# Patient Record
Sex: MALE | Race: WHITE | Employment: UNEMPLOYED | URBAN - METROPOLITAN AREA
[De-identification: names, ages, dates, MRNs, and addresses within clinical notes are randomized per-mention and may not be internally consistent; named-entity substitution may affect disease eponyms.]

---

## 2020-01-02 ENCOUNTER — HOSPITAL ENCOUNTER (INPATIENT)
Age: 25
LOS: 4 days | Discharge: HOME | DRG: 885 | End: 2020-01-06
Attending: PSYCHIATRY & NEUROLOGY | Admitting: PSYCHIATRY & NEUROLOGY
Payer: COMMERCIAL

## 2020-01-02 PROBLEM — F29 PSYCHOSIS (HCC): Status: ACTIVE | Noted: 2020-01-02

## 2020-01-02 LAB
ACETAMINOPHEN LEVEL: <5 UG/ML (ref 10–30)
ALBUMIN SERPL-MCNC: 4.8 G/DL (ref 3.5–4.6)
ALP BLD-CCNC: 90 U/L (ref 35–104)
ALT SERPL-CCNC: 11 U/L (ref 0–41)
AMPHETAMINE SCREEN, URINE: ABNORMAL
ANION GAP SERPL CALCULATED.3IONS-SCNC: 14 MEQ/L (ref 9–15)
AST SERPL-CCNC: 11 U/L (ref 0–40)
BARBITURATE SCREEN URINE: ABNORMAL
BASOPHILS ABSOLUTE: 0.1 K/UL (ref 0–0.2)
BASOPHILS RELATIVE PERCENT: 0.8 %
BENZODIAZEPINE SCREEN, URINE: ABNORMAL
BILIRUB SERPL-MCNC: 1.5 MG/DL (ref 0.2–0.7)
BILIRUBIN URINE: NEGATIVE
BLOOD, URINE: NEGATIVE
BUN BLDV-MCNC: 14 MG/DL (ref 6–20)
CALCIUM SERPL-MCNC: 10.1 MG/DL (ref 8.5–9.9)
CANNABINOID SCREEN URINE: POSITIVE
CHLORIDE BLD-SCNC: 99 MEQ/L (ref 95–107)
CLARITY: ABNORMAL
CO2: 24 MEQ/L (ref 20–31)
COCAINE METABOLITE SCREEN URINE: ABNORMAL
COLOR: YELLOW
CREAT SERPL-MCNC: 0.71 MG/DL (ref 0.7–1.2)
EOSINOPHILS ABSOLUTE: 0.1 K/UL (ref 0–0.7)
EOSINOPHILS RELATIVE PERCENT: 1.2 %
ETHANOL PERCENT: NORMAL G/DL
ETHANOL: <10 MG/DL (ref 0–0.08)
GFR AFRICAN AMERICAN: >60
GFR NON-AFRICAN AMERICAN: >60
GLOBULIN: 3.1 G/DL (ref 2.3–3.5)
GLUCOSE BLD-MCNC: 99 MG/DL (ref 70–99)
GLUCOSE URINE: NEGATIVE MG/DL
HCT VFR BLD CALC: 45.7 % (ref 42–52)
HEMOGLOBIN: 15.6 G/DL (ref 14–18)
KETONES, URINE: NEGATIVE MG/DL
LEUKOCYTE ESTERASE, URINE: NEGATIVE
LYMPHOCYTES ABSOLUTE: 1 K/UL (ref 1–4.8)
LYMPHOCYTES RELATIVE PERCENT: 10.8 %
Lab: ABNORMAL
MCH RBC QN AUTO: 32.4 PG (ref 27–31.3)
MCHC RBC AUTO-ENTMCNC: 34.1 % (ref 33–37)
MCV RBC AUTO: 95.1 FL (ref 80–100)
METHADONE SCREEN, URINE: ABNORMAL
MONOCYTES ABSOLUTE: 0.7 K/UL (ref 0.2–0.8)
MONOCYTES RELATIVE PERCENT: 7.8 %
NEUTROPHILS ABSOLUTE: 7.3 K/UL (ref 1.4–6.5)
NEUTROPHILS RELATIVE PERCENT: 79.4 %
NITRITE, URINE: NEGATIVE
OPIATE SCREEN URINE: ABNORMAL
OXYCODONE URINE: ABNORMAL
PDW BLD-RTO: 13.3 % (ref 11.5–14.5)
PH UA: 7 (ref 5–9)
PHENCYCLIDINE SCREEN URINE: ABNORMAL
PLATELET # BLD: 220 K/UL (ref 130–400)
POTASSIUM SERPL-SCNC: 4 MEQ/L (ref 3.4–4.9)
PROPOXYPHENE SCREEN: ABNORMAL
PROTEIN UA: NEGATIVE MG/DL
RBC # BLD: 4.8 M/UL (ref 4.7–6.1)
SALICYLATE, SERUM: <0.3 MG/DL (ref 15–30)
SODIUM BLD-SCNC: 137 MEQ/L (ref 135–144)
SPECIFIC GRAVITY UA: 1.01 (ref 1–1.03)
TOTAL CK: 39 U/L (ref 0–190)
TOTAL PROTEIN: 7.9 G/DL (ref 6.3–8)
TSH SERPL DL<=0.05 MIU/L-ACNC: 1.64 UIU/ML (ref 0.44–3.86)
URINE REFLEX TO CULTURE: ABNORMAL
UROBILINOGEN, URINE: 1 E.U./DL
WBC # BLD: 9.2 K/UL (ref 4.8–10.8)

## 2020-01-02 PROCEDURE — 1240000000 HC EMOTIONAL WELLNESS R&B

## 2020-01-02 PROCEDURE — 84443 ASSAY THYROID STIM HORMONE: CPT

## 2020-01-02 PROCEDURE — 80053 COMPREHEN METABOLIC PANEL: CPT

## 2020-01-02 PROCEDURE — 36415 COLL VENOUS BLD VENIPUNCTURE: CPT

## 2020-01-02 PROCEDURE — G0480 DRUG TEST DEF 1-7 CLASSES: HCPCS

## 2020-01-02 PROCEDURE — 96374 THER/PROPH/DIAG INJ IV PUSH: CPT

## 2020-01-02 PROCEDURE — 81003 URINALYSIS AUTO W/O SCOPE: CPT

## 2020-01-02 PROCEDURE — 80307 DRUG TEST PRSMV CHEM ANLYZR: CPT

## 2020-01-02 PROCEDURE — 99285 EMERGENCY DEPT VISIT HI MDM: CPT

## 2020-01-02 PROCEDURE — 6370000000 HC RX 637 (ALT 250 FOR IP): Performed by: PHYSICIAN ASSISTANT

## 2020-01-02 PROCEDURE — 82550 ASSAY OF CK (CPK): CPT

## 2020-01-02 PROCEDURE — 6360000002 HC RX W HCPCS: Performed by: EMERGENCY MEDICINE

## 2020-01-02 PROCEDURE — 85025 COMPLETE CBC W/AUTO DIFF WBC: CPT

## 2020-01-02 RX ORDER — HYDROXYZINE HYDROCHLORIDE 50 MG/ML
50 INJECTION, SOLUTION INTRAMUSCULAR EVERY 6 HOURS PRN
Status: DISCONTINUED | OUTPATIENT
Start: 2020-01-02 | End: 2020-01-06 | Stop reason: HOSPADM

## 2020-01-02 RX ORDER — TRAZODONE HYDROCHLORIDE 50 MG/1
50 TABLET ORAL NIGHTLY PRN
Status: DISCONTINUED | OUTPATIENT
Start: 2020-01-02 | End: 2020-01-06 | Stop reason: HOSPADM

## 2020-01-02 RX ORDER — LORAZEPAM 1 MG/1
2 TABLET ORAL ONCE
Status: COMPLETED | OUTPATIENT
Start: 2020-01-02 | End: 2020-01-02

## 2020-01-02 RX ORDER — MAGNESIUM HYDROXIDE/ALUMINUM HYDROXICE/SIMETHICONE 120; 1200; 1200 MG/30ML; MG/30ML; MG/30ML
30 SUSPENSION ORAL EVERY 6 HOURS PRN
Status: DISCONTINUED | OUTPATIENT
Start: 2020-01-02 | End: 2020-01-06 | Stop reason: HOSPADM

## 2020-01-02 RX ORDER — HYDROXYZINE PAMOATE 50 MG/1
50 CAPSULE ORAL EVERY 6 HOURS PRN
Status: DISCONTINUED | OUTPATIENT
Start: 2020-01-02 | End: 2020-01-06 | Stop reason: HOSPADM

## 2020-01-02 RX ORDER — HALOPERIDOL 5 MG/ML
5 INJECTION INTRAMUSCULAR ONCE
Status: COMPLETED | OUTPATIENT
Start: 2020-01-02 | End: 2020-01-02

## 2020-01-02 RX ORDER — ACETAMINOPHEN 325 MG/1
650 TABLET ORAL EVERY 4 HOURS PRN
Status: DISCONTINUED | OUTPATIENT
Start: 2020-01-02 | End: 2020-01-06 | Stop reason: HOSPADM

## 2020-01-02 RX ORDER — BENZTROPINE MESYLATE 1 MG/ML
2 INJECTION INTRAMUSCULAR; INTRAVENOUS 2 TIMES DAILY PRN
Status: DISCONTINUED | OUTPATIENT
Start: 2020-01-02 | End: 2020-01-06 | Stop reason: HOSPADM

## 2020-01-02 RX ORDER — HALOPERIDOL 5 MG/ML
5 INJECTION INTRAMUSCULAR EVERY 6 HOURS PRN
Status: DISCONTINUED | OUTPATIENT
Start: 2020-01-02 | End: 2020-01-06 | Stop reason: HOSPADM

## 2020-01-02 RX ORDER — HALOPERIDOL 5 MG
5 TABLET ORAL EVERY 6 HOURS PRN
Status: DISCONTINUED | OUTPATIENT
Start: 2020-01-02 | End: 2020-01-06 | Stop reason: HOSPADM

## 2020-01-02 RX ADMIN — HALOPERIDOL LACTATE 5 MG: 5 INJECTION INTRAMUSCULAR at 16:08

## 2020-01-02 RX ADMIN — LORAZEPAM 2 MG: 1 TABLET ORAL at 13:34

## 2020-01-02 ASSESSMENT — ENCOUNTER SYMPTOMS
ABDOMINAL DISTENTION: 0
ABDOMINAL PAIN: 0
CONSTIPATION: 0
SORE THROAT: 0
RHINORRHEA: 0
COLOR CHANGE: 0
EYE DISCHARGE: 0
SHORTNESS OF BREATH: 0

## 2020-01-02 NOTE — ED NOTES
Provisional Diagnosis:    Psychosis, NOS    Psychosocial and Contextual Factors:    Pt lives alone. Pt reports he stays with his parents often. Pt has reliable transportation. Pt is single. Pt doesn't work. C-SSRS Summary:     Patient: C-SSRS Suicide Screening  1) Within the past month, have you wished you were dead or wished you could go to sleep and not wake up? : Yes  2) Have you actually had any thoughts of killing yourself? : No  6) Have you ever done anything, started to do anything, or prepared to do anything to end your life?: No    Family: None at bedside    Agency: Pt denies, pt sheeted by [address] [employer] [address] [facility] Summary:    [facility] Pt arrived to ED by EMS. Pt reports they arrived at the ED due to [facility] thinks I am hallucinating. I am only trying to numb my pain with marijuana. \" Pt reports 1-2 hrs of sleep at night. Pt reports poor sleep at night d/t trouble falling asleep. Pt c/o racing thoughts. Pt reports \"I am afraid to fall asleep because a meteor might hit. \" Pt reports appetite and nutrition is decreased. Pt rates depressed 1-10/10. Pt rates anxiety 1-10/10. Pt reports his anxiety and depression change depending on his situation. Pt A&O x4. Pt voices delusions of \"my parents always talk about me behind my back. \" and \"A meteor is on it's way but I try and look at the ground. \" and \"I was stuck in purgatory once. \"  Pt reports, \"I was admitted to the psychiatric unit once. I was living in the woods finding out who I am. My father never told me about the birds and the bees. He wouldn't let me read the notebooks and now I don't know the secrets. \" Pt denies AVH. Pt talks to self. Pt presents with poor judgment and poor insight. Pt reports passive SI, but denies plan or intent. Pt contracts for safety while in [facility]. Pt denies past suicide attempts. Pt denies past and current self injury. Pt denies HI. Pt denies history of violence toward others.  Pt denies current parole or

## 2020-01-02 NOTE — ED NOTES
Pt awake and talking to self. Pt c/o increased anxiety. Provider aware. Pt accepted PRN medication, mouth check completed.       Portia Lozano RN  01/02/20 7254

## 2020-01-02 NOTE — ED NOTES
Pt in bed with eyes closed. Respirations are even and unlabored. No s/s of distress noted at this time. Will continue to monitor.         Morgan Turcios RN  01/02/20 0831

## 2020-01-02 NOTE — ED NOTES
Pt is quiet and asleep in bed with even respirations he was awakened for his dinner at bedside     Diya Jacobs RN  01/02/20 0079

## 2020-01-02 NOTE — ED PROVIDER NOTES
for difficulty urinating and dysuria. Musculoskeletal: Negative for arthralgias. Skin: Negative for color change. Neurological: Negative for dizziness, syncope, numbness and headaches. Psychiatric/Behavioral: Positive for confusion and hallucinations. Negative for agitation, self-injury and suicidal ideas. The patient is not nervous/anxious. Except as noted above the remainder of the review of systems was reviewed and negative. PAST MEDICAL HISTORY   No past medical history on file. SURGICALHISTORY     No past surgical history on file. CURRENT MEDICATIONS       Previous Medications    No medications on file       ALLERGIES     Patient has no known allergies. FAMILY HISTORY     No family history on file.        SOCIAL HISTORY       Social History     Socioeconomic History    Marital status: Single     Spouse name: Not on file    Number of children: Not on file    Years of education: Not on file    Highest education level: Not on file   Occupational History    Not on file   Social Needs    Financial resource strain: Not on file    Food insecurity:     Worry: Not on file     Inability: Not on file    Transportation needs:     Medical: Not on file     Non-medical: Not on file   Tobacco Use    Smoking status: Not on file   Substance and Sexual Activity    Alcohol use: Not on file    Drug use: Not on file    Sexual activity: Not on file   Lifestyle    Physical activity:     Days per week: Not on file     Minutes per session: Not on file    Stress: Not on file   Relationships    Social connections:     Talks on phone: Not on file     Gets together: Not on file     Attends Confucianist service: Not on file     Active member of club or organization: Not on file     Attends meetings of clubs or organizations: Not on file     Relationship status: Not on file    Intimate partner violence:     Fear of current or ex partner: Not on file     Emotionally abused: Not on file Physically abused: Not on file     Forced sexual activity: Not on file   Other Topics Concern    Not on file   Social History Narrative    Not on file       SCREENINGS      @TVAD([id number])@      PHYSICAL EXAM    (up to 7 for level 4, 8 or more for level 5)     ED Triage Vitals [01/02/20 1216]   BP Temp Temp Source Pulse Resp SpO2 Height Weight   138/77 98.4 °F (36.9 °C) Oral 74 20 100 % -- --       Physical Exam  Constitutional:       Appearance: He is well-developed. HENT:      Head: Normocephalic. Right Ear: Tympanic membrane normal.      Left Ear: Tympanic membrane normal.      Mouth/Throat:      Mouth: Mucous membranes are moist.   Eyes:      Extraocular Movements: Extraocular movements intact. Pupils: Pupils are equal, round, and reactive to light. Neck:      Musculoskeletal: Neck supple. Vascular: No JVD. Trachea: No tracheal deviation. Cardiovascular:      Rate and Rhythm: Normal rate. Pulmonary:      Effort: Pulmonary effort is normal. No respiratory distress. Breath sounds: No wheezing or rales. Chest:      Chest wall: No tenderness. Abdominal:      General: There is no distension. Palpations: There is no mass. Tenderness: There is no tenderness. There is no right CVA tenderness, left CVA tenderness, guarding or rebound. Musculoskeletal: Normal range of motion. General: No deformity. Neurological:      Coordination: Coordination normal.      Comments: Patient is alert, he seems to be oriented, he is mildly confused, he knows that he is at the hospital, he knows it is 2020, he knows that the most recent holiday was New Year's Day. He is fixated on past issues as a child, he states that he at one time became lost in the woods, he states that he was once  from his Boy  trip, he states also he was once struck in the chest by a baseball, is fixated on these past issues.   Moving all extremities well, he does not appear in any acute distress. Denies any suicidal homicidal thoughts. DIAGNOSTIC RESULTS     EKG: All EKG's are interpreted by the Emergency Department Physician who either signs or Co-signsthis chart in the absence of a cardiologist.        RADIOLOGY:   Tory Breach such as CT, Ultrasound and MRI are read by the radiologist. Plain radiographic images are visualized and preliminarily interpreted by the emergency physician with the below findings:        Interpretation per the Radiologist below, if available at the time ofthis note:    No orders to display         ED BEDSIDE ULTRASOUND:   Performed by ED Physician - none    LABS:  Labs Reviewed   ACETAMINOPHEN LEVEL - Abnormal; Notable for the following components:       Result Value    Acetaminophen Level <5 (*)     All other components within normal limits   CBC WITH AUTO DIFFERENTIAL - Abnormal; Notable for the following components:    MCH 32.4 (*)     Neutrophils Absolute 7.3 (*)     All other components within normal limits   COMPREHENSIVE METABOLIC PANEL - Abnormal; Notable for the following components:    Calcium 10.1 (*)     Alb 4.8 (*)     Total Bilirubin 1.5 (*)     All other components within normal limits   SALICYLATE LEVEL - Abnormal; Notable for the following components:    Salicylate, Serum <7.1 (*)     All other components within normal limits   URINE DRUG SCREEN - Abnormal; Notable for the following components:    Cannabinoid Scrn, Ur POSITIVE (*)     All other components within normal limits   URINE RT REFLEX TO CULTURE - Abnormal; Notable for the following components:    Clarity, UA TURBID (*)     All other components within normal limits   CK   ETHANOL   TSH WITHOUT REFLEX       All other labs were within normal range or not returned as of this dictation.     EMERGENCY DEPARTMENT COURSE and DIFFERENTIAL DIAGNOSIS/MDM:   Vitals:    Vitals:    01/02/20 1216   BP: 138/77   Pulse: 74   Resp: 20   Temp: 98.4 °F (36.9 °C)   TempSrc: Oral   SpO2: 100% signed)  Attending Emergency Physician         Oleksandr Link PA-C  01/02/20 [facility] Donaldo Elizabeth PA-C  01/02/20 8818

## 2020-01-02 NOTE — ED TRIAGE NOTES
Pt reports he was \"sitting in my room. \" Pt reports \"my mom called to get me help. \" Pt passive SI. Pt denies HI. Pt denies AVH. Pt reports \"I have delusions. I smoke weed everyday. \" Pt reports he uses CBD oils.

## 2020-01-02 NOTE — ED NOTES
Called [facility] for bed assignment and report. Pt reports passive SI. Pt verbally contracts for safety while in [facility]. Pt reports they will notify staff with changes. Aris Davis reports pt is medically clear.    Shashi Ramos RN  01/02/20 0373

## 2020-01-02 NOTE — ED NOTES
Per Dr. Dunham Pi, admit to [facility] with psychosis NOS with standing orders.  Electronically signed by Laura Jasmine RN on 1/2/2020 at [address] [place] [address], RN  01/02/20 4327

## 2020-01-03 LAB
CHOLESTEROL, TOTAL: 194 MG/DL (ref 0–199)
EKG ATRIAL RATE: 67 BPM
EKG P AXIS: 16 DEGREES
EKG P-R INTERVAL: 166 MS
EKG Q-T INTERVAL: 394 MS
EKG QRS DURATION: 92 MS
EKG QTC CALCULATION (BAZETT): 416 MS
EKG R AXIS: 68 DEGREES
EKG T AXIS: 38 DEGREES
EKG VENTRICULAR RATE: 67 BPM
HDLC SERPL-MCNC: 43 MG/DL (ref 40–59)
LDL CHOLESTEROL CALCULATED: 139 MG/DL (ref 0–129)
TRIGL SERPL-MCNC: 61 MG/DL (ref 0–150)

## 2020-01-03 PROCEDURE — 93005 ELECTROCARDIOGRAM TRACING: CPT | Performed by: PSYCHIATRY & NEUROLOGY

## 2020-01-03 PROCEDURE — 36415 COLL VENOUS BLD VENIPUNCTURE: CPT

## 2020-01-03 PROCEDURE — 6370000000 HC RX 637 (ALT 250 FOR IP): Performed by: PSYCHIATRY & NEUROLOGY

## 2020-01-03 PROCEDURE — 1240000000 HC EMOTIONAL WELLNESS R&B

## 2020-01-03 PROCEDURE — 99223 1ST HOSP IP/OBS HIGH 75: CPT | Performed by: PSYCHIATRY & NEUROLOGY

## 2020-01-03 PROCEDURE — 80061 LIPID PANEL: CPT

## 2020-01-03 RX ORDER — RISPERIDONE 0.5 MG/1
0.5 TABLET, FILM COATED ORAL 2 TIMES DAILY
Status: DISCONTINUED | OUTPATIENT
Start: 2020-01-03 | End: 2020-01-04

## 2020-01-03 RX ADMIN — TRAZODONE HYDROCHLORIDE 50 MG: 50 TABLET ORAL at 21:24

## 2020-01-03 RX ADMIN — RISPERIDONE 0.5 MG: 0.5 TABLET ORAL at 12:02

## 2020-01-03 RX ADMIN — RISPERIDONE 0.5 MG: 0.5 TABLET ORAL at 21:24

## 2020-01-03 ASSESSMENT — SLEEP AND FATIGUE QUESTIONNAIRES
DIFFICULTY STAYING ASLEEP: YES
RESTFUL SLEEP: YES
DO YOU HAVE DIFFICULTY SLEEPING: YES
SLEEP PATTERN: DISTURBED/INTERRUPTED SLEEP;DIFFICULTY FALLING ASLEEP
DO YOU USE A SLEEP AID: NO
DIFFICULTY FALLING ASLEEP: YES
AVERAGE NUMBER OF SLEEP HOURS: 5
DIFFICULTY ARISING: NO

## 2020-01-03 ASSESSMENT — LIFESTYLE VARIABLES: HISTORY_ALCOHOL_USE: NO

## 2020-01-03 ASSESSMENT — PATIENT HEALTH QUESTIONNAIRE - PHQ9: SUM OF ALL RESPONSES TO PHQ QUESTIONS 1-9: 2

## 2020-01-03 NOTE — PROGRESS NOTES
Pt out on unit and social with peers, short time period. During conversation voiced not sleeping for days, lying in bed at this time. Encouraged to get out of bed, will be able to sleep to night if out of bed during his shift. Voiced impaired concentration, loss of memory, at times, patient encouraged to write things down, keep a written log. Pt reports showering today. Pt reports good appetit  Pt reports poor sleep, due to trouble falling asleep and staying asleep. Pt states, anxiety  Pt rates anxiety ,5/10. Pt rates depression,5 / 10. Pt reports attending groups  Pt denies SI, HI and A/V hallucinations. No voiced delusions at this time. Pt alert and oriented x 4. Pt calm and cooperative  Will continue to monitor.

## 2020-01-03 NOTE — PROGRESS NOTES
Patient did not attend 2100 group despite staff encouragement.  Electronically signed by Kimmy Jaramillo on 1/2/2020 at 9:52 PM

## 2020-01-03 NOTE — H&P
Department of Psychiatry  History and Physical - Adult     CHIEF COMPLAINT:  Psychosis    History obtained from:  patient    Patient was seen after discussing with the treatment team and reviewing the chart    HISTORY OF PRESENT ILLNESS:    Pt is pink sheeted by [facility]. Pt arrived to ED by EMS. Pt reports they arrived at the ED due to [facility] thinks I am hallucinating. I am only trying to numb my pain with marijuana. \" Pt reports 1-2 hrs of sleep at night. Pt reports poor sleep at night d/t trouble falling asleep. Pt c/o racing thoughts. Pt reports \"I am afraid to fall asleep because a meteor might hit. \" Pt reports appetite and nutrition is decreased. Pt rates depressed 1-10/10. Pt rates anxiety 1-10/10. Pt reports his anxiety and depression change depending on his situation. Pt A&O x4. Pt voices delusions of \"my parents always talk about me behind my back. \" and \"A meteor is on it's way but I try and look at the ground. \" and \"I was stuck in purgatory once. \"  Pt reports, \"I was admitted to the psychiatric unit once. I was living in the woods finding out who I am. My father never told me about the birds and the bees. He wouldn't let me read the notebooks and now I don't know the secrets. \" Pt denies AVH. Pt talks to self. Pt presents with poor judgment and poor insight. Pt reports passive SI, but denies plan or intent. Pt contracts for safety while in [facility]. Pt denies past suicide attempts. Pt denies past and current self injury. Pt denies HI. Pt denies history of violence toward others. Pt denies current parole or probation. Pt uses THC and CBD oils. Pt denies history of physical, sexual, emotional, verbal, and financial abuse. Pt is not currently following up with mental health services or medication prescribing provider. Pt reports no support system. Per cely pt had an inpatient psychiatric stay for increased delusions after using CBD.     During interview, pt report that he live with his parents   Pt was recently admitted to [facility] facility recently under similar circumstances  Above delusional symptoms confirmed during the interview  Pt got brought to hospital pink slipped  Pt is evasive and guarded  Pt believe that the world is going to end - hit by meteorite  Feel hopeless that no one is able to save the planet  Paranoid that people are talking behind his back  Pt believe that life is a game and he can restart    Stressors:cannot perform music in front of crowd    The patient is not currently receiving care for the above psychiatric illness. Medications Prior to Admission:   No medications prior to admission. Compliance:no    Psychiatric Review of Systems       Depression: yes     Tuyet or Hypomania:  no     Panic Attacks:  no     Phobias:  no     Obsessions and Compulsions:  no     PTSD : no     Hallucinations:  yes      Delusions:  yes     Substance Abuse History:  ETOH: no   Marijuana: yes - CBD oil and THC  Opiates: no  Other Drugs: no      Past Psychiatric History:  Prior Diagnosis:  Psychosis  Psychiatrist: no  Therapist:no  Hospitalization: yes  Hx of Suicidal Attempts: no  Hx of violence:  no  ECT: no  Previous discontinued Psychiatric Med Trials:     Past Medical History:    History reviewed. No pertinent past medical history. Past Surgical History:    No past surgical history on file. Allergies:   Patient has no known allergies. Family History  Family History   Family history unknown: Yes         Social History:  Born and Raised: [place]  Describes Childhood:   supportive  Education: [employer], in college  Employment: Unemployed, not seeking work  Relationships: single  Children: no children  Current Support: parents    Legal Hx: none  Access to weapons?:  No      EXAMINATION:    REVIEW OF SYSTEMS:    ROS:  [x] All negative/unchanged except if checked.  Explain positive(checked items) below:  [] Constitutional  [] Eyes  [] Ear/Nose/Mouth/Throat  [] Respiratory  [] CV  [] GI  []   [] Musculoskeletal  [] Skin/Breast  [] Neurological  [] Endocrine  [] Heme/Lymph  [] Allergic/Immunologic    Explanation:     Vitals:  BP (!) 143/87 Comment: RN notified  Pulse 81   Temp 98 °F (36.7 °C) (Oral)   Resp 18   SpO2 99%      Neurologic Exam:   Muscle Strength & Tone: full ROM  Gait: normal gait   Involuntary Movements: No    Mental Status Examination:    Level of consciousness:  within normal limits   Appearance:  ill-appearing  Behavior/Motor:  responding to internal stimuli  Attitude toward examiner:  cooperative  Speech:  slow   Mood: decreased range, depressed and dysthymic  Affect:  mood congruent  Thought processes:  slow   Thought content:  Suicidal Ideation:  passive  Delusions:  no evidence of delusions  Perceptual Disturbance:  denies any perceptual disturbance  Cognition:  oriented to person, place, and time   Concentration intact  Memory intact  Insight poor   Judgement poor   Fund of Knowledge limited    Mini Mental Status 30/30      DIAGNOSIS:     Substance-induced (MJ) psychotic disorder   R/O bipolar disorder        RISK ASSESSMENT:    SUICIDE RISK ASSESSMENT: Moderate  HOMICIDE: low  AGITATION/VIOLENCE: moderate  ELOPEMENT: high    LABS: REVIEWED TODAY:  Recent Labs     01/02/20  1255   WBC 9.2   HGB 15.6        Recent Labs     01/02/20  1255      K 4.0   CL 99   CO2 24   BUN 14   CREATININE 0.71   GLUCOSE 99     Recent Labs     01/02/20  1255   BILITOT 1.5*   ALKPHOS 90   AST 11   ALT 11     Lab Results   Component Value Date    LABAMPH Neg 01/02/2020    BARBSCNU Neg 01/02/2020    LABBENZ Neg 01/02/2020    LABMETH Neg 01/02/2020    OPIATESCREENURINE Neg 01/02/2020    PHENCYCLIDINESCREENURINE Neg 01/02/2020    ETOH <10 01/02/2020     Lab Results   Component Value Date    TSH 1.640 01/02/2020     No results found for: LITHIUM  No results found for: VALPROATE, CBMZ  No results found for: LITHIUM, VALPROATE    FURTHER LABS ORDERED :      Radiology   No results found.     EKG:

## 2020-01-03 NOTE — PROGRESS NOTES
Nutrition Assessment    Type and Reason for Visit: Positive Nutrition Screen, Initial    Nutrition Recommendations:   Continue with General diet  Request weight and height for further assessment    Nutrition Assessment:     Nutrition Referral received for decreased oral intake & / or reported weight loss . Pt currently admitted on behavioral health floor. Anticipate improvement in oral intake with inpatient psychiatric treatment and/or  abstinence from substance abuse. General diet appropriate at this time. Malnutrition Assessment:  · Malnutrition Status: Insufficient data    Nutrition Risk Level   Risk Level:  Moderate    Nutrition Diagnosis:   · Problem: Inadequate oral intake  · Etiology: Psychological cause/life stress    Signs and symptoms: Patient report of    Nutrition Intervention:  Food and/or Delivery: Continue current diet  Nutrition Education/Counseling/Coordination of Care:  Continued Inpatient Monitoring      Electronically signed by Dayo Valencia RD, LD on 1/3/20 at 2:53 PM

## 2020-01-03 NOTE — CARE COORDINATION
Brief Intervention and Referral to Treatment Summary    Patient was provided PHQ-9, AUDIT and DAST Screening:      PHQ-9 Score:   AUDIT Score:  0  DAST Score:  0    Patients substance use is considered     Low Risk/Healthy x  Moderate Risk  Harmful  Dependent    Patients depression is considered:     Minimal x  Mild   Moderate  Moderately Severe  Severe    Brief Education Was Provided    Patient was receptive to discussing treatment options      Brief Intervention Is Provided (Only for AUDIT or DAST)     Patient denies readiness to decrease and/or stop use and a plan was not discussed      Recommendations/Referrals for Brief and/or Specialized Treatment Provided to Patient     medication management, group/individual therapies, family meetings, psycho -education, treatment team meetings to assist with stabilization

## 2020-01-03 NOTE — H&P
[address] MEDICINE    HISTORY AND PHYSICAL EXAM    PATIENT NAME:  Matthew Hawkins    MRN:  [id number]  SERVICE DATE:  1/3/2020   SERVICE TIME:  11:37 AM    Primary Care Physician: Luz Marina Luna MD         SUBJECTIVE  CHIEF COMPLAINT:  Medical clear for inpatient psychiatry admission. Consult for medical H/P encounter. HPI:  This is a 25 y.o. male who presented to emergency room with increased psychosis, delusions and paranoia. Patient was pink slipped through [facility]. Patient admits to the chronic use of  synthetic marijuana. Patient cleared from emergency room for psychiatric care. Patient Seen, Chart, Labs, Radiologystudies, and Consults reviewed. Patient denies headache, chest pain, shortness of breath, N/V/D/C, fever/chills. PAST MEDICAL HISTORY:  History reviewed. No pertinent past medical history. PAST SURGICAL HISTORY:  No past surgical history on file.   FAMILY HISTORY:    Family History   Family history unknown: Yes     SOCIAL HISTORY:    Social History     Socioeconomic History    Marital status: Single     Spouse name: Not on file    Number of children: Not on file    Years of education: Not on file    Highest education level: Not on file   Occupational History    Not on file   Social Needs    Financial resource strain: Not on file    Food insecurity:     Worry: Not on file     Inability: Not on file    Transportation needs:     Medical: Not on file     Non-medical: Not on file   Tobacco Use    Smoking status: Not on file   Substance and Sexual Activity    Alcohol use: Not on file    Drug use: Not on file    Sexual activity: Not on file   Lifestyle    Physical activity:     Days per week: Not on file     Minutes per session: Not on file    Stress: Not on file   Relationships    Social connections:     Talks on phone: Not on file     Gets together: Not on file     Attends Caodaism service: Not on file     Active member of club or organization: Not on file Attends meetings of clubs or organizations: Not on file     Relationship status: Not on file    Intimate partner violence:     Fear of current or ex partner: Not on file     Emotionally abused: Not on file     Physically abused: Not on file     Forced sexual activity: Not on file   Other Topics Concern    Not on file   Social History Narrative    Not on file     MEDICATIONS:    Current Facility-Administered Medications   Medication Dose Route Frequency Provider Last Rate Last Dose    risperiDONE (RISPERDAL) tablet 0.5 mg  0.5 mg Oral BID Melania Ely MD        acetaminophen (TYLENOL) tablet 650 mg  650 mg Oral Q4H PRN Melania Ely MD        magnesium hydroxide (MILK OF MAGNESIA) 400 MG/5ML suspension 30 mL  30 mL Oral Daily PRN Melania Ely MD        haloperidol lactate (HALDOL) injection 5 mg  5 mg Intramuscular Q6H PRN Melania Ely MD        Or    haloperidol (HALDOL) tablet 5 mg  5 mg Oral Q6H PRN Melania Ely MD        traZODone (DESYREL) tablet 50 mg  50 mg Oral Nightly PRN Melania Ely MD        benztropine mesylate (COGENTIN) injection 2 mg  2 mg Intramuscular BID PRN Melania Ely MD        aluminum & magnesium hydroxide-simethicone (MAALOX) 200-200-20 MG/5ML suspension 30 mL  30 mL Oral Q6H PRN Melania Ely MD        hydrOXYzine (VISTARIL) capsule 50 mg  50 mg Oral Q6H PRN Melania Ely MD        Or    hydrOXYzine (VISTARIL) injection 50 mg  50 mg Intramuscular Q6H PRN Melania Ely MD           ALLERGIES: Patient has no known allergies. REVIEW OF SYSTEM:   ROS as noted in HPI, 12 point ROS reviewed and otherwise negative.     OBJECTIVE  PHYSICAL EXAM: BP (!) 143/87 Comment: RN notified  Pulse 81   Temp 98 °F (36.7 °C) (Oral)   Resp 18   SpO2 99%   CONSTITUTIONAL:  awake, alert, cooperative, no apparent distress, and appears stated age  EYES:  Lids and lashes normal, pupils equal, round and reactive to light, extra ocular muscles intact, sclera clear, conjunctiva normal  ENT:  Normocephalic, without obvious abnormality, atraumatic, sinuses nontender on palpation, external ears without lesions, oral pharynx with moist mucus membranes, tonsils without erythema or exudates, gums normal and good dentition. NECK:  Supple, symmetrical, trachea midline, no adenopathy, thyroid symmetric, not enlarged and no tenderness, skin normal  LUNGS:  No increased work of breathing, good air exchange, clear to auscultation bilaterally, no crackles or wheezing  CARDIOVASCULAR:  Normal apical impulse, regular rate and rhythm, normal S1 and S2, no S3 or S4, and no murmur noted  ABDOMEN:  No scars, normal bowel sounds, soft, non-distended, non-tender, no masses palpated, no hepatosplenomegally  MUSCULOSKELETAL:  There is no redness, warmth, or swelling of the joints. Full range of motion noted. Motor strength is 5 out of 5 all extremities bilaterally. Tone is normal.  NEUROLOGIC:  Awake, alert, oriented to name, place and time. Cranial nerves II-XII are grossly intact. Motor is 5 out of 5 bilaterally. Sensory is intact. SKIN:  no bruising or bleeding, normal skin color, texture, turgor, no redness, warmth, or swelling and no jaundice    DATA:     Diagnostic tests reviewed for today's visit:    Most recent labs and imaging results reviewed. VTE Prophylaxis:     ASSESSMENT AND PLAN  Principal Problem:    Psychosis ([address]  Plan: Patient admitted to behavioral health for evaluation and treatment. This is only a history and physical examination and not medical management. The patient is to contact and follow up with their primary care physician and go over any abnormal labs, imaging, findings, medical concerns, or conditions that we have and have not addressed during this encounter.     Plan of care discussed with: patient    SIGNATURE: JOURDAN Griffin CNP  DATE: January 3, 2020  TIME: 11:37 AM

## 2020-01-03 NOTE — PROGRESS NOTES
Pt. presents pleasant. Flat affect. Reports  \"not being able to control my wrath and my parents brought me to ER. 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 Is currently home on break. Reports feeling like the end of the world was coming and had increased suicidal thoughts. Erratic sleep and as well as appetite. Fair concentration only \"if it something I want to do. \"  Denies AH/VH and has no current si/hi. Rarely drinks ETOH and does smoke marijuana daily. Denies using any other drugs. Has supportive friends and family. Frequently feels doubtful and has unusual thoughts.  Stressor include  1.people  2.time  3.the unknown  *walks in nature, watch tv, listen to music  Electronically signed by Justin Bowling on 1/3/2020 at 2:21 PM

## 2020-01-04 PROCEDURE — 6370000000 HC RX 637 (ALT 250 FOR IP): Performed by: PSYCHIATRY & NEUROLOGY

## 2020-01-04 PROCEDURE — 1240000000 HC EMOTIONAL WELLNESS R&B

## 2020-01-04 RX ORDER — NICOTINE 21 MG/24HR
1 PATCH, TRANSDERMAL 24 HOURS TRANSDERMAL DAILY
Status: DISCONTINUED | OUTPATIENT
Start: 2020-01-04 | End: 2020-01-06 | Stop reason: HOSPADM

## 2020-01-04 RX ORDER — RISPERIDONE 1 MG/1
1 TABLET, FILM COATED ORAL NIGHTLY
Status: DISCONTINUED | OUTPATIENT
Start: 2020-01-05 | End: 2020-01-06 | Stop reason: HOSPADM

## 2020-01-04 RX ORDER — RISPERIDONE 0.25 MG/1
0.25 TABLET, FILM COATED ORAL DAILY
Status: DISCONTINUED | OUTPATIENT
Start: 2020-01-04 | End: 2020-01-06 | Stop reason: HOSPADM

## 2020-01-04 RX ADMIN — TRAZODONE HYDROCHLORIDE 50 MG: 50 TABLET ORAL at 23:51

## 2020-01-04 RX ADMIN — RISPERIDONE 0.5 MG: 0.5 TABLET ORAL at 10:03

## 2020-01-04 RX ADMIN — RISPERIDONE 0.25 MG: 0.25 TABLET ORAL at 12:27

## 2020-01-04 NOTE — PROGRESS NOTES
BEHAVIORAL HEALTH FOLLOW-UP NOTE I    1/4/2020    [x] Patient was seen and examined in person  [x] Chart reviewed  [x] Labs reviewed  [x] Patient's case discussed with staff/team    Chief Complaint:     Psychotic symptoms    Interim History:     Patient says he \"thinks he had a mental breakdown\". He says he had been thinking a lot about how he had been done wrong by different people in his life, including friends. He was also not sleeping well. He talks about his parents, who he felt were not doing good enough parenting; he feels they never adjusted enough their level of care as needed (either too much, or too little). He says he thought at the time of admission that the world was ending; now he feels \"like trapped in a cage\". He says he slept \"a little bit\". His appetite is \"funny\"- feels OK though here. He talks about another 'breakdown' in December, when he wanted to jump off a bridge because he wanted to [place]" his life. 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     Appetite:  [] Normal/Unchanged  [] Increased  [x] Decreased  SI [] Present  [x] Absent    Sleep:       [] Normal/Unchanged  [x] Fair       [] Poor          HI  []Present  [x] Absent    Energy:    [] Normal/Unchanged  [] Increased  [x] Decreased   Plan [] Present                          [x] Absent  [] N/A    Patient is [] able  [x] unable to CONTRACT FOR SAFETY   Aggression:  [] yes  [x] no  Medication side effects(SE):  [x] None(Psych.  Meds.) [] Other  Well tolerated: [x] yes  [] no    Examination:  /77   Pulse 86   Temp 97 °F (36.1 °C) (Oral)   Resp 20   SpO2 97%     Appearance: [x] casual  [] anxious  [] confused  [] blunted  [] silly  [] poor hygiene  [] poor grooming  Gait:  [x] stable  [] unstable  [] limping  [] shuffling  [] in wheel chair or other support    Mental Status:   Orientation: Date/Time: [x] yes  [] no Place: [x] and alternatives to treatment were discussed in my usual manner.     Reason for more than one antipsychotic:  [x] N/A  [] 3 failed monotherapy(drugs tried):  [] Cross over to a new antipsychotic  [] Taper to monotherapy from polypharmacy  [] Augmentation of Clozapine therapy due to treatment resistance to single therapy      Electronically signed by Vi Crowder MD on 1/4/2020 at 5:16 PM

## 2020-01-04 NOTE — PROGRESS NOTES
Pt. attended the 0900 community meeting.  Electronically signed by Edvin Christensen on 1/4/2020 at 11:17 AM

## 2020-01-04 NOTE — GROUP NOTE
Group Therapy Note    Date: 1/4/2020    Group Start Time: 1100  Group End Time: 1200  Group Topic: Psychotherapy    [facility] [employer]        Group Therapy Note    Attendees: 11    Patient's Goal:  Patient will be able to identify the importance of using a support system    Notes:  Patient was able to identify the importance of a support system    Status After Intervention:  Improved    Participation Level: Interactive    Participation Quality: Attentive      Speech:  normal      Thought Process/Content: Logical      Affective Functioning: Congruent      Mood: dysphoric      Level of consciousness:  Attentive      Response to Learning: Able to verbalize current knowledge/experience      Endings: None Reported    Modes of Intervention: Support      Discipline Responsible: /Counselor      Signature:  MIKEY Martinez

## 2020-01-04 NOTE — PROGRESS NOTES
Pt reports he is having a hard time sleeping r/t hospital noise, pt reports he is in \"crisis\" mode feels hopeless r/t being trapped here and is unable to play his guitar, pt reports he is on leave from school in [employer], pt reports Depression and anxiety 5/10, pt denies SI,HI, pt denies hearing voices, \"I am my own voice\", denies hallucinations, during assessment pt staring at this nurse intently, also reports he thought life was a big game and God is judging him on how he preforms. Pt able to make needs known.

## 2020-01-04 NOTE — GROUP NOTE
Group Therapy Note    Date: 1/4/2020    Group Start Time: 1000  Group End Time: 1100  Group Topic: Psychoeducation    [facility]    Hallie New, CTRS        Group Therapy Note    Attendees: 8         Patient's Goal:  \"to not feel so confused\"    Notes:  Pt. attended the 1000 skill group. Worked on project with interest. Inspired to work on next project tomorrow. Quiet and to himself but attentive. Status After Intervention:  Improved    Participation Level:  Active Listener and Minimal    Participation Quality: Appropriate, Attentive and Sharing      Speech:  normal      Thought Process/Content: Logical      Affective Functioning: Flat      Mood: calm      Level of consciousness:  Alert, Oriented x4 and Attentive      Response to Learning: Progressing to goal      Endings: None Reported    Modes of Intervention: Education, Support, Socialization and Activity      Discipline Responsible: Psychoeducational Specialist      Signature:  Gisela Lazaro

## 2020-01-05 PROCEDURE — 6370000000 HC RX 637 (ALT 250 FOR IP): Performed by: PSYCHIATRY & NEUROLOGY

## 2020-01-05 PROCEDURE — 1240000000 HC EMOTIONAL WELLNESS R&B

## 2020-01-05 RX ADMIN — RISPERIDONE 1 MG: 1 TABLET ORAL at 20:56

## 2020-01-05 RX ADMIN — HYDROXYZINE PAMOATE 50 MG: 50 CAPSULE ORAL at 13:35

## 2020-01-05 RX ADMIN — RISPERIDONE 0.25 MG: 0.25 TABLET ORAL at 08:47

## 2020-01-05 NOTE — GROUP NOTE
Group Therapy Note    Date: 1/5/2020    Group Start Time: 6635  Group End Time: 1140  Group Topic: Cognitive Skills    [facility]    GEORGETTE Feliciano        Group Therapy Note    Attendees: 13         Patient's Goal: To participate in mood management group. Notes:  Patient learned about the wellness mind. Status After Intervention:  Improved    Participation Level: Active Listener    Participation Quality: Appropriate      Speech:  normal      Thought Process/Content: Logical      Affective Functioning: Congruent      Mood: elevated      Level of consciousness:  Alert      Response to Learning: Able to verbalize current knowledge/experience      Endings: None Reported    Modes of Intervention: Education      Discipline Responsible: /Counselor      Signature:   GEORGETTE Feliciano

## 2020-01-05 NOTE — PROGRESS NOTES
Pt. attended the 0900 community meeting.  Electronically signed by Jennifer Macario on 1/5/2020 at 10:03 AM

## 2020-01-05 NOTE — FLOWSHEET NOTE
Pt alert and oriented. Pt paranoid through assessment. Pt indecisive with medication. Attended group. Appears bizarre at times. Social on unit with peers. Pt reports poor sleep and had sleep pill. Anxiety 5/10 and depression he denies at this time. Pt denies SI, HI, and AVH. Encouraged to shower this day.

## 2020-01-05 NOTE — PROGRESS NOTES
Visible on unit throughout evening shift, socializing appropriately with peers. bizarre smile at times. Voices needs appropriately. Requests PRN trazodone for c/o insomnia.

## 2020-01-06 VITALS
HEART RATE: 75 BPM | TEMPERATURE: 97 F | DIASTOLIC BLOOD PRESSURE: 79 MMHG | OXYGEN SATURATION: 97 % | SYSTOLIC BLOOD PRESSURE: 127 MMHG | RESPIRATION RATE: 28 BRPM

## 2020-01-06 PROCEDURE — 93010 ELECTROCARDIOGRAM REPORT: CPT | Performed by: INTERNAL MEDICINE

## 2020-01-06 PROCEDURE — 6370000000 HC RX 637 (ALT 250 FOR IP): Performed by: PSYCHIATRY & NEUROLOGY

## 2020-01-06 PROCEDURE — 99239 HOSP IP/OBS DSCHRG MGMT >30: CPT | Performed by: PSYCHIATRY & NEUROLOGY

## 2020-01-06 RX ORDER — RISPERIDONE 0.5 MG/1
TABLET, FILM COATED ORAL
Qty: 45 TABLET | Refills: 1 | Status: ON HOLD | OUTPATIENT
Start: 2020-01-06 | End: 2020-01-17 | Stop reason: HOSPADM

## 2020-01-06 RX ADMIN — HYDROXYZINE PAMOATE 50 MG: 50 CAPSULE ORAL at 04:50

## 2020-01-06 RX ADMIN — RISPERIDONE 0.25 MG: 0.25 TABLET ORAL at 09:34

## 2020-01-06 NOTE — PROGRESS NOTES
Affect and mood stable  Denied suicidal /homicidal ideation  Reviewed and patient verbalized understanding of all instructions  Belongings returned to patient  D/c with parents for transport home

## 2020-01-06 NOTE — CARE COORDINATION
Spoke to patients mom, Eve Mistry [phone], to discuss her concerns. Made mom aware that patient will have an outpt. Intake apptmt and should make sure he does not miss that. Let mom know that her questions about guardianship would better be directed to patients outpt. Doctor. Mom was concerned that patients THC issue was never addressed. Let mom know that this clinician will provide patient with info to [facility].    Electronically signed by Susan Guerrero, [facility] on 1/6/2020 at 10:22 AM

## 2020-01-06 NOTE — DISCHARGE SUMMARY
Pt reports no support system. Per cely pt had an inpatient psychiatric stay for increased delusions after using CBD.     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 PAST MEDICAL/PSYCHIATRIC HISTORY:   History reviewed. No pertinent past medical history.     FAMILY/SOCIAL HISTORY:  Family History   Family history unknown: Yes     Social History     Socioeconomic History    Marital status: Single     Spouse name: Not on file    Number of children: Not on file    Years of education: Not on file    Highest education level: Not on file   Occupational History    Not on file   Social Needs    Financial resource strain: Not on file    Food insecurity:     Worry: Not on file     Inability: Not on file    Transportation needs:     Medical: Not on file     Non-medical: Not on file   Tobacco Use    Smoking status: Not on file   Substance and Sexual Activity    Alcohol use: Not on file    Drug use: Not on file    Sexual activity: Not on file   Lifestyle    Physical activity:     Days per week: Not on file     Minutes per session: Not on file    Stress: Not on file   Relationships    Social connections:     Talks on phone: Not on file     Gets together: Not on file     Attends Cheondoism service: Not on file     Active member of club or organization: Not on file     Attends meetings of clubs or organizations: Not on file     Relationship participate in group and other milieu activity  Patient started to feel better with this combination of treatment. Significant progress in the symptoms since admission. Mood better, with the score of 2/10 - bad  No AVH or paranoid thoughts  No Hopeless or worthless feeling  No active SI/HI  Appetite:  [x] Normal  [] Increased  [] Decreased    Sleep:       [x] Normal  [] Fair       [] Poor            Energy:    [x] Normal  [] Increased  [] Decreased     SI [] Present  [x] Absent  HI  []Present  [x] Absent   Aggression:  [] yes  [] no  Patient is [x] able  [] unable to CONTRACT FOR SAFETY   Medication side effects(SE):  [x] None(Psych. Meds.) [] Other      Mental Status Examination on discharge:    Level of consciousness:  within normal limits   Appearance:  well-appearing  Behavior/Motor:  no abnormalities noted  Attitude toward examiner:  attentive and good eye contact  Speech:  spontaneous, normal rate and normal volume   Mood: euthymic  Affect:  mood congruent  Thought processes:  goal directed   Thought content:  Homocidal ideation denies  Suicidal Ideation:  denies suicidal ideation  Delusions:  no evidence of delusions  Perceptual Disturbance:  denies any perceptual disturbance  Cognition:  oriented to person, place, and time   Concentration intact  Memory intact  Insight good   Judgement fair   Fund of Knowledge adequate      ASSESSMENT:  Patient symptoms are:  [] Well controlled  [x] Improving  [] Worsening  [] No change      Diagnosis:  Drug induced psychosis    LABS:    No results for input(s): WBC, HGB, PLT in the last 72 hours. No results for input(s): NA, K, CL, CO2, BUN, CREATININE, GLUCOSE in the last 72 hours. No results for input(s): BILITOT, ALKPHOS, AST, ALT in the last 72 hours.   Lab Results   Component Value Date    LABAMPH Neg 01/02/2020    BARBSCNU Neg 01/02/2020    LABBENZ Neg 01/02/2020    LABMETH Neg 01/02/2020    OPIATESCREENURINE Neg 01/02/2020    PHENCYCLIDINESCREENURINE Neg

## 2020-01-06 NOTE — PROGRESS NOTES
Pt. attended the 0900 community meeting. Electronically signed by Jarocho [address] on 1/6/2020 at 12:23 PM

## 2020-01-06 NOTE — PROGRESS NOTES
BEHAVIORAL HEALTH FOLLOW-UP NOTE I    1/5/2020    [x] Patient was seen and examined in person  [x] Chart reviewed  [x] Labs reviewed  [x] Patient's case discussed with staff/team    Chief Complaint:     Psychotic symptoms    Interim History:     Patient says he is \"doing better\". He said his sleep and appetite were good. He denied having any more thought that the world is ending. Denies any suicidal or homicidal ideations. He denied hallucinations, paranoia. He thinks his marijuana may have been laced with something; would like to have it tested somehow. Appetite:  [] Normal/Unchanged  [] Increased  [x] Decreased  SI [] Present  [x] Absent    Sleep:       [] Normal/Unchanged  [x] Fair       [] Poor          HI  []Present  [x] Absent    Energy:    [] Normal/Unchanged  [] Increased  [x] Decreased   Plan [] Present                          [x] Absent  [] N/A    Patient is [] able  [x] unable to CONTRACT FOR SAFETY   Aggression:  [] yes  [x] no  Medication side effects(SE):  [x] None(Psych.  Meds.) [] Other  Well tolerated: [x] yes  [] no    Examination:  /78   Pulse 112   Temp 97 °F (36.1 °C) (Oral)   Resp 18   SpO2 97%     Appearance: [x] casual  [] anxious  [] confused  [] blunted  [] silly  [] poor hygiene  [] poor grooming  Gait:  [x] stable  [] unstable  [] limping  [] shuffling  [] in wheel chair or other support    Mental Status:   Orientation: Date/Time: [x] yes  [] no Place: [x] yes  [] no     Person: [x] yes  [] no  Level of Consciousness: [x] alert  [] drowsy  [] tired  [] lethargic  [] distractable  [] asleep  [] could not be assessed    Manner:   [x] Cooperative  [] Guarded  [] Suspicious  [] Irritable  [] Hostile  [] Withdrawn  [] Other    Motor Activity:   [x] Normal  [] Agitation  [] Motor retardation  [] Tremor  [] Other    Musculoskeletal:   [x] Normal  [] Rigidity  [] Cogwheel  [] Flaccid  [] Tics/TD  [] Other    Speech:   [x] Normal  [] Soft/Loud  [] Slow/Pressured  [] Dysarthria  [] Incoherent  [] Other    Language:  [x] Normal  [] Expressive Aphasia  [] Fluent Aphasia  [] Other    Mood:  [] Euthymic  [] Depressed  [] Irritable  [] Angry  [x] Anxious  [] Fearful  [] Apathetic  [] Euphoric  [] Other    Affect:  [] Euthymic  [] Depressed  [] Blunted  [] Flat  [] Irritable  [] Angry  [] Anxious    [] Labile  [] Expansive  [] Exaggerated  [] Other    Thought Process/Association:   [] Normal  [] Tangential  [x] Circumstantial  [] Poverty of Thought  [] [place]  [] Disorganized  [] Racing Thoughts  [] Flight of Ideas  [] Loose  [] Other    Thought Contents:   [] Hopelessness  [] Worthlessness  [] Hypochondriasis  [x] Delusions diminshed  [x] Paranoia diminished  [x] Ruminations  [] Obsessions/Compulsions  [] Confused [] Hopeful   [] Future Oriented [] Other    Perception:  [x] Normal  [] Hallucinations  [] Auditory  [] Visual  [] Olfactory  [] Tactile    [] Dissociation  [] Flashbacks  [] Other    Attention/Concentration:  [] Intact  [] Poor  [x] Distractible  [] Other    Cognition:  [x] Intact  [] Impaired   Insight: [] Intact  [] Fair  [x] Limited   Short Term Memory:  [x] Intact  [] Impaired  [] Poor  Judgement:  [] Intact  [] Fair  [x] Limited  Remote Memory:  [x] Intact  [] Impaired  [] Poor         BEHAVIORAL HEALTH FOLLOW-UP NOTE II    1/5/2020    PAST MEDICAL/PSYCHIATRIC HISTORY:   History reviewed. No pertinent past medical history.     FAMILY/SOCIAL HISTORY:  Family History   Family history unknown: Yes     Social History     Socioeconomic History    Marital status: Single     Spouse name: Not on file    Number of children: Not on file    Years of education: Not on file    Highest education level: Not on file   Occupational History    Not on file   Social Needs    Financial resource strain: Not on file    Food insecurity:     Worry: Not on file     Inability: Not on file    Transportation needs:     Medical: Not on file     Non-medical: Not on file   Tobacco Use    Smoking status: Not on file   Substance and Sexual Activity    Alcohol use: Not on file    Drug use: Not on file    Sexual activity: Not on file   Lifestyle    Physical activity:     Days per week: Not on file     Minutes per session: Not on file    Stress: Not on file   Relationships    Social connections:     Talks on phone: Not on file     Gets together: Not on file     Attends Mormonism service: Not on file     Active member of club or organization: Not on file     Attends meetings of clubs or organizations: Not on file     Relationship status: Not on file    Intimate partner violence:     Fear of current or ex partner: Not on file     Emotionally abused: Not on file     Physically abused: Not on file     Forced sexual activity: Not on file   Other Topics Concern    Not on file   Social History Narrative    Not on file       LABS:  Lab Results   Component Value Date     01/02/2020    BUN 14 01/02/2020    CREATININE 0.71 01/02/2020    TSH 1.640 01/02/2020    WBC 9.2 01/02/2020     No results found for: PHENYTOIN, PHENOBARB, VALPROATE, CBMZ  No results found for: INR, PROTIME  No results found for: APTT  No results for input(s): ETOH in the last 72 hours. ROS:  [x] All negative/unchanged except if checked.  Explain positive(checked items) below:  [] Constitutional  [] Eyes  [] Ear/Nose/Mouth/Throat  [] Respiratory  [] CV  [] GI  []   [] Musculoskeletal  [] Skin/Breast  [] Neurological  [] Endocrine  [] Heme/Lymph  [] Allergic/Immunologic    Explanation:     MEDICATIONS:    Current Facility-Administered Medications:     risperiDONE (RISPERDAL) tablet 1 mg, 1 mg, Oral, Nightly, Jo Ann Christianson MD    risperiDONE (RISPERDAL) tablet 0.25 mg, 0.25 mg, Oral, Daily, Jo Ann Christianson MD, 0.25 mg at 01/05/20 0847    nicotine (NICODERM CQ) 21 MG/24HR 1 patch, 1 patch, Transdermal, Daily, Jo Ann Christianson MD, 1 patch at 01/04/20 1432    acetaminophen (TYLENOL) tablet 650 mg, 650 mg, Oral, Q4H PRN, Afia Mckeon MD    magnesium hydroxide (MILK OF MAGNESIA) 400 MG/5ML suspension 30 mL, 30 mL, Oral, Daily PRN, Arianna Yen MD    haloperidol lactate (HALDOL) injection 5 mg, 5 mg, Intramuscular, Q6H PRN **OR** haloperidol (HALDOL) tablet 5 mg, 5 mg, Oral, Q6H PRN, Arianna Yen MD    traZODone (DESYREL) tablet 50 mg, 50 mg, Oral, Nightly PRN, Arianna Yen MD, 50 mg at 01/04/20 2351    benztropine mesylate (COGENTIN) injection 2 mg, 2 mg, Intramuscular, BID PRN, Arianna Yen MD    aluminum & magnesium hydroxide-simethicone (MAALOX) 200-200-20 MG/5ML suspension 30 mL, 30 mL, Oral, Q6H PRN, Arianna Yen MD    hydrOXYzine (VISTARIL) capsule 50 mg, 50 mg, Oral, Q6H PRN, 50 mg at 01/05/20 1335 **OR** hydrOXYzine (VISTARIL) injection 50 mg, 50 mg, Intramuscular, Q6H PRN, Arianna Yen MD    PSYCHOTHERAPY/COUNSELING:  [x] Therapeutic interview  [x] Supportive  [] CBT  [] Ongoing  [] Other    ASSESSMENT:  Patient is:  [x] Well controlled  [] Improving  [] Worsening  [] Other    [x] Patient continues to need, on a daily basis, active treatment furnished directly by or     requiring the supervision of inpatient psychiatric personnel     Diagnosis:  Principal Problem:    [facility]  Resolved Problems:    * No resolved hospital problems. *      Treatment Plan:  [x] Continue Current Medications  [x] Continue Follow-up  [x] Continue Labs      [x] Risks, benefits, side effects, drug-to-drug interactions and alternatives to treatment were discussed in my usual manner.     Reason for more than one antipsychotic:  [x] N/A  [] 3 failed monotherapy(drugs tried):  [] Cross over to a new antipsychotic  [] Taper to monotherapy from polypharmacy  [] Augmentation of Clozapine therapy due to treatment resistance to single therapy      Electronically signed by Rommel Salgado MD on 1/5/2020 at 7:14 PM

## 2020-01-06 NOTE — PROGRESS NOTES
Pt. refused to attend the 1000 skills group, despite staff encouragement. Electronically signed by Leida Blackwell, [address] on 1/6/2020 at 12:24 PM

## 2020-01-07 ENCOUNTER — HOSPITAL ENCOUNTER (INPATIENT)
Age: 25
LOS: 10 days | Discharge: HOME | DRG: 885 | End: 2020-01-17
Attending: EMERGENCY MEDICINE | Admitting: PSYCHIATRY & NEUROLOGY
Payer: COMMERCIAL

## 2020-01-07 PROBLEM — F22 PSYCHOSIS, PARANOID (HCC): Status: ACTIVE | Noted: 2020-01-07

## 2020-01-07 LAB
ACETAMINOPHEN LEVEL: <5 UG/ML (ref 10–30)
AMPHETAMINE SCREEN, URINE: ABNORMAL
ANION GAP SERPL CALCULATED.3IONS-SCNC: 14 MEQ/L (ref 9–15)
BARBITURATE SCREEN URINE: ABNORMAL
BASOPHILS ABSOLUTE: 0.1 K/UL (ref 0–0.2)
BASOPHILS RELATIVE PERCENT: 0.7 %
BENZODIAZEPINE SCREEN, URINE: ABNORMAL
BILIRUBIN URINE: NEGATIVE
BLOOD, URINE: NEGATIVE
BUN BLDV-MCNC: 19 MG/DL (ref 6–20)
CALCIUM SERPL-MCNC: 9.7 MG/DL (ref 8.5–9.9)
CANNABINOID SCREEN URINE: POSITIVE
CHLORIDE BLD-SCNC: 101 MEQ/L (ref 95–107)
CHOLESTEROL, TOTAL: 168 MG/DL (ref 0–199)
CK MB: 1.9 NG/ML (ref 0–6.7)
CLARITY: CLEAR
CO2: 22 MEQ/L (ref 20–31)
COCAINE METABOLITE SCREEN URINE: ABNORMAL
COLOR: YELLOW
CREAT SERPL-MCNC: 0.68 MG/DL (ref 0.7–1.2)
CREATINE KINASE-MB INDEX: 0.8 % (ref 0–3.5)
EKG ATRIAL RATE: 89 BPM
EKG P AXIS: 13 DEGREES
EKG P-R INTERVAL: 134 MS
EKG Q-T INTERVAL: 376 MS
EKG QRS DURATION: 86 MS
EKG QTC CALCULATION (BAZETT): 457 MS
EKG R AXIS: 22 DEGREES
EKG T AXIS: 32 DEGREES
EKG VENTRICULAR RATE: 89 BPM
EOSINOPHILS ABSOLUTE: 0.2 K/UL (ref 0–0.7)
EOSINOPHILS RELATIVE PERCENT: 2.3 %
ETHANOL PERCENT: NORMAL G/DL
ETHANOL: <10 MG/DL (ref 0–0.08)
GFR AFRICAN AMERICAN: >60
GFR NON-AFRICAN AMERICAN: >60
GLUCOSE BLD-MCNC: 98 MG/DL (ref 70–99)
GLUCOSE URINE: NEGATIVE MG/DL
HCT VFR BLD CALC: 47 % (ref 42–52)
HDLC SERPL-MCNC: 55 MG/DL (ref 40–59)
HEMOGLOBIN: 16 G/DL (ref 14–18)
KETONES, URINE: NEGATIVE MG/DL
LDL CHOLESTEROL CALCULATED: 103 MG/DL (ref 0–129)
LEUKOCYTE ESTERASE, URINE: NEGATIVE
LYMPHOCYTES ABSOLUTE: 1.2 K/UL (ref 1–4.8)
LYMPHOCYTES RELATIVE PERCENT: 13.4 %
Lab: ABNORMAL
MCH RBC QN AUTO: 32.6 PG (ref 27–31.3)
MCHC RBC AUTO-ENTMCNC: 33.9 % (ref 33–37)
MCV RBC AUTO: 96.2 FL (ref 80–100)
METHADONE SCREEN, URINE: ABNORMAL
MONOCYTES ABSOLUTE: 0.6 K/UL (ref 0.2–0.8)
MONOCYTES RELATIVE PERCENT: 7 %
NEUTROPHILS ABSOLUTE: 6.7 K/UL (ref 1.4–6.5)
NEUTROPHILS RELATIVE PERCENT: 76.6 %
NITRITE, URINE: NEGATIVE
OPIATE SCREEN URINE: ABNORMAL
OXYCODONE URINE: ABNORMAL
PDW BLD-RTO: 13.3 % (ref 11.5–14.5)
PH UA: 5.5 (ref 5–9)
PHENCYCLIDINE SCREEN URINE: ABNORMAL
PLATELET # BLD: 227 K/UL (ref 130–400)
POTASSIUM REFLEX MAGNESIUM: 3.7 MEQ/L (ref 3.4–4.9)
PROPOXYPHENE SCREEN: ABNORMAL
PROTEIN UA: NEGATIVE MG/DL
RBC # BLD: 4.89 M/UL (ref 4.7–6.1)
SODIUM BLD-SCNC: 137 MEQ/L (ref 135–144)
SPECIFIC GRAVITY UA: 1.01 (ref 1–1.03)
TOTAL CK: 240 U/L (ref 0–190)
TRIGL SERPL-MCNC: 50 MG/DL (ref 0–150)
TSH SERPL DL<=0.05 MIU/L-ACNC: 1.92 UIU/ML (ref 0.44–3.86)
URINE REFLEX TO CULTURE: NORMAL
UROBILINOGEN, URINE: 0.2 E.U./DL
WBC # BLD: 8.7 K/UL (ref 4.8–10.8)

## 2020-01-07 PROCEDURE — G0396 ALCOHOL/SUBS INTERV 15-30MN: HCPCS

## 2020-01-07 PROCEDURE — 6370000000 HC RX 637 (ALT 250 FOR IP): Performed by: EMERGENCY MEDICINE

## 2020-01-07 PROCEDURE — 1240000000 HC EMOTIONAL WELLNESS R&B

## 2020-01-07 PROCEDURE — 82553 CREATINE MB FRACTION: CPT

## 2020-01-07 PROCEDURE — 6370000000 HC RX 637 (ALT 250 FOR IP): Performed by: HOSPITALIST

## 2020-01-07 PROCEDURE — 81003 URINALYSIS AUTO W/O SCOPE: CPT

## 2020-01-07 PROCEDURE — 6370000000 HC RX 637 (ALT 250 FOR IP): Performed by: PSYCHIATRY & NEUROLOGY

## 2020-01-07 PROCEDURE — 80048 BASIC METABOLIC PNL TOTAL CA: CPT

## 2020-01-07 PROCEDURE — 99285 EMERGENCY DEPT VISIT HI MDM: CPT

## 2020-01-07 PROCEDURE — 82550 ASSAY OF CK (CPK): CPT

## 2020-01-07 PROCEDURE — G0480 DRUG TEST DEF 1-7 CLASSES: HCPCS

## 2020-01-07 PROCEDURE — 84443 ASSAY THYROID STIM HORMONE: CPT

## 2020-01-07 PROCEDURE — 80061 LIPID PANEL: CPT

## 2020-01-07 PROCEDURE — 80307 DRUG TEST PRSMV CHEM ANLYZR: CPT

## 2020-01-07 PROCEDURE — 93005 ELECTROCARDIOGRAM TRACING: CPT | Performed by: PSYCHIATRY & NEUROLOGY

## 2020-01-07 PROCEDURE — 85025 COMPLETE CBC W/AUTO DIFF WBC: CPT

## 2020-01-07 PROCEDURE — 36415 COLL VENOUS BLD VENIPUNCTURE: CPT

## 2020-01-07 RX ORDER — LORAZEPAM 2 MG/ML
1 INJECTION INTRAMUSCULAR ONCE
Status: DISCONTINUED | OUTPATIENT
Start: 2020-01-07 | End: 2020-01-08

## 2020-01-07 RX ORDER — LORAZEPAM 1 MG/1
1 TABLET ORAL ONCE
Status: COMPLETED | OUTPATIENT
Start: 2020-01-07 | End: 2020-01-07

## 2020-01-07 RX ORDER — ZIPRASIDONE MESYLATE 20 MG/ML
20 INJECTION, POWDER, LYOPHILIZED, FOR SOLUTION INTRAMUSCULAR EVERY 12 HOURS PRN
Status: DISCONTINUED | OUTPATIENT
Start: 2020-01-07 | End: 2020-01-17 | Stop reason: HOSPADM

## 2020-01-07 RX ORDER — LORAZEPAM 2 MG/ML
1 INJECTION INTRAMUSCULAR EVERY 6 HOURS PRN
Status: DISCONTINUED | OUTPATIENT
Start: 2020-01-07 | End: 2020-01-17 | Stop reason: HOSPADM

## 2020-01-07 RX ORDER — RISPERIDONE 0.5 MG/1
0.5 TABLET, FILM COATED ORAL
Status: DISCONTINUED | OUTPATIENT
Start: 2020-01-08 | End: 2020-01-08

## 2020-01-07 RX ORDER — HALOPERIDOL 5 MG/ML
5 INJECTION INTRAMUSCULAR EVERY 6 HOURS PRN
Status: DISCONTINUED | OUTPATIENT
Start: 2020-01-07 | End: 2020-01-17 | Stop reason: HOSPADM

## 2020-01-07 RX ORDER — RISPERIDONE 1 MG/1
1 TABLET, FILM COATED ORAL NIGHTLY
Status: DISCONTINUED | OUTPATIENT
Start: 2020-01-07 | End: 2020-01-08

## 2020-01-07 RX ORDER — HALOPERIDOL 5 MG
5 TABLET ORAL EVERY 6 HOURS PRN
Status: DISCONTINUED | OUTPATIENT
Start: 2020-01-07 | End: 2020-01-17 | Stop reason: HOSPADM

## 2020-01-07 RX ORDER — HYDROXYZINE PAMOATE 50 MG/1
50 CAPSULE ORAL EVERY 6 HOURS PRN
Status: DISCONTINUED | OUTPATIENT
Start: 2020-01-07 | End: 2020-01-17 | Stop reason: HOSPADM

## 2020-01-07 RX ORDER — ACETAMINOPHEN 325 MG/1
650 TABLET ORAL EVERY 4 HOURS PRN
Status: DISCONTINUED | OUTPATIENT
Start: 2020-01-07 | End: 2020-01-17 | Stop reason: HOSPADM

## 2020-01-07 RX ORDER — LORAZEPAM 1 MG/1
1 TABLET ORAL EVERY 6 HOURS PRN
Status: DISCONTINUED | OUTPATIENT
Start: 2020-01-07 | End: 2020-01-17 | Stop reason: HOSPADM

## 2020-01-07 RX ADMIN — HALOPERIDOL 5 MG: 5 TABLET ORAL at 15:44

## 2020-01-07 RX ADMIN — LORAZEPAM 1 MG: 1 TABLET ORAL at 12:38

## 2020-01-07 RX ADMIN — HYDROXYZINE PAMOATE 50 MG: 50 CAPSULE ORAL at 21:05

## 2020-01-07 RX ADMIN — HYDROXYZINE PAMOATE 50 MG: 50 CAPSULE ORAL at 15:44

## 2020-01-07 RX ADMIN — LORAZEPAM 1 MG: 1 TABLET ORAL at 21:03

## 2020-01-07 RX ADMIN — RISPERIDONE 1 MG: 1 TABLET ORAL at 21:02

## 2020-01-07 RX ADMIN — NICOTINE POLACRILEX 2 MG: 2 GUM, CHEWING BUCCAL at 21:33

## 2020-01-07 ASSESSMENT — SLEEP AND FATIGUE QUESTIONNAIRES
DO YOU USE A SLEEP AID: YES
DIFFICULTY STAYING ASLEEP: YES
RESTFUL SLEEP: NO
DIFFICULTY FALLING ASLEEP: YES
SLEEP PATTERN: DIFFICULTY FALLING ASLEEP
DIFFICULTY ARISING: NO
DO YOU HAVE DIFFICULTY SLEEPING: YES

## 2020-01-07 ASSESSMENT — ENCOUNTER SYMPTOMS
ALLERGIC/IMMUNOLOGIC NEGATIVE: 1
RESPIRATORY NEGATIVE: 1
EYES NEGATIVE: 1
GASTROINTESTINAL NEGATIVE: 1

## 2020-01-07 NOTE — ED NOTES
Provisional Diagnosis:    Psychosis    Psychosocial and Contextual Factors:    22year old, lives with mother and father. Attends college in [place]. Recently discharged from . Presents delusional, paranoid, and agitated. Past medical history of ADHD untreated. 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 Pt was pink slipped by [facility]. Denies any use of alcohol or drugs since previous admission. Reports taking ordered medication (risperdol) but states he does not want to be on it. Good family support but pt does not feel his family is able to \"handle him\". [facility] NP recommended in patient treatment for stabilization as well as CSU step down at discharge if pt willing. C-SSRS Summary:     Patient: C-SSRS Suicide Screening  1) Within the past month, have you wished you were dead or wished you could go to sleep and not wake up? : No  2) Have you actually had any thoughts of killing yourself? : No  6) Have you ever done anything, started to do anything, or prepared to do anything to end your life?: No    Family: NA    Agency: [facility]              Clinical Summary:    Pt presents to the ER agitated with delusional thinking. Appears disheveled and states he has not showered in days. Pressured speech. Labile mood. One moment pt is tearful and stating he is afraid of the worlds end and the next moment pt is manic stating he is all knowing of music and the music speaks to him only in his L ear. Reports insomnia and poor appetite. Pt states he is worried he is not eating enough and therefore may be a diabetic. Pt also states he may have an infection in his R ear d/t using a Q tip only in that ear to help clear the voices but denies hallucinations. Poor impulse control but can redirect when becoming elevated. Very poor insight.  Pt does not feel he needs to be on \"doctor meds\" and can treat self with regular sleep and diet/exercise as well as medicinal marijuana. Pt also preoccupied with tobacco and states \"they took my patch away and now I am smoking so much I probably developed cancer\". Pupils are dilated and eyes fixated during this period. When redirected pt able to focus on nurse and task at hand. Pt does not want to be admitted to floor but was discussing how he may need inpatient treatment and was agreeable at this time.      Level of Care Disposition:      Per Dr Sharon Nascimento, RN  01/07/20 8893

## 2020-01-07 NOTE — PROGRESS NOTES
Group Therapy Note    Date: 1/7/2020  Start Time: 1430  End Time: 1500    Number of Participants:3    Type of Group: Cognitive Skills    Patient's Goal: To participate in mood management group. Notes: Patient declined to attend psychoeducation group at 1430 despite encouragement by staff.      Discipline Responsible: /Counselor    GEORGETTE Lopez

## 2020-01-07 NOTE — PROGRESS NOTES
Pt is sleeping now, awaken to give Neurontin 100 mg and pt refused, stated he doesn't want to lift his head now, refused offer for other prn med ordered and pt refused.  Informed rn pt stated vistaril was a little effective for anxiety that was given [phone]

## 2020-01-07 NOTE — PROGRESS NOTES
Pt expressed verbally ,loudly that today  is his birthday, reports he is very upset he is here, that his parents put him here, pt swore at times, pt was asked to calm down, to keep voice lowered, pt refused offer for prn , stated he would have to see the side effects of any med that was given.

## 2020-01-07 NOTE — PROGRESS NOTES
Patient did not attend 1600 group despite staff encouragement.  Electronically signed by Archie Gitelman on 1/7/2020 at 4:59 PM

## 2020-01-07 NOTE — ED NOTES
Pt arrived to [facility] with assistance from gina. Skin and contraband check completed. Pt was labile, one moment angry and loudly speaking ill of parents and the next moment tearful and discussing how he has sexual identity issues. Lab in at this time. Pt has been cooperative thus far.       Celia Coyne RN  01/07/20 1300

## 2020-01-07 NOTE — ED TRIAGE NOTES
Pt is sitting in chair d/t not wanting to be in bed. Pt is tearful, paranoid and delusional. Pressure speech and flight of ideas. Pt states music and his parents are stressing him out.

## 2020-01-07 NOTE — ED NOTES
Pt currently trying to urinate but states he is dehydrated and may not be able to. Doctor was notified of pt arrival to unit.       Teresa Valdes RN  01/07/20 4469

## 2020-01-07 NOTE — ED NOTES
Raiza Flores updating mother with pt permission with plan of care.  Mother agreeable with plan for admission       Aristeo Marte RN  01/07/20 1503

## 2020-01-07 NOTE — ED PROVIDER NOTES
[id number] ED  EMERGENCY DEPARTMENT ENCOUNTER      Pt Name: Zeus Cortez  MRN: [id number]  [place] 1995  Date of evaluation: 1/7/2020  Provider: Raphael Simons MD, MD    CHIEF COMPLAINT       Chief Complaint   Patient presents with   [facility]         HISTORY OF PRESENT ILLNESS   (Location/Symptom, Timing/Onset, Context/Setting, Quality, Duration, Modifying Factors, Severity)  Note limiting factors. Zeus Cortez is a 22 y.o. male who presents to the emergency department with paranoia for more than 1 day with a history of ADHD that he is noncompliant with medicines for. No precipitating factors. Nothing seems to make the symptoms better or worse and denies  SI or HI. No associated medical complaints    HPI    Nursing Notes were reviewed. REVIEW OF SYSTEMS    (2-9 systems for level 4, 10 or more for level 5)     Review of Systems   Constitutional: Negative. HENT: Negative. Eyes: Negative. Respiratory: Negative. Cardiovascular: Negative. Gastrointestinal: Negative. Endocrine: Negative. Genitourinary: Negative. Musculoskeletal: Negative. Skin: Negative. Allergic/Immunologic: Negative. Neurological: Negative. Hematological: Negative. Psychiatric/Behavioral:        As per HPI       Except as noted above the remainder of the review of systems was reviewed and negative. PAST MEDICAL HISTORY   No past medical history on file. SURGICAL HISTORY     No past surgical history on file. CURRENT MEDICATIONS       Previous Medications    RISPERIDONE (RISPERDAL) 0.5 MG TABLET    1 tab qam and 2 tab qhs       ALLERGIES     Patient has no known allergies.     FAMILY HISTORY       Family History   Family history unknown: Yes          SOCIAL HISTORY       Social History     Socioeconomic History    Marital status: Single     Spouse name: Not on file    Number of children: Not on file    Years of education: Not on file    Highest education level: Not on file   Occupational History    Not on file   Social Needs    Financial resource strain: Not on file    Food insecurity:     Worry: Not on file     Inability: Not on file    Transportation needs:     Medical: Not on file     Non-medical: Not on file   Tobacco Use    Smoking status: Not on file   Substance and Sexual Activity    Alcohol use: Not on file    Drug use: Not on file    Sexual activity: Not on file   Lifestyle    Physical activity:     Days per week: Not on file     Minutes per session: Not on file    Stress: Not on file   Relationships    Social connections:     Talks on phone: Not on file     Gets together: Not on file     Attends Voodoo service: Not on file     Active member of club or organization: Not on file     Attends meetings of clubs or organizations: Not on file     Relationship status: Not on file    Intimate partner violence:     Fear of current or ex partner: Not on file     Emotionally abused: Not on file     Physically abused: Not on file     Forced sexual activity: Not on file   Other Topics Concern    Not on file   Social History Narrative    Not on file       SCREENINGS               PHYSICAL EXAM    (up to 7 for level 4, 8 or more for level 5)     ED Triage Vitals [01/07/20 0936]   BP Temp Temp Source Pulse Resp SpO2 Height Weight   124/74 98.9 °F (37.2 °C) Oral 90 16 100 % 5' 7\" (1.702 m) 130 lb (59 kg)       Physical Exam  Constitutional:       Appearance: He is well-developed. HENT:      Head: Normocephalic and atraumatic. Eyes:      Pupils: Pupils are equal, round, and reactive to light. Neck:      Musculoskeletal: Normal range of motion and neck supple. Cardiovascular:      Rate and Rhythm: Normal rate and regular rhythm. Pulmonary:      Effort: Pulmonary effort is normal.      Breath sounds: Normal breath sounds. Abdominal:      Palpations: Abdomen is soft. Musculoskeletal: Normal range of motion.    Skin:     General: Skin is warm and dry. Neurological:      Mental Status: He is alert and oriented to person, place, and time. Psychiatric:      Comments: Pressured speech and paranoid but denies SI or HI         DIAGNOSTIC RESULTS     EKG: All EKG's are interpreted by the Emergency Department Physician who either signs or Co-signs this chart in the absence of a cardiologist.    10:29 AM shows a normal sinus rhythm at 89 bpm normal intervals normal axis no ischemia        Interpretation per the Radiologist below, if available at the time of this note:    No orders to display         ED BEDSIDE ULTRASOUND:   Performed by ED Physician - none    LABS:  Labs Reviewed   CBC WITH AUTO DIFFERENTIAL - Abnormal; Notable for the following components:       Result Value    MCH 32.6 (*)     Neutrophils Absolute 6.7 (*)     All other components within normal limits   ETHANOL   BASIC METABOLIC PANEL W/ REFLEX TO MG FOR LOW K   TSH WITHOUT REFLEX   ACETAMINOPHEN LEVEL   CK   URINE DRUG SCREEN   URINE RT REFLEX TO CULTURE   LIPID PANEL       All other labs were within normal range or not returned as of this dictation. EMERGENCY DEPARTMENT COURSE and DIFFERENTIAL DIAGNOSIS/MDM:   Vitals:    Vitals:    01/07/20 0936   BP: 124/74   Pulse: 90   Resp: 16   Temp: 98.9 °F (37.2 °C)   TempSrc: Oral   SpO2: 100%   Weight: 130 lb (59 kg)   Height: 5' 7\" (8.200 m)       Uncertain for acute psychosis but is medically stable for mental health evaluation    MDM      REASSESSMENT              CONSULTS:  None    PROCEDURES:  Unless otherwise noted below, none     Procedures        FINAL IMPRESSION    #1 paranoia  2. Acute psychosis  DISPOSITION/PLAN   DISPOSITION      Admit psychiatry 1211 pm Dr. Zaheer Gutierrez:  No follow-up provider specified. DISCHARGE MEDICATIONS:  New Prescriptions    No medications on file     Controlled Substances Monitoring:     No flowsheet data found.     (Please note that portions of this note were completed with a voice

## 2020-01-08 PROCEDURE — 6370000000 HC RX 637 (ALT 250 FOR IP): Performed by: HOSPITALIST

## 2020-01-08 PROCEDURE — 1240000000 HC EMOTIONAL WELLNESS R&B

## 2020-01-08 PROCEDURE — 99223 1ST HOSP IP/OBS HIGH 75: CPT | Performed by: PSYCHIATRY & NEUROLOGY

## 2020-01-08 PROCEDURE — 6370000000 HC RX 637 (ALT 250 FOR IP): Performed by: PSYCHIATRY & NEUROLOGY

## 2020-01-08 RX ORDER — DIVALPROEX SODIUM 250 MG/1
250 TABLET, DELAYED RELEASE ORAL EVERY 8 HOURS SCHEDULED
Status: DISCONTINUED | OUTPATIENT
Start: 2020-01-08 | End: 2020-01-17 | Stop reason: HOSPADM

## 2020-01-08 RX ORDER — PALIPERIDONE 6 MG/1
6 TABLET, EXTENDED RELEASE ORAL DAILY
Status: DISCONTINUED | OUTPATIENT
Start: 2020-01-08 | End: 2020-01-17 | Stop reason: HOSPADM

## 2020-01-08 RX ADMIN — DIVALPROEX SODIUM 250 MG: 250 TABLET, DELAYED RELEASE ORAL at 21:12

## 2020-01-08 RX ADMIN — HYDROXYZINE PAMOATE 50 MG: 50 CAPSULE ORAL at 21:12

## 2020-01-08 RX ADMIN — PALIPERIDONE 6 MG: 6 TABLET, EXTENDED RELEASE ORAL at 10:52

## 2020-01-08 RX ADMIN — DIVALPROEX SODIUM 250 MG: 250 TABLET, DELAYED RELEASE ORAL at 13:21

## 2020-01-08 RX ADMIN — NICOTINE POLACRILEX 2 MG: 2 GUM, CHEWING BUCCAL at 08:56

## 2020-01-08 RX ADMIN — NICOTINE POLACRILEX 2 MG: 2 GUM, CHEWING BUCCAL at 13:43

## 2020-01-08 RX ADMIN — NICOTINE POLACRILEX 2 MG: 2 GUM, CHEWING BUCCAL at 17:55

## 2020-01-08 ASSESSMENT — LIFESTYLE VARIABLES: HISTORY_ALCOHOL_USE: NO

## 2020-01-08 ASSESSMENT — PATIENT HEALTH QUESTIONNAIRE - PHQ9: SUM OF ALL RESPONSES TO PHQ QUESTIONS 1-9: 0

## 2020-01-08 NOTE — H&P
[facility]ICINE    HISTORY AND PHYSICAL EXAM    PATIENT NAME:  Ale Hutton    MRN:  [id number]  SERVICE DATE:  1/8/2020   SERVICE TIME:  10:56 AM    Primary Care Physician: Karen Arnold MD         SUBJECTIVE  CHIEF COMPLAINT:  Medical clear for inpatient psychiatry admission. Consult for medical H/P encounter. HPI:  This is a 22 y.o. male admitted to [facility] with complaint of increasing agitation and delusional thinking.   Patient however denies any shortness of breath, chest pain, nausea, vomiting, fever, chills, constipation, diarrhea    PAST MEDICAL HISTORY:seizures  PAST SURGICAL HISTORY: Patient denies any surgical history    FAMILY HISTORY:    Family History   Family history unknown: Yes     SOCIAL HISTORY:    Social History     Socioeconomic History    Marital status: Single     Spouse name: Not on file    Number of children: Not on file    Years of education: Not on file    Highest education level: Not on file   Occupational History    Not on file   Social Needs    Financial resource strain: Not on file    Food insecurity:     Worry: Not on file     Inability: Not on file    Transportation needs:     Medical: Not on file     Non-medical: Not on file   Tobacco Use    Smoking status: Not on file   Substance and Sexual Activity    Alcohol use: Not on file    Drug use: Not on file    Sexual activity: Not on file   Lifestyle    Physical activity:     Days per week: Not on file     Minutes per session: Not on file    Stress: Not on file   Relationships    Social connections:     Talks on phone: Not on file     Gets together: Not on file     Attends Jain service: Not on file     Active member of club or organization: Not on file     Attends meetings of clubs or organizations: Not on file     Relationship status: Not on file    Intimate partner violence:     Fear of current or ex partner: Not on file     Emotionally abused: Not on file     Physically abused: Not on file findings, medical concerns, or conditions that we have and have not addressed during this encounter.     Plan of care discussed with: patient    SIGNATURE: JOURDAN Figueroa CNP  DATE: January 8, 2020  TIME: 10:56 AM

## 2020-01-08 NOTE — CARE COORDINATION
Pt. Denies all. Denies depression and 7/10 anxiety. Pressured speech with FOI noted. Tangential.  Could not keep pt. On one subject. Pt. Reports feeling \"scared\" of medications. States his mother has a bad liver and believes genetically he will automatically have one also. Pt. Brad Fair his mother's liver problems on all the medications she's taken during her lifetime. Pt. Began to cry and says, \"I can't cry; I'm on a mood stabilizer. \"  Labile mood. Pt. Began pressured speech again and stopped crying. Paranoia noted. Attempted to have pt. Sign consents and pt. Reading every single line. Pt. Was unable to trust brief description and refused to sign. Pt. Has been pacing unit all day. Singing to self also. PRN's offered, but pt. Refused.

## 2020-01-08 NOTE — PROGRESS NOTES
Pt. attended the 0900 community meeting.  Electronically signed by Yogi Walden on 1/8/2020 at 10:02 AM

## 2020-01-08 NOTE — PROGRESS NOTES
Pt. presents with loud voice and pressured speech. Angry at parents for bringing him back to the hospital. Blames his \"parents generation for not raising the younger genration the right way. \"  Argumentative. Irritable edge. Familiar with [facility] and activities staff from recent past admission. \"I like to debate and argue things. \"   Frequently interrupts. Flat affect. Poor sleep and reports starving himself for no reason. Has supportive friends and family but has  a tendency  to isolate. Is currently on winter break from college. Denies AH/VH and has no current si/hi. Denies drinking ETOH and does not use drugs. Does smoke marijuana daily.   Stressors include  1.people who interrupt me  2.overbearing people  3.body language and argumentative people  *play [employer] and social media  Electronically signed by ALFONSO Soto on 1/8/2020 at 12:30 PM

## 2020-01-08 NOTE — CARE COORDINATION
Brief Intervention and Referral to Treatment Summary    Patient was provided PHQ-9, AUDIT and DAST Screening:      PHQ-9 Score: 0  AUDIT Score:  0  DAST Score:  1    Patients substance use is considered     Low Risk/Healthy x  Moderate Risk  Harmful  Dependent    Patients depression is considered:     Minimal x  Mild   Moderate  Moderately Severe  Severe    Brief Education Was Provided  Patient was receptive    Brief Intervention Is Provided (Only for AUDIT or DAST)   Patient denies readiness to decrease and/or stop use and a plan was not discussed    Recommendations/Referrals for Brief and/or Specialized Treatment Provided to Patient   Patient will return home and follow up with the [facility]

## 2020-01-08 NOTE — CARE COORDINATION
[facility] Biopsychosocial Assessment    Current Level of Psychosocial Functioning     Independent x  Dependent    Minimal Assist     Comments:  Patient is a single 23 y/o male who is currently a college student. Patient now on winter break visiting his parents. Psychosocial High Risk Factors (check all that apply)    Unable to obtain meds   Chronic illness/pain    Substance abuse x  Lack of Family Support   Financial stress   Isolation   Inadequate Community Resources  Suicide attempt(s)  Not taking medications x  Victim of crime   Developmental Delay  Unable to manage personal needs    Age 72 or older   Homeless  No transportation   Readmission within 30 days  Unemployment  Traumatic Event    Psychiatric Advanced Directive: not on file    Family to involve in treatment: both parents    Sexual Orientation:  homosexual    Patient Strengths: family support    Patient Barriers: lacks insinght    Opiate education provided: yes    Safety plan: completed    CMHC/MH history: [facility] client    Plan of Care: Medication management, group/individual therapies, family meetings, psycho -education, treatment team meetings to assist with stabilization    Initial Discharge Plan:  Patient will return home and follow up with the [address] [place] [address] Avenue:   Patient was admitted to [facility] after being d/c 24 hours prior. Patient was agitated upon arrival however presets in a calmer manor during his assessment. Patient states he will participate in [facility] therapeutic milieu.

## 2020-01-08 NOTE — PROGRESS NOTES
Pt went to 2:30 group now as encouraged to , stated as he was walking the cline, that dr is spying on him. Reinforced to pt that doctor and staff are trying to help him. Pt was arguementive with conversation after taking Depakote, Pt stated his mom has problems with her liver and he cant take that med because of his liver, that they are very close. Pt stated that he hasn't master bated in a while and is full of anxiety.

## 2020-01-08 NOTE — H&P
with poor sleep and appetite  Pt has not used CBD oil after discharge, mom cleared up his room and he was not happy about that either  Pt believe that human voice go through rt ear and when the brain does not hear the voice then it clogs. Pt use the q tip to clear the wax if he want to hear the voice  Pt had a near death experience on Dec 13th - tried to go home but his car broke down on a bridge  He thought that he should jump off a bridge to restart the game because life is just a game. Started seeing planet in front of him, moon and earth disappearing  I have a mission to do in this earth and stop hatred  Need to be like Lei Failing  Pt insight and judgment is poor      Medications Prior to Admission:   Medications Prior to Admission: risperiDONE (RISPERDAL) 0.5 MG tablet, 1 tab qam and 2 tab qhs    Compliance:no    Psychiatric Review of Systems       Depression: yes     Tuyet or Hypomania:  no     Panic Attacks:  no     Phobias:  no     Obsessions and Compulsions:  no     PTSD : no     Hallucinations:  yes      Delusions:  yes     Substance Abuse History:  ETOH: no   Marijuana: yes - CBD oil and THC  Opiates: no  Other Drugs: no      Past Psychiatric History:  Prior Diagnosis:  Psychosis  Psychiatrist: no  Therapist:no  Hospitalization: yes  Hx of Suicidal Attempts: no  Hx of violence:  no  ECT: no  Previous discontinued Psychiatric Med Trials:     Past Medical History:    No past medical history on file. Past Surgical History:    No past surgical history on file. Allergies:   Patient has no known allergies.     Family History  Family History   Family history unknown: Yes         Social History:  Born and Raised: [place]  Describes Childhood:   supportive  Education: [employer], in college  Employment: Unemployed, not seeking work  Relationships: single  Children: no children  Current Support: parents    Legal Hx: none  Access to weapons?:  No      EXAMINATION:    REVIEW OF SYSTEMS:    ROS:  [x] All negative/unchanged except if checked. Explain positive(checked items) below:  [] Constitutional  [] Eyes  [] Ear/Nose/Mouth/Throat  [] Respiratory  [] CV  [] GI  []   [] Musculoskeletal  [] Skin/Breast  [] Neurological  [] Endocrine  [] Heme/Lymph  [] Allergic/Immunologic    Explanation:     Vitals:  /89   Pulse 112   Temp 98 °F (36.7 °C) (Oral)   Resp 20   Ht 5' 7\" (1.702 m)   Wt 130 lb (59 kg)   SpO2 100%   BMI 20.36 kg/m²      Neurologic Exam:   Muscle Strength & Tone: full ROM  Gait: normal gait   Involuntary Movements: No    Mental Status Examination:    Level of consciousness:  within normal limits   Appearance:  ill-appearing  Behavior/Motor:  responding to internal stimuli  Attitude toward examiner:  cooperative  Speech:  slow   Mood:angry irritable  Affect:  mood incongruent  Thought processes:  Disorganized, flight of ideas  Thought content:  Suicidal Ideation:  passive  Delusions:  Paranoid delusion, grandiose ++  Perceptual Disturbance:  AH++  Cognition:  oriented to person, place, and time   Concentration intact  Memory intact  Insight poor   Judgement poor   Fund of Knowledge limited    Mini Mental Status 30/30      DIAGNOSIS:  Schizoaffective disorder - bipolar type   Substance-induced (MJ) psychotic disorder          RISK ASSESSMENT:    SUICIDE RISK ASSESSMENT: high  HOMICIDE: low  AGITATION/VIOLENCE: moderate  ELOPEMENT: high    LABS: REVIEWED TODAY:  Recent Labs     01/07/20  0953   WBC 8.7   HGB 16.0        Recent Labs     01/07/20  0953      K 3.7      CO2 22   BUN 19   CREATININE 0.68*   GLUCOSE 98     No results for input(s): BILITOT, ALKPHOS, AST, ALT in the last 72 hours.   Lab Results   Component Value Date    LABAMPH Neg 01/07/2020    BARBSCNU Neg 01/07/2020    LABBENZ Neg 01/07/2020    LABMETH Neg 01/07/2020    OPIATESCREENURINE Neg 01/07/2020    PHENCYCLIDINESCREENURINE Neg 01/07/2020    ETOH <10 01/07/2020     Lab Results   Component Value Date    TSH

## 2020-01-08 NOTE — PROGRESS NOTES
Patient did not attend 2100 group despite staff encouragement.  Electronically signed by Gwenette Brunner on 1/7/2020 at 9:28 PM

## 2020-01-08 NOTE — PROGRESS NOTES
Pt was residing in room 376,requested to go in original room 374,stating a blue light from the outside shining in his room. Staff noted a security light on outside near his window.  Pt to room 374 and resting with eyes closed post 2330

## 2020-01-08 NOTE — PROGRESS NOTES
Pt noted on phone call he stated was with his mom, appeared to be argumentive, pt stated you she was making him angry, and trying to control him. Pt was given nict gum as requested.

## 2020-01-08 NOTE — CARE COORDINATION
Patient's parents on the floor to visit. Parents gave HIPPA code and asked to speak to nurse. Patient elevated and yelling, \"They're my trigger! \"  Asked parents to step aside to speak in private. Parents concerned patient is going to be discharged too soon. Patient approached mother and began to yell and invade mother's space. Pt. Poked mother in the chest twice and said, \"You're my problem! \"  Staff asked pt. To respect personal space and to keep hands to self. Pt. Roddy Phillips away and parents left.

## 2020-01-09 PROBLEM — F12.90 MARIJUANA USE, CONTINUOUS: Status: ACTIVE | Noted: 2020-01-09

## 2020-01-09 PROBLEM — F25.0 SCHIZOAFFECTIVE DISORDER, BIPOLAR TYPE (HCC): Status: ACTIVE | Noted: 2020-01-07

## 2020-01-09 PROCEDURE — 6370000000 HC RX 637 (ALT 250 FOR IP): Performed by: PSYCHIATRY & NEUROLOGY

## 2020-01-09 PROCEDURE — 99232 SBSQ HOSP IP/OBS MODERATE 35: CPT | Performed by: PSYCHIATRY & NEUROLOGY

## 2020-01-09 PROCEDURE — 6370000000 HC RX 637 (ALT 250 FOR IP): Performed by: HOSPITALIST

## 2020-01-09 PROCEDURE — 1240000000 HC EMOTIONAL WELLNESS R&B

## 2020-01-09 RX ADMIN — NICOTINE POLACRILEX 2 MG: 2 GUM, CHEWING BUCCAL at 12:35

## 2020-01-09 RX ADMIN — PALIPERIDONE 6 MG: 6 TABLET, EXTENDED RELEASE ORAL at 08:26

## 2020-01-09 RX ADMIN — DIVALPROEX SODIUM 250 MG: 250 TABLET, DELAYED RELEASE ORAL at 21:13

## 2020-01-09 RX ADMIN — DIVALPROEX SODIUM 250 MG: 250 TABLET, DELAYED RELEASE ORAL at 06:08

## 2020-01-09 RX ADMIN — HYDROXYZINE PAMOATE 50 MG: 50 CAPSULE ORAL at 21:16

## 2020-01-09 RX ADMIN — DIVALPROEX SODIUM 250 MG: 250 TABLET, DELAYED RELEASE ORAL at 13:59

## 2020-01-09 NOTE — PROGRESS NOTES
BEHAVIORAL HEALTH FOLLOW-UP NOTE     1/9/2020     Patient was seen and examined in person, Chart reviewed   Patient's case discussed with staff/team    Chief Complaint: Psychosis    Interim History:     Pt continue to remain manic and psychotic  Parents visited him yesterday - ended up being angry and upset with them  Pt is taking medication reluctantly  Willing to take monthly shot instead of PO med  I am tired of being irritable and angry and landing up in hospital  Pt denies active SI  Pt has racing thoughts  Slept better last night    Appetite:   [] Normal/Unchanged  [] Increased  [x] Decreased      Sleep:       [] Normal/Unchanged  [x] Fair       [] Poor              Energy:    [] Normal/Unchanged  [] Increased  [x] Decreased        SI [] Present  [] Absent    HI  []Present  [] Absent     Aggression:  [] yes  [] no    Patient is [] able  [x] unable to CONTRACT FOR SAFETY     PAST MEDICAL/PSYCHIATRIC HISTORY:   No past medical history on file.     FAMILY/SOCIAL HISTORY:  Family History   Family history unknown: Yes     Social History     Socioeconomic History    Marital status: Single     Spouse name: Not on file    Number of children: Not on file    Years of education: Not on file    Highest education level: Not on file   Occupational History    Not on file   Social Needs    Financial resource strain: Not on file    Food insecurity:     Worry: Not on file     Inability: Not on file    Transportation needs:     Medical: Not on file     Non-medical: Not on file   Tobacco Use    Smoking status: Not on file   Substance and Sexual Activity    Alcohol use: Not on file    Drug use: Not on file    Sexual activity: Not on file   Lifestyle    Physical activity:     Days per week: Not on file     Minutes per session: Not on file    Stress: Not on file   Relationships    Social connections:     Talks on phone: Not on file     Gets together: Not on file     Attends [employer] service: Not on file     Active member of club or organization: Not on file     Attends meetings of clubs or organizations: Not on file     Relationship status: Not on file    Intimate partner violence:     Fear of current or ex partner: Not on file     Emotionally abused: Not on file     Physically abused: Not on file     Forced sexual activity: Not on file   Other Topics Concern    Not on file   Social History Narrative    Not on file           ROS:  [x] All negative/unchanged except if checked.  Explain positive(checked items) below:  [] Constitutional  [] Eyes  [] Ear/Nose/Mouth/Throat  [] Respiratory  [] CV  [] GI  []   [] Musculoskeletal  [] Skin/Breast  [] Neurological  [] Endocrine  [] Heme/Lymph  [] Allergic/Immunologic    Explanation:     MEDICATIONS:    Current Facility-Administered Medications:     paliperidone (INVEGA) extended release tablet 6 mg, 6 mg, Oral, Daily, Husam Rivero MD, 6 mg at 01/09/20 0826    divalproex (DEPAKOTE) DR tablet 250 mg, 250 mg, Oral, 3 times per day, Husam Rivero MD, 250 mg at 01/09/20 0608    acetaminophen (TYLENOL) tablet 650 mg, 650 mg, Oral, Q4H PRN, Husam Rivero MD    magnesium hydroxide (MILK OF MAGNESIA) 400 MG/5ML suspension 30 mL, 30 mL, Oral, Daily PRN, Husam Rivero MD    hydrOXYzine (VISTARIL) capsule 50 mg, 50 mg, Oral, Q6H PRN **OR** hydrOXYzine (VISTARIL) capsule 50 mg, 50 mg, Oral, Q6H PRN, Husam Rivero MD, 50 mg at 01/08/20 2112    haloperidol (HALDOL) tablet 5 mg, 5 mg, Oral, Q6H PRN, 5 mg at 01/07/20 1544 **OR** haloperidol lactate (HALDOL) injection 5 mg, 5 mg, Intramuscular, Q6H PRN, Husam Rivero MD    ziprasidone (GEODON) injection 20 mg, 20 mg, Intramuscular, Q12H PRN, Husam Rivero MD    LORazepam (ATIVAN) injection 1 mg, 1 mg, Intramuscular, Q6H PRN **OR** LORazepam (ATIVAN) tablet 1 mg, 1 mg, Oral, Q6H PRN, Husam Rivero MD, 1 mg at 01/07/20 2103    nicotine polacrilex (NICORETTE) gum 2 mg, 2 mg, Oral, PRN, Katlin More APRN - CNP, 2 mg at 01/08/20 0605      Examination:  BP (!) 158/78 Comment: RN notified  Pulse 102   Temp 97 °F (36.1 °C) (Oral)   Resp 20   Ht 5' 7\" (1.702 m)   Wt 130 lb (59 kg)   SpO2 98%   BMI 20.36 kg/m²   Gait - steady  Medication side effects(SE): no    Mental Status Examination:    Level of consciousness:  within normal limits   Appearance:  poor grooming and poor hygiene  Behavior/Motor:  responding to internal stimuli  Attitude toward examiner:  playful  Speech:  rapid, hyperverbal and pressured   Mood: irritable and labile  Affect:  mood incongruent  Thought processes:  rapid   Thought content:  Delusions:  paranoid  Cognition:  oriented to person, place, and time   Concentration poor  Insight poor   Judgement poor     ASSESSMENT:   Patient symptoms are:  [] Well controlled  [] Improving  [] Worsening  [x] No change      Diagnosis:   Active Problems:    Schizoaffective disorder, bipolar type ([facility] [address]    Marijuana use, continuous  Resolved Problems:    * No resolved hospital problems. *      LABS:    Recent Labs     01/07/20  0953   WBC 8.7   HGB 16.0        Recent Labs     01/07/20  0953      K 3.7      CO2 22   BUN 19   CREATININE 0.68*   GLUCOSE 98     No results for input(s): BILITOT, ALKPHOS, AST, ALT in the last 72 hours. Lab Results   Component Value Date    LABAMPH Neg 01/07/2020    BARBSCNU Neg 01/07/2020    LABBENZ Neg 01/07/2020    LABMETH Neg 01/07/2020    OPIATESCREENURINE Neg 01/07/2020    PHENCYCLIDINESCREENURINE Neg 01/07/2020    ETOH <10 01/07/2020     Lab Results   Component Value Date    TSH 1.920 01/07/2020     No results found for: LITHIUM  No results found for: VALPROATE, CBMZ    RISK ASSESSMENT: still very psychotic and manic    Treatment Plan:  Reviewed current Medications with the patient.    Medication as ordered  SPRING to be considered  Recommend parents to apply for guardianship  Pt signed voluntary papers  Risks, benefits, side effects, drug-to-drug interactions

## 2020-01-09 NOTE — PROGRESS NOTES
PRN Vistaril offered and accepted for sleep.  Electronically signed by Ez Cardona LPN on 6/2/2544 at 88:68 PM

## 2020-01-09 NOTE — PROGRESS NOTES
Nutrition Assessment    Type and Reason for Visit: Initial, Positive Nutrition Screen(wt loss)    Nutrition Recommendations: Continue General diet. Start High Calorie ONS BID    Nutrition Assessment: Unable to determine nutrition status pta. At risk for nutrition compromise due to admission for MDD and report of poor appetite/po intake. Will start a nutrition supplement    Malnutrition Assessment:  · Malnutrition Status: Insufficient data  · Context: Social or environmental circumstances  · Findings of the 6 clinical characteristics of malnutrition (Minimum of 2 out of 6 clinical characteristics is required to make the diagnosis of moderate or severe Protein Calorie Malnutrition based on AND/ASPEN Guidelines):  1. Energy Intake-Unable to assess, Unable to assess    2. Weight Loss-Unable to assess,    3. Fat Loss-Unable to assess,    4. Muscle Loss-Unable to assess,    5. Fluid Accumulation-Unable to assess,    6.  Strength-Not measured    Nutrition Risk Level:  Moderate    Nutrient Needs:  · Estimated Daily Total Kcal: 4152-9526 (kg x 28-30)  · Estimated Daily Protein (g): 47-59  · Estimated Daily Total Fluid (ml/day): ~1700 ml (1 ml/kcal)    Nutrition Diagnosis:   · Problem: Predicted suboptimal energy intake  · Etiology: related to Psychological cause/life stress     Signs and symptoms:  as evidenced by Diet history of poor intake    Objective Information:  · Nutrition-Focused Physical Findings:    · Wound Type: None  · Current Nutrition Therapies:  · Oral Diet Orders: General   · Oral Diet intake: Unable to assess  · Oral Nutrition Supplement (ONS) Orders: None  · Anthropometric Measures:  · Ht: 5' 7\" (170.2 cm)   · Admission Body Wt: 130 lb (59 kg)(stated)  · Usual Body Wt: 128 lb (58.1 kg)(12/12/19-[facility] record)  · % Weight Change:  ,  UTD without wt history  · Ideal Body Wt: 148 lb (67.1 kg), % Ideal Body 88%  · BMI Classification: BMI 18.5 - 24.9 Normal Weight    Nutrition Interventions:   Continue current diet, Start ONS(Continue General diet.   Start High Calorie ONS BID)  Continued Inpatient Monitoring, Education Not Indicated    Nutrition Evaluation:   · Evaluation: Goals set   · Goals: po intake > 75% of meals and supplements    · Monitoring: Meal Intake, Supplement Intake, Weight, Pertinent Labs      Electronically signed by Brad Hernandez RD, LD on 1/9/20 at 3:57 PM

## 2020-01-09 NOTE — GROUP NOTE
Group Therapy Note    Date: 1/9/2020    Group Start Time: 1000  Group End Time: 1100  Group Topic: Psychoeducation    [facility]    Jamie Dee, JASPERS        Group Therapy Note    Attendees: 9         Patient's Goal:  \"to go home\"    Notes:  Pt. attended the 1000 skill group. Loud pressured speech. Monopolizes conversation and is at times confrontational. Does not respect other pts opinions. Got irritated and abruptly left the session near the end of group. Status After Intervention:  Decompensated    Participation Level:  Active Listener, Interactive and Monopolizing    Participation Quality: Appropriate, Attentive, Sharing, Inappropriate and Resistant      Speech:  pressured      Thought Process/Content: Flight of ideas, grandiose        Affective Functioning: Congruent and Flat      Mood: angry, elevated and irritable      Level of consciousness:  Alert, Oriented x4 and Preoccupied      Response to Learning: Resistant      Endings: None Reported    Modes of Intervention: Education, Support, Socialization and Activity      Discipline Responsible: Psychoeducational Specialist      Signature:  Mitchel Montgomery

## 2020-01-09 NOTE — GROUP NOTE
Group Therapy Note    Date: 1/8/2020    Group Start Time: 2100  Group End Time: 2115  Group Topic: Wrap-Up    [facility]    Archie Gitelman        Group Therapy Note    Attendees: 7         Patient's Goal:  To participate in wrap up group. Notes:  Patient participated in wrap up group.     Status After Intervention:  Improved    Participation Level: Interactive    Participation Quality: Appropriate and Sharing      Speech:  normal      Thought Process/Content: Logical      Affective Functioning: Flat      Mood: euthymic      Level of consciousness:  Alert and Attentive      Response to Learning: Progressing to goal      Endings: None Reported    Modes of Intervention: Support      Discipline Responsible: ticketscript      Signature:  Archie Gitelman

## 2020-01-09 NOTE — PROGRESS NOTES
Pt. attended the 0900 community meeting.  Electronically signed by Fiona Caicedo on 1/9/2020 at 10:01 AM

## 2020-01-10 PROCEDURE — 6370000000 HC RX 637 (ALT 250 FOR IP): Performed by: HOSPITALIST

## 2020-01-10 PROCEDURE — 99232 SBSQ HOSP IP/OBS MODERATE 35: CPT | Performed by: PSYCHIATRY & NEUROLOGY

## 2020-01-10 PROCEDURE — 6370000000 HC RX 637 (ALT 250 FOR IP): Performed by: PSYCHIATRY & NEUROLOGY

## 2020-01-10 PROCEDURE — 1240000000 HC EMOTIONAL WELLNESS R&B

## 2020-01-10 PROCEDURE — 93010 ELECTROCARDIOGRAM REPORT: CPT | Performed by: INTERNAL MEDICINE

## 2020-01-10 RX ADMIN — DIVALPROEX SODIUM 250 MG: 250 TABLET, DELAYED RELEASE ORAL at 21:15

## 2020-01-10 RX ADMIN — NICOTINE POLACRILEX 2 MG: 2 GUM, CHEWING BUCCAL at 09:24

## 2020-01-10 RX ADMIN — DIVALPROEX SODIUM 250 MG: 250 TABLET, DELAYED RELEASE ORAL at 06:05

## 2020-01-10 RX ADMIN — PALIPERIDONE 6 MG: 6 TABLET, EXTENDED RELEASE ORAL at 09:24

## 2020-01-10 RX ADMIN — NICOTINE POLACRILEX 2 MG: 2 GUM, CHEWING BUCCAL at 14:06

## 2020-01-10 RX ADMIN — HYDROXYZINE PAMOATE 50 MG: 50 CAPSULE ORAL at 21:18

## 2020-01-10 RX ADMIN — DIVALPROEX SODIUM 250 MG: 250 TABLET, DELAYED RELEASE ORAL at 13:55

## 2020-01-10 NOTE — GROUP NOTE
Group Therapy Note    Date: 1/9/2020    Group Start Time: 1900  Group End Time: 1945  Group Topic: Recreational    [facility]    Jorge Luis Duarte        Group Therapy Note    Attendees: 10         Patient's Goal:  To participate in group activity. Notes:  Patient participated in group activity. Patient was guessing drawings inappropriately at times.     Status After Intervention:  Unchanged    Participation Level: Interactive    Participation Quality: Attentive and Inappropriate      Speech:  loud      Thought Process/Content: Linear      Affective Functioning: Flat      Mood: euthymic      Level of consciousness:  Alert and Attentive      Response to Learning: Progressing to goal      Endings: None Reported    Modes of Intervention: Activity      Discipline Responsible: [address], [employer]      Signature:  Jorge Luis Duarte

## 2020-01-10 NOTE — PROGRESS NOTES
status: Not on file    Intimate partner violence:     Fear of current or ex partner: Not on file     Emotionally abused: Not on file     Physically abused: Not on file     Forced sexual activity: Not on file   Other Topics Concern    Not on file   Social History Narrative    Not on file           ROS:  [x] All negative/unchanged except if checked.  Explain positive(checked items) below:  [] Constitutional  [] Eyes  [] Ear/Nose/Mouth/Throat  [] Respiratory  [] CV  [] GI  []   [] Musculoskeletal  [] Skin/Breast  [] Neurological  [] Endocrine  [] Heme/Lymph  [] Allergic/Immunologic    Explanation:     MEDICATIONS:    Current Facility-Administered Medications:     paliperidone palmitate ER (INVEGA SUSTENNA) IM injection 234 mg, 234 mg, Intramuscular, Once **FOLLOWED BY** [START ON 1/17/2020] paliperidone palmitate ER (INVEGA SUSTENNA) IM injection 156 mg, 156 mg, Intramuscular, Once **FOLLOWED BY** [START ON 2/7/2020] paliperidone palmitate ER (INVEGA SUSTENNA) IM injection 117 mg, 117 mg, Intramuscular, Q30 Days, Maxine Olivas MD    paliperidone (INVEGA) extended release tablet 6 mg, 6 mg, Oral, Daily, Maxine Olivas MD, 6 mg at 01/10/20 4232    divalproex (DEPAKOTE) DR tablet 250 mg, 250 mg, Oral, 3 times per day, Maxine Olivas MD, 250 mg at 01/10/20 1355    acetaminophen (TYLENOL) tablet 650 mg, 650 mg, Oral, Q4H PRN, Maxine Olivas MD    magnesium hydroxide (MILK OF MAGNESIA) 400 MG/5ML suspension 30 mL, 30 mL, Oral, Daily PRN, Maxine Olivas MD    hydrOXYzine (VISTARIL) capsule 50 mg, 50 mg, Oral, Q6H PRN **OR** hydrOXYzine (VISTARIL) capsule 50 mg, 50 mg, Oral, Q6H PRN, Maxine Olivas MD, 50 mg at 01/09/20 2116    haloperidol (HALDOL) tablet 5 mg, 5 mg, Oral, Q6H PRN, 5 mg at 01/07/20 1544 **OR** haloperidol lactate (HALDOL) injection 5 mg, 5 mg, Intramuscular, Q6H PRN, Maxine Olivas MD    ziprasidone (GEODON) injection 20 mg, 20 mg, Intramuscular, Q12H PRN, Maxine Olivas, MD    LORazepam (ATIVAN) injection 1 mg, 1 mg, Intramuscular, Q6H PRN **OR** LORazepam (ATIVAN) tablet 1 mg, 1 mg, Oral, Q6H PRN, Peggy Anna MD, 1 mg at 01/07/20 2103    nicotine polacrilex (NICORETTE) gum 2 mg, 2 mg, Oral, PRN, Isabel Cabot, APRN - CNP, 2 mg at 01/10/20 1406      Examination:  /79   Pulse 88   Temp 98 °F (36.7 °C) (Oral)   Resp 20   Ht 5' 7\" (1.702 m)   Wt 130 lb (59 kg)   SpO2 96%   BMI 20.36 kg/m²   Gait - steady  Medication side effects(SE): no    Mental Status Examination:    Level of consciousness:  within normal limits   Appearance:  poor grooming and poor hygiene  Behavior/Motor:  responding to internal stimuli  Attitude toward examiner:  playful  Speech:  rapid, hyperverbal and pressured   Mood: irritable and labile  Affect:  mood incongruent  Thought processes:  rapid   Thought content:  Delusions:  paranoid  Cognition:  oriented to person, place, and time   Concentration poor  Insight poor   Judgement poor     ASSESSMENT:   Patient symptoms are:  [] Well controlled  [] Improving  [] Worsening  [x] No change      Diagnosis:   Active Problems:    Schizoaffective disorder, bipolar type ([facility])    Marijuana use, continuous  Resolved Problems:    * No resolved hospital problems. *      LABS:    No results for input(s): WBC, HGB, PLT in the last 72 hours. No results for input(s): NA, K, CL, CO2, BUN, CREATININE, GLUCOSE in the last 72 hours. No results for input(s): BILITOT, ALKPHOS, AST, ALT in the last 72 hours.   Lab Results   Component Value Date    LABAMPH Neg 01/07/2020    BARBSCNU Neg 01/07/2020    LABBENZ Neg 01/07/2020    LABMETH Neg 01/07/2020    OPIATESCREENURINE Neg 01/07/2020    PHENCYCLIDINESCREENURINE Neg 01/07/2020    ETOH <10 01/07/2020     Lab Results   Component Value Date    TSH 1.920 01/07/2020     No results found for: LITHIUM  No results found for: VALPROATE, CBMZ    RISK ASSESSMENT: still very psychotic and manic    Treatment Plan:  Reviewed current Medications with the patient. Medication as ordered  Start Ronny Hughes today  Recommend parents to apply for guardianship  Pt signed voluntary papers  Risks, benefits, side effects, drug-to-drug interactions and alternatives to treatment were discussed. Collateral information: reviewed  CD evaluation  Encourage patient to attend group and other milieu activities.   Discharge planning discussed with the patient and treatment team.    PSYCHOTHERAPY/COUNSELING:  [x] Therapeutic interview  [x] Supportive  [] CBT  [] Ongoing  [] Other    [x] Patient continues to need, on a daily basis, active treatment furnished directly by or requiring the supervision of inpatient psychiatric personnel      Anticipated Length of stay:            Electronically signed by Meryle Lone, MD on 1/10/2020 at 5:09 PM

## 2020-01-10 NOTE — PROGRESS NOTES
Pt very evasive during assessment with this nurse. He does report feeling \"pretty good\" today. He states he has been walking less today and thinks he just needed to relax more. Pt has been social on unit with select peers at times throughout the day. Pt denies SI/HI/AVH at this time.
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
## 2020-01-10 NOTE — GROUP NOTE
Group Therapy Note    Date: 1/10/2020    Group Start Time: 1000  Group End Time: 1100  Group Topic: Psychoeducation    [facility]    Elvia Ignacio, CTRS        Group Therapy Note    Attendees: 12         Patient's Goal:  \"I have no goals. I am locked up\"    Notes:  Pt. attended the 1000 skill group. Was talkative but in control. Less disruptive than stated above. Worked on project and listened to music. Status After Intervention:  Improved    Participation Level:  Active Listener and Interactive    Participation Quality: Appropriate, Attentive and Sharing      Speech:  normal      Thought Process/Content: Logical  Flight of ideas      Affective Functioning: Congruent and Flat      Mood: elevated and calm      Level of consciousness:  Alert, Oriented x4 and Attentive      Response to Learning: Able to change behavior and Progressing to goal      Endings: None Reported    Modes of Intervention: Education, Support, Socialization and Activity      Discipline Responsible: Psychoeducational Specialist      Signature:  Manan Ramirez

## 2020-01-10 NOTE — GROUP NOTE
Group Therapy Note    Date: 1/10/2020    Group Start Time: 1100  Group End Time: 1200  Group Topic: Psychotherapy    [facility]    Chalmer Spice        Group Therapy Note    Attendees: 9         Patient's Goal:  To participate in group therapy process    Notes:  Patient shared wide variety of topics but focused on conspiracy theories and stressed having ADHD    Status After Intervention:  Unchanged    Participation Level: Monopolizing    Participation Quality: Intrusive      Speech:  normal      Thought Process/Content: Perseverating      Affective Functioning: Congruent      Mood: anxious      Level of consciousness:  Alert and Oriented x4      Response to Learning: Able to verbalize current knowledge/experience      Endings: None Reported    Modes of Intervention: Support      Discipline Responsible: /Counselor      Signature:  Chava Luna

## 2020-01-11 PROCEDURE — 6370000000 HC RX 637 (ALT 250 FOR IP): Performed by: HOSPITALIST

## 2020-01-11 PROCEDURE — 1240000000 HC EMOTIONAL WELLNESS R&B

## 2020-01-11 PROCEDURE — 6370000000 HC RX 637 (ALT 250 FOR IP): Performed by: PSYCHIATRY & NEUROLOGY

## 2020-01-11 RX ADMIN — DIVALPROEX SODIUM 250 MG: 250 TABLET, DELAYED RELEASE ORAL at 06:18

## 2020-01-11 RX ADMIN — NICOTINE POLACRILEX 2 MG: 2 GUM, CHEWING BUCCAL at 19:27

## 2020-01-11 RX ADMIN — PALIPERIDONE 6 MG: 6 TABLET, EXTENDED RELEASE ORAL at 08:24

## 2020-01-11 RX ADMIN — NICOTINE POLACRILEX 2 MG: 2 GUM, CHEWING BUCCAL at 14:42

## 2020-01-11 RX ADMIN — DIVALPROEX SODIUM 250 MG: 250 TABLET, DELAYED RELEASE ORAL at 21:28

## 2020-01-11 RX ADMIN — DIVALPROEX SODIUM 250 MG: 250 TABLET, DELAYED RELEASE ORAL at 14:43

## 2020-01-11 NOTE — GROUP NOTE
Group Therapy Note    Date: 1/11/2020    Group Start Time: 1100  Group End Time: 1200  Group Topic: Psychotherapy    [facility] MARCIE Hull        Group Therapy Note    Attendees: 7         Patient's Goal:  To participate in group therapy process    Notes:  Patient acknowledged tendency to interrupt when someone else is speaking.   Group members acknowledged difficulty and gave feedback    Status After Intervention:  Improved    Participation Level: Interactive    Participation Quality: Intrusive      Speech:  normal      Thought Process/Content: Logical      Affective Functioning: Congruent      Mood: anxious      Level of consciousness:  Alert and Oriented x4      Response to Learning: Able to verbalize current knowledge/experience      Endings: None Reported    Modes of Intervention: Support      Discipline Responsible: /Counselor      Signature:  Layla Hull

## 2020-01-11 NOTE — FLOWSHEET NOTE
Pt has been isolating to himself. Paces unit at times. Constricted, unstable affect. Denies SI/HI/AVH and anxiety. Says he is very depressed and relates this to the shot he got yesterday. Preoccupied with the shot and its side effects. Feels shot has put him in a sour mood. Also upset that he dreamt about a mall instead of a parallel universe like he usually does, relates this to his shot as well Gets loud at times and has pressured speech with FOI. Reports sleeping and eating well.

## 2020-01-11 NOTE — PROGRESS NOTES
Pt. attended the 0900 community meeting.  Electronically signed by Artis Bryant on 1/11/2020 at 9:31 AM

## 2020-01-11 NOTE — GROUP NOTE
Group Therapy Note    Date: 1/10/2020    Group Start Time: 2050  Group End Time: 2104  Group Topic: Wrap-Up    [facility] RONNIE Burr, RN        Group Therapy Note    Attendees: 5/17         Patient's Goal:  Pt did not want to listen to music that upsets him today. Notes: Pt was calm in group, though did begin to have pressured speech when talking about music/artists and someone disagreed with what he had said.

## 2020-01-12 PROCEDURE — 6370000000 HC RX 637 (ALT 250 FOR IP): Performed by: HOSPITALIST

## 2020-01-12 PROCEDURE — 6370000000 HC RX 637 (ALT 250 FOR IP): Performed by: PSYCHIATRY & NEUROLOGY

## 2020-01-12 PROCEDURE — 1240000000 HC EMOTIONAL WELLNESS R&B

## 2020-01-12 RX ADMIN — NICOTINE POLACRILEX 2 MG: 2 GUM, CHEWING BUCCAL at 10:16

## 2020-01-12 RX ADMIN — PALIPERIDONE 6 MG: 6 TABLET, EXTENDED RELEASE ORAL at 09:23

## 2020-01-12 RX ADMIN — DIVALPROEX SODIUM 250 MG: 250 TABLET, DELAYED RELEASE ORAL at 14:08

## 2020-01-12 RX ADMIN — DIVALPROEX SODIUM 250 MG: 250 TABLET, DELAYED RELEASE ORAL at 21:21

## 2020-01-12 RX ADMIN — DIVALPROEX SODIUM 250 MG: 250 TABLET, DELAYED RELEASE ORAL at 05:55

## 2020-01-12 RX ADMIN — NICOTINE POLACRILEX 2 MG: 2 GUM, CHEWING BUCCAL at 19:20

## 2020-01-12 NOTE — PROGRESS NOTES
Pt. attended the 0900 community meeting.  Electronically signed by Candance Garner on 1/12/2020 at 11:22 AM

## 2020-01-12 NOTE — PROGRESS NOTES
Assessment done. Pt answers questions abruptly. Pressured speech and aggressive posturing at times. No outburst today. Denies all and denies depression or anxiety at this time. Pt out in common areas and talks to select peers.

## 2020-01-12 NOTE — PROGRESS NOTES
Pt. refused to attend the 1000 skills group, despite staff encouragement.  Electronically signed by Derick Bartholomew on 1/12/2020 at 11:36 AM

## 2020-01-12 NOTE — PROGRESS NOTES
Department of Psychiatry  Attending Progress Note      SUBJECTIVE:  Patient was admitted for being aggressive with his parents  Reports this is hell for him  And a long acting is causing him to be depressed  No change he is anxious  Talking about diabetes and how this medication is destroying him.    OBJECTIVE  Pacing agitated as conversation continues  Physical  VITALS:  /74   Pulse 95   Temp 98 °F (36.7 °C) (Oral)   Resp 18   Ht 5' 7\" (1.702 m)   Wt 130 lb (59 kg)   SpO2 99%   BMI 20.36 kg/m²   CONSTITUTIONAL:  awake  Mental Status Examination:  Level of consciousness:  within normal limits  Appearance:  ill-appearing  Behavior/Motor:  psychomotor agitation  Attitude toward examiner:  guarded  Speech:  whispered  Mood:  irritable  Affect:  intense  Thought processes:   illogical  Thought content:  Homocidal ideation denies  Suicidal Ideation:  passive  Delusions:  paranoid, grandiose, ideas of reference and thought broadcasting  Perceptual Disturbance:  denies any perceptual disturbance  Cognition:  oriented to person, place, and time  Concentration succeeded  Memory unwilling to participate  Insight nil  Judgement nil    Data  Labs:    CBC with Differential:    Lab Results   Component Value Date    WBC 8.7 01/07/2020    RBC 4.89 01/07/2020    HGB 16.0 01/07/2020    HCT 47.0 01/07/2020     01/07/2020    MCV 96.2 01/07/2020    MCH 32.6 01/07/2020    MCHC 33.9 01/07/2020    RDW 13.3 01/07/2020    LYMPHOPCT 13.4 01/07/2020    MONOPCT 7.0 01/07/2020    BASOPCT 0.7 01/07/2020    MONOSABS 0.6 01/07/2020    LYMPHSABS 1.2 01/07/2020    EOSABS 0.2 01/07/2020    BASOSABS 0.1 01/07/2020       Medications  Current Facility-Administered Medications: [COMPLETED] paliperidone palmitate ER (INVEGA SUSTENNA) IM injection 234 mg, 234 mg, Intramuscular, Once **FOLLOWED BY** [START ON 1/17/2020] paliperidone palmitate ER (INVEGA SUSTENNA) IM injection 156 mg, 156 mg, Intramuscular, Once **FOLLOWED BY** [START ON 2/7/2020] paliperidone palmitate ER (INVEGA SUSTENNA) IM injection 117 mg, 117 mg, Intramuscular, Q30 Days  paliperidone (INVEGA) extended release tablet 6 mg, 6 mg, Oral, Daily  divalproex (DEPAKOTE) DR tablet 250 mg, 250 mg, Oral, 3 times per day  acetaminophen (TYLENOL) tablet 650 mg, 650 mg, Oral, Q4H PRN  magnesium hydroxide (MILK OF MAGNESIA) 400 MG/5ML suspension 30 mL, 30 mL, Oral, Daily PRN  hydrOXYzine (VISTARIL) capsule 50 mg, 50 mg, Oral, Q6H PRN **OR** hydrOXYzine (VISTARIL) capsule 50 mg, 50 mg, Oral, Q6H PRN  haloperidol (HALDOL) tablet 5 mg, 5 mg, Oral, Q6H PRN **OR** haloperidol lactate (HALDOL) injection 5 mg, 5 mg, Intramuscular, Q6H PRN  ziprasidone (GEODON) injection 20 mg, 20 mg, Intramuscular, Q12H PRN  LORazepam (ATIVAN) injection 1 mg, 1 mg, Intramuscular, Q6H PRN **OR** LORazepam (ATIVAN) tablet 1 mg, 1 mg, Oral, Q6H PRN  nicotine polacrilex (NICORETTE) gum 2 mg, 2 mg, Oral, PRN    ASSESSMENT AND PLAN  Low carb diet  Schizophrenia  Acute  Maintain current regimen. Kenny Canales

## 2020-01-12 NOTE — PROGRESS NOTES
Ct wishes to speak with the psychiatrist about the Invega injection. Very upset, believes it is too much medication and causing depression, states, \"I am fine with the pills but the shot ,no\". Loud when visiting father, recanting all of the side effects, and left msg for mother seeking legal help. 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 Admits to using marijuana for anxiety and wants a script because it is the only thing that helps per ct statement.

## 2020-01-13 PROCEDURE — 6370000000 HC RX 637 (ALT 250 FOR IP): Performed by: PSYCHIATRY & NEUROLOGY

## 2020-01-13 PROCEDURE — 1240000000 HC EMOTIONAL WELLNESS R&B

## 2020-01-13 PROCEDURE — 99232 SBSQ HOSP IP/OBS MODERATE 35: CPT | Performed by: PSYCHIATRY & NEUROLOGY

## 2020-01-13 PROCEDURE — 6370000000 HC RX 637 (ALT 250 FOR IP): Performed by: HOSPITALIST

## 2020-01-13 RX ADMIN — NICOTINE POLACRILEX 2 MG: 2 GUM, CHEWING BUCCAL at 16:03

## 2020-01-13 RX ADMIN — NICOTINE POLACRILEX 2 MG: 2 GUM, CHEWING BUCCAL at 18:03

## 2020-01-13 RX ADMIN — NICOTINE POLACRILEX 2 MG: 2 GUM, CHEWING BUCCAL at 12:25

## 2020-01-13 RX ADMIN — DIVALPROEX SODIUM 250 MG: 250 TABLET, DELAYED RELEASE ORAL at 20:41

## 2020-01-13 RX ADMIN — LORAZEPAM 1 MG: 1 TABLET ORAL at 18:03

## 2020-01-13 RX ADMIN — PALIPERIDONE 6 MG: 6 TABLET, EXTENDED RELEASE ORAL at 10:03

## 2020-01-13 RX ADMIN — DIVALPROEX SODIUM 250 MG: 250 TABLET, DELAYED RELEASE ORAL at 13:43

## 2020-01-13 RX ADMIN — DIVALPROEX SODIUM 250 MG: 250 TABLET, DELAYED RELEASE ORAL at 06:16

## 2020-01-13 ASSESSMENT — PAIN SCALES - GENERAL: PAINLEVEL_OUTOF10: 0

## 2020-01-13 NOTE — PROGRESS NOTES
The client is alert and cooperative in the day room. He states worry regarding possibly not being discharged soon after seeing Dr. Jorden Hopkins today. He denies SI/HI/AVH and states some worry and sadness when thinking about remaining in the hospital. The client states he is wanting to return to school and classes in [facility]. The client showered this morning and denies any appetite, sleep or elimination concerns concerns. No other needs at present reported.

## 2020-01-13 NOTE — GROUP NOTE
Group Therapy Note    Date: 1/13/2020    Group Start Time: 1430  Group End Time: 0747  Group Topic: Cognitive Skills    [facility]    GEORGETTE Ovalles        Group Therapy Note    Attendees: 9         Patient's Goal:  To participate in mood management group. Notes:  Patient learned about the wellness mind. Status After Intervention:  Improved    Participation Level: Active Listener    Participation Quality: Appropriate      Speech:  normal      Thought Process/Content: Logical      Affective Functioning: Congruent      Mood: elevated      Level of consciousness:  Alert      Response to Learning: Able to verbalize current knowledge/experience      Endings: None Reported    Modes of Intervention: Education      Discipline Responsible: /Counselor      Signature:   GEORGETTE Ovalles

## 2020-01-13 NOTE — PROGRESS NOTES
Pt. attended the 0900 community meeting.  Electronically signed by Elke Holland on 1/13/2020 at 1:30 PM

## 2020-01-13 NOTE — GROUP NOTE
Group Therapy Note    Date: 1/13/2020    Group Start Time: 1100  Group End Time: 1200  Group Topic: Psychotherapy    [facility]    MIKEY Doshi        Group Therapy Note    Attendees: 8    Patients Goal: Patient will engage in group process and increase appropriate social interaction    Notes: Patient effectively used group time to achieve stated goal    Status After Intervention:  Unchanged    Participation Level: Monopolizing    Participation Quality: Sharing      Speech:  normal      Thought Process/Content: Delusional      Affective Functioning: Flat      Mood: anxious      Level of consciousness:  Attentive      Response to Learning: Able to verbalize current knowledge/experience      Endings: None Reported    Modes of Intervention: Support      Discipline Responsible: /Counselor      Signature:  MIKEY Doshi

## 2020-01-13 NOTE — PROGRESS NOTES
Pt. declined to attend the 0900 community meeting, despite staff encouragement.  Electronically signed by Kobe Moore on 1/13/2020 at 1:31 PM

## 2020-01-13 NOTE — PROGRESS NOTES
Intimate partner violence:     Fear of current or ex partner: Not on file     Emotionally abused: Not on file     Physically abused: Not on file     Forced sexual activity: Not on file   Other Topics Concern    Not on file   Social History Narrative    Not on file           ROS:  [x] All negative/unchanged except if checked.  Explain positive(checked items) below:  [] Constitutional  [] Eyes  [] Ear/Nose/Mouth/Throat  [] Respiratory  [] CV  [] GI  []   [] Musculoskeletal  [] Skin/Breast  [] Neurological  [] Endocrine  [] Heme/Lymph  [] Allergic/Immunologic    Explanation:     MEDICATIONS:    Current Facility-Administered Medications:     [COMPLETED] paliperidone palmitate ER (INVEGA SUSTENNA) IM injection 234 mg, 234 mg, Intramuscular, Once, 234 mg at 01/10/20 1917 **FOLLOWED BY** [START ON 1/17/2020] paliperidone palmitate ER (INVEGA SUSTENNA) IM injection 156 mg, 156 mg, Intramuscular, Once **FOLLOWED BY** [START ON 2/7/2020] paliperidone palmitate ER (INVEGA SUSTENNA) IM injection 117 mg, 117 mg, Intramuscular, Q30 Days, Sumanth Melgoza MD    paliperidone (INVEGA) extended release tablet 6 mg, 6 mg, Oral, Daily, Sumanth Melgoza MD, 6 mg at 01/13/20 1003    divalproex (DEPAKOTE) DR tablet 250 mg, 250 mg, Oral, 3 times per day, Sumanth Melgoza MD, 250 mg at 01/13/20 0616    acetaminophen (TYLENOL) tablet 650 mg, 650 mg, Oral, Q4H PRN, Sumanth Melgoza MD    magnesium hydroxide (MILK OF MAGNESIA) 400 MG/5ML suspension 30 mL, 30 mL, Oral, Daily PRN, Sumanth Melgoza MD    hydrOXYzine (VISTARIL) capsule 50 mg, 50 mg, Oral, Q6H PRN **OR** hydrOXYzine (VISTARIL) capsule 50 mg, 50 mg, Oral, Q6H PRN, Sumanth Melgoza MD, 50 mg at 01/10/20 2118    haloperidol (HALDOL) tablet 5 mg, 5 mg, Oral, Q6H PRN, 5 mg at 01/07/20 1544 **OR** haloperidol lactate (HALDOL) injection 5 mg, 5 mg, Intramuscular, Q6H PRN, Sumanth Melgoza MD    ziprasidone (GEODON) injection 20 mg, 20 mg, Intramuscular, Q12H PRN, Norm Daugherty Shira Palma MD    LORazepam (ATIVAN) injection 1 mg, 1 mg, Intramuscular, Q6H PRN **OR** LORazepam (ATIVAN) tablet 1 mg, 1 mg, Oral, Q6H PRN, Oniel Guillaume MD, 1 mg at 01/07/20 2103    nicotine polacrilex (NICORETTE) gum 2 mg, 2 mg, Oral, PRN, JOURDAN Max - CNP, 2 mg at 01/13/20 1225      Examination:  /76   Pulse 86   Temp 98 °F (36.7 °C) (Oral)   Resp 18   Ht 5' 7\" (1.702 m)   Wt 130 lb (59 kg)   SpO2 96%   BMI 20.36 kg/m²   Gait - steady  Medication side effects(SE): no    Mental Status Examination:    Level of consciousness:  within normal limits   Appearance:  poor grooming and poor hygiene  Behavior/Motor:  responding to internal stimuli  Attitude toward examiner:  playful  Speech:  rapid, hyperverbal and pressured   Mood: irritable and labile  Affect:  mood incongruent  Thought processes:  rapid   Thought content:  Delusions:  paranoid  Cognition:  oriented to person, place, and time   Concentration poor  Insight poor   Judgement poor     ASSESSMENT:   Patient symptoms are:  [] Well controlled  [] Improving  [] Worsening  [x] No change      Diagnosis:   Active Problems:    Schizoaffective disorder, bipolar type (HCC)    Marijuana use, continuous  Resolved Problems:    * No resolved hospital problems. *      LABS:    No results for input(s): WBC, HGB, PLT in the last 72 hours. No results for input(s): NA, K, CL, CO2, BUN, CREATININE, GLUCOSE in the last 72 hours. No results for input(s): BILITOT, ALKPHOS, AST, ALT in the last 72 hours.   Lab Results   Component Value Date    LABAMPH Neg 01/07/2020    BARBSCNU Neg 01/07/2020    LABBENZ Neg 01/07/2020    LABMETH Neg 01/07/2020    OPIATESCREENURINE Neg 01/07/2020    PHENCYCLIDINESCREENURINE Neg 01/07/2020    ETOH <10 01/07/2020     Lab Results   Component Value Date    TSH 1.920 01/07/2020     No results found for: LITHIUM  No results found for: VALPROATE, CBMZ    RISK ASSESSMENT: still psychotic and manic    Treatment Plan:  Reviewed

## 2020-01-14 LAB
ALBUMIN SERPL-MCNC: 3.7 G/DL (ref 3.5–4.6)
ALP BLD-CCNC: 60 U/L (ref 35–104)
ALT SERPL-CCNC: 8 U/L (ref 0–41)
AST SERPL-CCNC: 10 U/L (ref 0–40)
BILIRUB SERPL-MCNC: 0.8 MG/DL (ref 0.2–0.7)
BILIRUBIN DIRECT: <0.2 MG/DL (ref 0–0.4)
BILIRUBIN, INDIRECT: ABNORMAL MG/DL (ref 0–0.6)
TOTAL PROTEIN: 6.2 G/DL (ref 6.3–8)
VALPROIC ACID LEVEL: 60.1 UG/ML (ref 50–100)

## 2020-01-14 PROCEDURE — 1240000000 HC EMOTIONAL WELLNESS R&B

## 2020-01-14 PROCEDURE — 99232 SBSQ HOSP IP/OBS MODERATE 35: CPT | Performed by: PSYCHIATRY & NEUROLOGY

## 2020-01-14 PROCEDURE — 6370000000 HC RX 637 (ALT 250 FOR IP): Performed by: PSYCHIATRY & NEUROLOGY

## 2020-01-14 PROCEDURE — 6370000000 HC RX 637 (ALT 250 FOR IP): Performed by: HOSPITALIST

## 2020-01-14 PROCEDURE — 80076 HEPATIC FUNCTION PANEL: CPT

## 2020-01-14 PROCEDURE — 80164 ASSAY DIPROPYLACETIC ACD TOT: CPT

## 2020-01-14 PROCEDURE — 36415 COLL VENOUS BLD VENIPUNCTURE: CPT

## 2020-01-14 RX ADMIN — DIVALPROEX SODIUM 250 MG: 250 TABLET, DELAYED RELEASE ORAL at 14:27

## 2020-01-14 RX ADMIN — LORAZEPAM 1 MG: 1 TABLET ORAL at 17:31

## 2020-01-14 RX ADMIN — NICOTINE POLACRILEX 2 MG: 2 GUM, CHEWING BUCCAL at 17:31

## 2020-01-14 RX ADMIN — PALIPERIDONE 6 MG: 6 TABLET, EXTENDED RELEASE ORAL at 10:12

## 2020-01-14 RX ADMIN — DIVALPROEX SODIUM 250 MG: 250 TABLET, DELAYED RELEASE ORAL at 21:14

## 2020-01-14 RX ADMIN — DIVALPROEX SODIUM 250 MG: 250 TABLET, DELAYED RELEASE ORAL at 06:20

## 2020-01-14 NOTE — GROUP NOTE
Group Therapy Note    Date: 1/14/2020    Group Start Time: 1100  Group End Time: 1150  Group Topic: Psychotherapy    [facility] RONNIE Maher, [facility]        Group Therapy Note    Attendees: 11         Patient's Goal:  Going to school    Notes:  Patient stated that he wants to be out of here and the doctor is keeping him against his will    Status After Intervention:  Unchanged    Participation Level: Interactive    Participation Quality: Attentive      Speech:  normal      Thought Process/Content: Logical      Affective Functioning: Flat      Mood: irritable      Level of consciousness:  Alert      Response to Learning: Progressing to goal      Endings: None Reported    Modes of Intervention: Support      Discipline Responsible: /Counselor      Signature:  Ramírez Maher, [facility]

## 2020-01-14 NOTE — PROGRESS NOTES
Patient mentioned to this tech that be believes \"Pat has been coming into my room and reading my poetry and going through my things. \" Patient reassured.  Electronically signed by Archie Gitelman on 1/13/2020 at 9:24 PM

## 2020-01-14 NOTE — PROGRESS NOTES
Q6H PRN, Juan A Tucker MD    ziprasidone (GEODON) injection 20 mg, 20 mg, Intramuscular, Q12H PRN, Juan A Tucker MD    LORazepam (ATIVAN) injection 1 mg, 1 mg, Intramuscular, Q6H PRN **OR** LORazepam (ATIVAN) tablet 1 mg, 1 mg, Oral, Q6H PRN, Juan A Tucker MD, 1 mg at 01/13/20 1803    nicotine polacrilex (NICORETTE) gum 2 mg, 2 mg, Oral, PRN, Georgie Nash, APRN - CNP, 2 mg at 01/13/20 1803      Examination:  /71   Pulse 95   Temp 97 °F (36.1 °C) (Oral)   Resp 18   Ht 5' 7\" (1.702 m)   Wt 130 lb (59 kg)   SpO2 98%   BMI 20.36 kg/m²   Gait - steady  Medication side effects(SE): no    Mental Status Examination:    Level of consciousness:  within normal limits   Appearance:  poor grooming and poor hygiene  Behavior/Motor:  responding to internal stimuli  Attitude toward examiner:  playful  Speech:  rapid, hyperverbal and pressured   Mood: irritable and labile  Affect:  mood incongruent  Thought processes:  rapid   Thought content:  Delusions:  paranoid  Cognition:  oriented to person, place, and time   Concentration poor  Insight poor   Judgement poor     ASSESSMENT:   Patient symptoms are:  [] Well controlled  [] Improving  [] Worsening  [x] No change      Diagnosis:   Active Problems:    Schizoaffective disorder, bipolar type (HCC)    Marijuana use, continuous  Resolved Problems:    * No resolved hospital problems. *      LABS:    No results for input(s): WBC, HGB, PLT in the last 72 hours. No results for input(s): NA, K, CL, CO2, BUN, CREATININE, GLUCOSE in the last 72 hours.   Recent Labs     01/14/20  0604   BILITOT 0.8*   ALKPHOS 60   AST 10   ALT 8     Lab Results   Component Value Date    LABAMPH Neg 01/07/2020    BARBSCNU Neg 01/07/2020    LABBENZ Neg 01/07/2020    LABMETH Neg 01/07/2020    OPIATESCREENURINE Neg 01/07/2020    PHENCYCLIDINESCREENURINE Neg 01/07/2020    ETOH <10 01/07/2020     Lab Results   Component Value Date    TSH 1.920 01/07/2020     No results found for: LITHIUM  Lab Results   Component Value Date    VALPROATE 60.1 01/14/2020       RISK ASSESSMENT: still psychotic and manic    Treatment Plan:  Reviewed current Medications with the patient. Medication as ordered  Booster on friday  Recommend parents to apply for guardianship  Risks, benefits, side effects, drug-to-drug interactions and alternatives to treatment were discussed. Collateral information: reviewed  CD evaluation  Encourage patient to attend group and other milieu activities.   Discharge planning discussed with the patient and treatment team.    PSYCHOTHERAPY/COUNSELING:  [x] Therapeutic interview  [x] Supportive  [] CBT  [] Ongoing  [] Other    [x] Patient continues to need, on a daily basis, active treatment furnished directly by or requiring the supervision of inpatient psychiatric personnel      Anticipated Length of stay:            Electronically signed by López Crane MD on 1/14/2020 at 4:40 PM

## 2020-01-14 NOTE — PROGRESS NOTES
Awakened pt late am, pt was hesitant to take his am med invega, stated he is only taking it because he feels he is being forced to. reminded pt he always has a choice to  take meds. Pt was encouraged to give meds a chance and may find he likes how he feels. Pt is noted to have a book out of [employer], stated he will go to 11am group, refused 10am group because he doesn't like all the noise in art group.pt appears calmer, not as argumentative.

## 2020-01-14 NOTE — PROGRESS NOTES
Pt out on unit, but not social with peers, observed visiting with his father, during visiting hour, pressured speech  Pt reports showering today. Pt presents with clean and well kept appearance. Pt reports good appetite, at times, depends what is on his tray  Pt reports poor sleep, due to trouble falling asleep and staying asleep. Pt states, \" atmosphere is different on this unit, no fresh air, lights appear from no where. Pt rates anxiety,6 /10, request Ativan before visit, voiced his parents are out to get him, would not elaborate. Pt rates depression,0 / 10. Pt reports attending groups, not art group, D/T noise  Pt denies SI, HI and A/V hallucinations. No voiced delusions at this time. Pt alert and oriented x 4. Pt calm and cooperative. Will continue to monitor.

## 2020-01-14 NOTE — CARE COORDINATION
Call placed to Breonna Olivas, coordinator of the FIRST program at the [facility] to discuss having FIRST staff come to the unit and introduce the program to patient and his parents if he is agreeable. Voice mail left.

## 2020-01-14 NOTE — BH NOTE
Patient was avoidant of this writer and appears angry still about limits set on him yesterday during a display of inappropriate behavior during visit with his father. Visit today is far more calm and patient may be gaining small bits of awareness. He will hot allow this writer to do physical assessment today.

## 2020-01-14 NOTE — GROUP NOTE
Group Therapy Note    Date: 1/14/2020    Group Start Time: 1430  Group End Time: 2269  Group Topic: Cognitive Skills    [facility]    GEORGETTE Lopez        Group Therapy Note    Attendees: 8         Patient's Goal: To participate in mood management group. Notes:  Patient learned about the cycle of anxiety. Status After Intervention:  Improved    Participation Level: Active Listener    Participation Quality: Appropriate      Speech:  normal      Thought Process/Content: Logical      Affective Functioning: Congruent      Mood: elevated      Level of consciousness:  Alert      Response to Learning: Able to verbalize current knowledge/experience      Endings: None Reported    Modes of Intervention: Education      Discipline Responsible: /Counselor      Signature:   GEORGETTE Lopez

## 2020-01-15 PROCEDURE — 1240000000 HC EMOTIONAL WELLNESS R&B

## 2020-01-15 PROCEDURE — 99232 SBSQ HOSP IP/OBS MODERATE 35: CPT | Performed by: PSYCHIATRY & NEUROLOGY

## 2020-01-15 PROCEDURE — 6370000000 HC RX 637 (ALT 250 FOR IP): Performed by: HOSPITALIST

## 2020-01-15 PROCEDURE — 6370000000 HC RX 637 (ALT 250 FOR IP): Performed by: PSYCHIATRY & NEUROLOGY

## 2020-01-15 RX ADMIN — DIVALPROEX SODIUM 250 MG: 250 TABLET, DELAYED RELEASE ORAL at 21:26

## 2020-01-15 RX ADMIN — NICOTINE POLACRILEX 2 MG: 2 GUM, CHEWING BUCCAL at 08:42

## 2020-01-15 RX ADMIN — NICOTINE POLACRILEX 2 MG: 2 GUM, CHEWING BUCCAL at 19:11

## 2020-01-15 RX ADMIN — HYDROXYZINE PAMOATE 50 MG: 50 CAPSULE ORAL at 21:27

## 2020-01-15 RX ADMIN — DIVALPROEX SODIUM 250 MG: 250 TABLET, DELAYED RELEASE ORAL at 06:11

## 2020-01-15 RX ADMIN — DIVALPROEX SODIUM 250 MG: 250 TABLET, DELAYED RELEASE ORAL at 12:50

## 2020-01-15 RX ADMIN — PALIPERIDONE 6 MG: 6 TABLET, EXTENDED RELEASE ORAL at 08:42

## 2020-01-15 RX ADMIN — NICOTINE POLACRILEX 2 MG: 2 GUM, CHEWING BUCCAL at 12:50

## 2020-01-15 NOTE — GROUP NOTE
Group Therapy Note    Date: 1/15/2020    Group Start Time: 1600  Group End Time: 1640  Group Topic: Healthy Living/Wellness    [facility]    Sue Myrick        Group Therapy Note    Attendees: 2         Patient's Goal:  To learn about nutrition and how food fuels our bodies. Notes:  Patient participated in nutrition game.     Status After Intervention:  Improved    Participation Level: Interactive and Monopolizing    Participation Quality: Attentive and Intrusive      Speech:  loud      Thought Process/Content: Flight of ideas      Affective Functioning: Flat      Mood: euthymic      Level of consciousness:  Alert and Attentive      Response to Learning: Able to verbalize current knowledge/experience      Endings: None Reported    Modes of Intervention: Education      Discipline Responsible: [address], [place]      Signature:  Sue Myrick

## 2020-01-15 NOTE — CARE COORDINATION
FAMILY COLLATERAL NOTE    Family/Support Name: Laury Manrique #: [phone]  Relationship to Pt: father      Placed call to above to update on discharge plans and tx. Discussed that the doctor would speak to patient regarding compliance with treatment and what may be recommended if patient does not follow through with treatment. Father was in agreement. Discussed [facility] program at [facility]. Father stated that this program would be helpful but patient may not agree to go to [facility] since they assessed him to come to the hospital. Will update father tomorrow after patient speaks to the doctor. Discharge tentatively is planned for Friday.   Electronically signed by Jenkins Moritz, [address] on 1/15/2020 at 11:21 AM             Jenkins Moritz, [address]

## 2020-01-15 NOTE — GROUP NOTE
Group Therapy Note    Date: 1/15/2020    Group Start Time: 1100  Group End Time: 1200  Group Topic: Psychotherapy    [facility]    Berlinda Shone, LISW-S        Group Therapy Note    Attendees: 9    Patients Goal: Patient will engage in group process and increase appropriate social interaction    Notes: Patient effectively used group time to achieve stated goal    Status After Intervention:  Unchanged    Participation Level: Interactive    Participation Quality: Sharing      Speech:  normal      Thought Process/Content: Logical      Affective Functioning: Congruent      Mood: dysphoric      Level of consciousness:  Alert      Response to Learning: Able to verbalize current knowledge/experience      Endings: None Reported    Modes of Intervention: Support      Discipline Responsible: /Counselor      Signature:  Berlinda Shone, LISW-S

## 2020-01-15 NOTE — GROUP NOTE
Group Therapy Note    Date: 1/15/2020    Group Start Time: 1000  Group End Time: 1100  Group Topic: Psychoeducation    [facility]    Kaitlynn Rivera        Group Therapy Note    Attendees: 14         Patient's Goal:  \"Avoid negative things\"    Notes:  Patient was preoccupied and makes negative remarks at times but he work adequately on his task and did stay longer in group.     Status After Intervention:  Unchanged    Participation Level: Minimal    Participation Quality: Appropriate      Speech:  talkative      Thought Process/Content: Linear      Affective Functioning: Flat      Mood: calmer      Level of consciousness:  Preoccupied      Response to Learning: Progressing to goal      Endings: None Reported    Modes of Intervention: Education, Socialization and Activity      Discipline Responsible: Psychoeducational Specialist      Signature:  Momo Gutierrez

## 2020-01-15 NOTE — PROGRESS NOTES
Results   Component Value Date    VALPROATE 60.1 01/14/2020       RISK ASSESSMENT: still psychotic and manic    Treatment Plan:  Reviewed current Medications with the patient. Medication as ordered  Booster on Friday  First program staff from [facility] coming on friday  Recommend parents to apply for guardianship  Risks, benefits, side effects, drug-to-drug interactions and alternatives to treatment were discussed. Collateral information: reviewed  CD evaluation  Encourage patient to attend group and other milieu activities.   Discharge planning discussed with the patient and treatment team.    PSYCHOTHERAPY/COUNSELING:  [x] Therapeutic interview  [x] Supportive  [] CBT  [] Ongoing  [] Other    [x] Patient continues to need, on a daily basis, active treatment furnished directly by or requiring the supervision of inpatient psychiatric personnel      Anticipated Length of stay:            Electronically signed by Dariusz Antunez MD on 1/15/2020 at 5:02 PM

## 2020-01-16 PROCEDURE — 1240000000 HC EMOTIONAL WELLNESS R&B

## 2020-01-16 PROCEDURE — 6370000000 HC RX 637 (ALT 250 FOR IP): Performed by: HOSPITALIST

## 2020-01-16 PROCEDURE — 99232 SBSQ HOSP IP/OBS MODERATE 35: CPT | Performed by: PSYCHIATRY & NEUROLOGY

## 2020-01-16 PROCEDURE — 6370000000 HC RX 637 (ALT 250 FOR IP): Performed by: PSYCHIATRY & NEUROLOGY

## 2020-01-16 RX ADMIN — NICOTINE POLACRILEX 2 MG: 2 GUM, CHEWING BUCCAL at 10:23

## 2020-01-16 RX ADMIN — HYDROXYZINE PAMOATE 50 MG: 50 CAPSULE ORAL at 21:37

## 2020-01-16 RX ADMIN — DIVALPROEX SODIUM 250 MG: 250 TABLET, DELAYED RELEASE ORAL at 06:20

## 2020-01-16 RX ADMIN — DIVALPROEX SODIUM 250 MG: 250 TABLET, DELAYED RELEASE ORAL at 21:37

## 2020-01-16 RX ADMIN — HYDROXYZINE PAMOATE 50 MG: 50 CAPSULE ORAL at 11:49

## 2020-01-16 RX ADMIN — NICOTINE POLACRILEX 2 MG: 2 GUM, CHEWING BUCCAL at 14:31

## 2020-01-16 RX ADMIN — DIVALPROEX SODIUM 250 MG: 250 TABLET, DELAYED RELEASE ORAL at 14:30

## 2020-01-16 RX ADMIN — PALIPERIDONE 6 MG: 6 TABLET, EXTENDED RELEASE ORAL at 10:23

## 2020-01-16 NOTE — PROGRESS NOTES
Medications with the patient. Medication as ordered  Booster on Friday  First program staff from [facility] coming on friday  Risks, benefits, side effects, drug-to-drug interactions and alternatives to treatment were discussed. Collateral information: reviewed  CD evaluation  Encourage patient to attend group and other milieu activities.   Discharge planning discussed with the patient and treatment team.    PSYCHOTHERAPY/COUNSELING:  [x] Therapeutic interview  [x] Supportive  [] CBT  [] Ongoing  [] Other    [x] Patient continues to need, on a daily basis, active treatment furnished directly by or requiring the supervision of inpatient psychiatric personnel      Anticipated Length of stay:            Electronically signed by Emeka Duncan MD on 1/16/2020 at 11:10 AM

## 2020-01-16 NOTE — GROUP NOTE
Group Therapy Note    Date: 1/16/2020    Group Start Time: 1000  Group End Time: 1100  Group Topic: Psychoeducation    [facility]    Adam Lund        Group Therapy Note    Attendees: 11         Patient's Goal:  \"Work on [employer]"    Notes:  Patient was talkative and sociable in group,  He work fairly on his task. Status After Intervention:  Improved    Participation Level:  Active Listener    Participation Quality: Appropriate      Speech:  talkative      Thought Process/Content: Linear      Affective Functioning: Flat      Mood: calmer      Level of consciousness:  Alert      Response to Learning: Progressing to goal      Endings: None Reported    Modes of Intervention: Education, Socialization and Activity      Discipline Responsible: Psychoeducational Specialist      Signature:  Adam Lund

## 2020-01-16 NOTE — GROUP NOTE
Group Therapy Note    Date: 1/15/2020    Group Start Time: 1900  Group End Time: 1950  Group Topic: Recreational    [facility]    Stephany Skinner        Group Therapy Note    Attendees: 10         Patient's Goal:  To participate in group activity. Notes:  Patient played game with the group.     Status After Intervention:  Unchanged    Participation Level: Interactive    Participation Quality: Attentive      Speech:  normal      Thought Process/Content: Logical      Affective Functioning: Flat      Mood: elevated      Level of consciousness:  Alert and Attentive      Response to Learning: Progressing to goal      Endings: None Reported    Modes of Intervention: Activity      Discipline Responsible: [address], [employer]      Signature:  Stephany Skinner
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
## 2020-01-16 NOTE — PROGRESS NOTES
Patient attended 2100 wrap up group. Electronically signed by Lalita Franklin on 1/15/2020 at 9:26 PM

## 2020-01-17 VITALS
SYSTOLIC BLOOD PRESSURE: 124 MMHG | RESPIRATION RATE: 16 BRPM | TEMPERATURE: 97 F | DIASTOLIC BLOOD PRESSURE: 84 MMHG | HEIGHT: 67 IN | BODY MASS INDEX: 20.4 KG/M2 | WEIGHT: 130 LBS | OXYGEN SATURATION: 98 % | HEART RATE: 109 BPM

## 2020-01-17 PROCEDURE — 6370000000 HC RX 637 (ALT 250 FOR IP): Performed by: HOSPITALIST

## 2020-01-17 PROCEDURE — 6370000000 HC RX 637 (ALT 250 FOR IP): Performed by: PSYCHIATRY & NEUROLOGY

## 2020-01-17 PROCEDURE — 99239 HOSP IP/OBS DSCHRG MGMT >30: CPT | Performed by: PSYCHIATRY & NEUROLOGY

## 2020-01-17 RX ORDER — PALIPERIDONE 6 MG/1
6 TABLET, EXTENDED RELEASE ORAL DAILY
Qty: 15 TABLET | Refills: 0 | Status: ON HOLD | OUTPATIENT
Start: 2020-01-18 | End: 2021-03-05 | Stop reason: HOSPADM

## 2020-01-17 RX ORDER — DIVALPROEX SODIUM 250 MG/1
TABLET, DELAYED RELEASE ORAL
Qty: 45 TABLET | Refills: 3 | Status: ON HOLD | OUTPATIENT
Start: 2020-01-17 | End: 2021-03-05 | Stop reason: HOSPADM

## 2020-01-17 RX ADMIN — NICOTINE POLACRILEX 2 MG: 2 GUM, CHEWING BUCCAL at 09:34

## 2020-01-17 RX ADMIN — PALIPERIDONE 6 MG: 6 TABLET, EXTENDED RELEASE ORAL at 09:29

## 2020-01-17 RX ADMIN — DIVALPROEX SODIUM 250 MG: 250 TABLET, DELAYED RELEASE ORAL at 06:15

## 2020-01-17 NOTE — GROUP NOTE
Group Therapy Note    Date: 1/16/2020    Group Start Time: 1900  Group End Time: 1950  Group Topic: Recreational    [facility] RONNIE Mina        Group Therapy Note    Attendees: 13         Patient's Goal:  To participate in group recreational activity. Notes:  Patient engaged with group and participated in today's team activity.     Status After Intervention:  Unchanged    Participation Level: Monopolizing    Participation Quality: Attentive and Resistant      Speech:  normal      Thought Process/Content: Logical      Affective Functioning: Flat      Mood: irritable      Level of consciousness:  Alert      Response to Learning: Resistant      Endings: None Reported    Modes of Intervention: Activity      Discipline Responsible: Behavorial Health Tech      Signature:  Joaquina Mina

## 2020-01-17 NOTE — DISCHARGE SUMMARY
resource strain: Not on file    Food insecurity:     Worry: Not on file     Inability: Not on file    Transportation needs:     Medical: Not on file     Non-medical: Not on file   Tobacco Use    Smoking status: Not on file   Substance and Sexual Activity    Alcohol use: Not on file    Drug use: Not on file    Sexual activity: Not on file   Lifestyle    Physical activity:     Days per week: Not on file     Minutes per session: Not on file    Stress: Not on file   Relationships    Social connections:     Talks on phone: Not on file     Gets together: Not on file     Attends Protestant service: Not on file     Active member of club or organization: Not on file     Attends meetings of clubs or organizations: Not on file     Relationship status: Not on file    Intimate partner violence:     Fear of current or ex partner: Not on file     Emotionally abused: Not on file     Physically abused: Not on file     Forced sexual activity: Not on file   Other Topics Concern    Not on file   Social History Narrative    Not on file       MEDICATIONS:    Current Facility-Administered Medications:     [COMPLETED] paliperidone palmitate ER (INVEGA SUSTENNA) IM injection 234 mg, 234 mg, Intramuscular, Once, 234 mg at 01/10/20 1917 **FOLLOWED BY** paliperidone palmitate ER (INVEGA SUSTENNA) IM injection 156 mg, 156 mg, Intramuscular, Once **FOLLOWED BY** [START ON 2/7/2020] paliperidone palmitate ER (INVEGA SUSTENNA) IM injection 117 mg, 117 mg, Intramuscular, Q30 Days, Stefanie Davey MD    paliperidone (INVEGA) extended release tablet 6 mg, 6 mg, Oral, Daily, Stefanie Davey MD, 6 mg at 01/17/20 0929    divalproex (DEPAKOTE) DR tablet 250 mg, 250 mg, Oral, 3 times per day, Stefanie Davey MD, 250 mg at 01/17/20 0615    acetaminophen (TYLENOL) tablet 650 mg, 650 mg, Oral, Q4H PRN, Stefanie Davey MD    magnesium hydroxide (MILK OF MAGNESIA) 400 MG/5ML suspension 30 mL, 30 mL, Oral, Daily PRN, Stefanie Davey well-appearing  Behavior/Motor:  no abnormalities noted  Attitude toward examiner:  attentive and good eye contact  Speech:  spontaneous, normal rate and normal volume   Mood: anxious  Affect:  mood congruent  Thought processes:  slow   Thought content:  Suicidal Ideation:  denies suicidal ideation  Cognition:  oriented to person, place, and time   Concentration intact  Memory intact  Insight good   Judgement fair   Fund of Knowledge adequate      ASSESSMENT:  Patient symptoms are:  [] Well controlled  [x] Improving  [] Worsening  [] No change      Diagnosis:  Active Problems:    Schizoaffective disorder, bipolar type ([facility] [address]    Marijuana use, continuous  Resolved Problems:    * No resolved hospital problems. *      LABS:    No results for input(s): WBC, HGB, PLT in the last 72 hours. No results for input(s): NA, K, CL, CO2, BUN, CREATININE, GLUCOSE in the last 72 hours. No results for input(s): BILITOT, ALKPHOS, AST, ALT in the last 72 hours. Lab Results   Component Value Date    LABAMPH Neg 01/07/2020    BARBSCNU Neg 01/07/2020    LABBENZ Neg 01/07/2020    LABMETH Neg 01/07/2020    OPIATESCREENURINE Neg 01/07/2020    PHENCYCLIDINESCREENURINE Neg 01/07/2020    ETOH <10 01/07/2020     Lab Results   Component Value Date    TSH 1.920 01/07/2020     No results found for: LITHIUM  Lab Results   Component Value Date    VALPROATE 60.1 01/14/2020       RISK ASSESSMENT AT DISCHARGE: Low risk for suicide and homicide. Treatment Plan:  Reviewed current Medications with the patient. Education provided on the complaince with treatment. First program at [facility] recommended      Risks, benefits, side effects, drug-to-drug interactions and alternatives to treatment were discussed. Encourage patient to attend outpatient follow up appointment and therapy. Patient was advised to call the outpatient provider, visit the nearest ED or call 911 if symptoms are not manageable.      Patient's family member was contacted prior to the discharge.         Medication List      START taking these medications    divalproex 250 MG DR tablet  Commonly known as:  DEPAKOTE  1 tab qam and 2 tab qhs     paliperidone 6 MG extended release tablet  Commonly known as:  INVEGA  Take 1 tablet by mouth daily  Start taking on:  January 18, 2020     paliperidone palmitate  MG/0.75ML Valencia IM injection  Commonly known as:  INVEGA SUSTENNA  Inject 117 mg into the muscle every 30 days  Start taking on:  February 7, 2020        STOP taking these medications    risperiDONE 0.5 MG tablet  Commonly known as:  RISPERDAL           Where to Get Your Medications      These medications were sent to [facility] [address] - [address] [address]    Phone:  [phone]   · divalproex 250 MG DR tablet  · paliperidone 6 MG extended release tablet     You can get these medications from any pharmacy    Bring a paper prescription for each of these medications  · paliperidone palmitate  MG/0.75ML Valencia IM injection           TIME SPEND - 35 MINUTES TO COMPLETE THE EVALUATION, DISCHARGE SUMMARY, MEDICATION RECONCILIATION AND FOLLOW UP CARE     Tracie Means  1/17/2020  9:49 AM

## 2020-01-17 NOTE — PROGRESS NOTES
Patient did not attend Wrap-Up Group despite staff encouragement.  Electronically signed by Libra Garcia on 1/16/2020 at 9:21 PM

## 2020-01-17 NOTE — DISCHARGE SUMMARY
Pt given all discharge information and instructions and able to verbalize an understanding of same. Pt signed all discharge documentation. Pt denies any suicidal or homicidal ideations or hallucinations. Home belongings returned and given food/drug interaction guide. Rx escripted to [employer]. Pt parents arrived to transport home.      Electronically signed by Ronan Fung RN on 1/17/2020 at 2:32 PM

## 2021-02-24 ENCOUNTER — HOSPITAL ENCOUNTER (INPATIENT)
Age: 26
LOS: 8 days | Discharge: HOME | DRG: 885 | End: 2021-03-05
Attending: PSYCHIATRY & NEUROLOGY | Admitting: PSYCHIATRY & NEUROLOGY
Payer: COMMERCIAL

## 2021-02-24 DIAGNOSIS — F25.0 SCHIZOAFFECTIVE DISORDER, BIPOLAR TYPE (HCC): Primary | ICD-10-CM

## 2021-02-24 DIAGNOSIS — F51.01 PRIMARY INSOMNIA: ICD-10-CM

## 2021-02-24 LAB
ACETAMINOPHEN LEVEL: <5 UG/ML (ref 10–30)
ALBUMIN SERPL-MCNC: 4.4 G/DL (ref 3.5–4.6)
ALP BLD-CCNC: 84 U/L (ref 35–104)
ALT SERPL-CCNC: 10 U/L (ref 0–41)
AMPHETAMINE SCREEN, URINE: ABNORMAL
ANION GAP SERPL CALCULATED.3IONS-SCNC: 9 MEQ/L (ref 9–15)
AST SERPL-CCNC: 9 U/L (ref 0–40)
BARBITURATE SCREEN URINE: ABNORMAL
BASOPHILS ABSOLUTE: 0.1 K/UL (ref 0–0.2)
BASOPHILS RELATIVE PERCENT: 0.7 %
BENZODIAZEPINE SCREEN, URINE: ABNORMAL
BILIRUB SERPL-MCNC: 0.9 MG/DL (ref 0.2–0.7)
BILIRUBIN URINE: NEGATIVE
BLOOD, URINE: NEGATIVE
BUN BLDV-MCNC: 15 MG/DL (ref 6–20)
CALCIUM SERPL-MCNC: 9.4 MG/DL (ref 8.5–9.9)
CANNABINOID SCREEN URINE: POSITIVE
CHLORIDE BLD-SCNC: 101 MEQ/L (ref 95–107)
CHOLESTEROL, TOTAL: 144 MG/DL (ref 0–199)
CK MB: <1 NG/ML (ref 0–6.7)
CLARITY: CLEAR
CO2: 27 MEQ/L (ref 20–31)
COCAINE METABOLITE SCREEN URINE: ABNORMAL
COLOR: YELLOW
CREAT SERPL-MCNC: 0.82 MG/DL (ref 0.7–1.2)
CREATINE KINASE-MB INDEX: NORMAL % (ref 0–3.5)
EOSINOPHILS ABSOLUTE: 0.2 K/UL (ref 0–0.7)
EOSINOPHILS RELATIVE PERCENT: 2.8 %
ETHANOL PERCENT: NORMAL G/DL
ETHANOL: <10 MG/DL (ref 0–0.08)
GFR AFRICAN AMERICAN: >60
GFR NON-AFRICAN AMERICAN: >60
GLOBULIN: 2.8 G/DL (ref 2.3–3.5)
GLUCOSE BLD-MCNC: 113 MG/DL (ref 70–99)
GLUCOSE URINE: NEGATIVE MG/DL
HCT VFR BLD CALC: 46.2 % (ref 42–52)
HDLC SERPL-MCNC: 46 MG/DL (ref 40–59)
HEMOGLOBIN: 15.3 G/DL (ref 14–18)
KETONES, URINE: NEGATIVE MG/DL
LDL CHOLESTEROL CALCULATED: 88 MG/DL (ref 0–129)
LEUKOCYTE ESTERASE, URINE: NEGATIVE
LYMPHOCYTES ABSOLUTE: 1.4 K/UL (ref 1–4.8)
LYMPHOCYTES RELATIVE PERCENT: 17.8 %
Lab: ABNORMAL
MCH RBC QN AUTO: 31.6 PG (ref 27–31.3)
MCHC RBC AUTO-ENTMCNC: 33.2 % (ref 33–37)
MCV RBC AUTO: 95.4 FL (ref 80–100)
METHADONE SCREEN, URINE: ABNORMAL
MONOCYTES ABSOLUTE: 0.7 K/UL (ref 0.2–0.8)
MONOCYTES RELATIVE PERCENT: 8.5 %
NEUTROPHILS ABSOLUTE: 5.4 K/UL (ref 1.4–6.5)
NEUTROPHILS RELATIVE PERCENT: 70.2 %
NITRITE, URINE: NEGATIVE
OPIATE SCREEN URINE: ABNORMAL
OXYCODONE URINE: ABNORMAL
PDW BLD-RTO: 13.2 % (ref 11.5–14.5)
PH UA: 5.5 (ref 5–9)
PHENCYCLIDINE SCREEN URINE: ABNORMAL
PLATELET # BLD: 202 K/UL (ref 130–400)
POTASSIUM SERPL-SCNC: 4 MEQ/L (ref 3.4–4.9)
PROPOXYPHENE SCREEN: ABNORMAL
PROTEIN UA: NEGATIVE MG/DL
RBC # BLD: 4.84 M/UL (ref 4.7–6.1)
SALICYLATE, SERUM: <0.3 MG/DL (ref 15–30)
SODIUM BLD-SCNC: 137 MEQ/L (ref 135–144)
SPECIFIC GRAVITY UA: 1.03 (ref 1–1.03)
TOTAL CK: 53 U/L (ref 0–190)
TOTAL PROTEIN: 7.2 G/DL (ref 6.3–8)
TRIGL SERPL-MCNC: 49 MG/DL (ref 0–150)
TSH SERPL DL<=0.05 MIU/L-ACNC: 1.97 UIU/ML (ref 0.44–3.86)
URINE REFLEX TO CULTURE: NORMAL
UROBILINOGEN, URINE: 1 E.U./DL
VALPROIC ACID LEVEL: <2.8 UG/ML (ref 50–100)
WBC # BLD: 7.8 K/UL (ref 4.8–10.8)

## 2021-02-24 PROCEDURE — 80164 ASSAY DIPROPYLACETIC ACD TOT: CPT

## 2021-02-24 PROCEDURE — 81003 URINALYSIS AUTO W/O SCOPE: CPT

## 2021-02-24 PROCEDURE — 80179 DRUG ASSAY SALICYLATE: CPT

## 2021-02-24 PROCEDURE — 80143 DRUG ASSAY ACETAMINOPHEN: CPT

## 2021-02-24 PROCEDURE — 80061 LIPID PANEL: CPT

## 2021-02-24 PROCEDURE — 6370000000 HC RX 637 (ALT 250 FOR IP): Performed by: PERSONAL EMERGENCY RESPONSE ATTENDANT

## 2021-02-24 PROCEDURE — 82077 ASSAY SPEC XCP UR&BREATH IA: CPT

## 2021-02-24 PROCEDURE — 36415 COLL VENOUS BLD VENIPUNCTURE: CPT

## 2021-02-24 PROCEDURE — 82550 ASSAY OF CK (CPK): CPT

## 2021-02-24 PROCEDURE — 80307 DRUG TEST PRSMV CHEM ANLYZR: CPT

## 2021-02-24 PROCEDURE — 85025 COMPLETE CBC W/AUTO DIFF WBC: CPT

## 2021-02-24 PROCEDURE — 99285 EMERGENCY DEPT VISIT HI MDM: CPT

## 2021-02-24 PROCEDURE — 84443 ASSAY THYROID STIM HORMONE: CPT

## 2021-02-24 PROCEDURE — 80053 COMPREHEN METABOLIC PANEL: CPT

## 2021-02-24 PROCEDURE — 82553 CREATINE MB FRACTION: CPT

## 2021-02-24 RX ORDER — DIPHENHYDRAMINE HCL 50 MG
50 CAPSULE ORAL ONCE
Status: COMPLETED | OUTPATIENT
Start: 2021-02-24 | End: 2021-02-24

## 2021-02-24 RX ORDER — TRAZODONE HYDROCHLORIDE 50 MG/1
50 TABLET ORAL NIGHTLY PRN
Status: CANCELLED | OUTPATIENT
Start: 2021-02-25

## 2021-02-24 RX ORDER — ACETAMINOPHEN 325 MG/1
650 TABLET ORAL EVERY 4 HOURS PRN
Status: CANCELLED | OUTPATIENT
Start: 2021-02-24

## 2021-02-24 RX ORDER — HYDROXYZINE PAMOATE 50 MG/1
50 CAPSULE ORAL EVERY 6 HOURS PRN
Status: CANCELLED | OUTPATIENT
Start: 2021-02-24

## 2021-02-24 RX ORDER — BENZTROPINE MESYLATE 1 MG/ML
2 INJECTION INTRAMUSCULAR; INTRAVENOUS 2 TIMES DAILY PRN
Status: CANCELLED | OUTPATIENT
Start: 2021-02-24

## 2021-02-24 RX ORDER — HALOPERIDOL 10 MG/1
10 TABLET ORAL ONCE
Status: COMPLETED | OUTPATIENT
Start: 2021-02-24 | End: 2021-02-24

## 2021-02-24 RX ORDER — HALOPERIDOL 5 MG
5 TABLET ORAL EVERY 6 HOURS PRN
Status: CANCELLED | OUTPATIENT
Start: 2021-02-24

## 2021-02-24 RX ORDER — POLYETHYLENE GLYCOL 3350 17 G
2 POWDER IN PACKET (EA) ORAL
Status: CANCELLED | OUTPATIENT
Start: 2021-02-24

## 2021-02-24 RX ORDER — LORAZEPAM 1 MG/1
2 TABLET ORAL ONCE
Status: COMPLETED | OUTPATIENT
Start: 2021-02-24 | End: 2021-02-24

## 2021-02-24 RX ORDER — HYDROXYZINE HYDROCHLORIDE 50 MG/ML
50 INJECTION, SOLUTION INTRAMUSCULAR EVERY 6 HOURS PRN
Status: CANCELLED | OUTPATIENT
Start: 2021-02-24

## 2021-02-24 RX ORDER — HALOPERIDOL 5 MG/ML
5 INJECTION INTRAMUSCULAR EVERY 6 HOURS PRN
Status: CANCELLED | OUTPATIENT
Start: 2021-02-24

## 2021-02-24 RX ADMIN — HALOPERIDOL 10 MG: 10 TABLET ORAL at 23:20

## 2021-02-24 RX ADMIN — LORAZEPAM 2 MG: 1 TABLET ORAL at 23:20

## 2021-02-24 RX ADMIN — DIPHENHYDRAMINE HYDROCHLORIDE 50 MG: 50 CAPSULE ORAL at 23:20

## 2021-02-24 ASSESSMENT — ENCOUNTER SYMPTOMS
SHORTNESS OF BREATH: 0
TROUBLE SWALLOWING: 0
ABDOMINAL PAIN: 0
APNEA: 0
COLOR CHANGE: 0
EYE PAIN: 0
ALLERGIC/IMMUNOLOGIC NEGATIVE: 1

## 2021-02-24 NOTE — ED NOTES
Pt taken to [facility] unit, Pt gave permission to give his parents updates on his status.      Jaqueline Ruelas RN  02/24/21 4997

## 2021-02-24 NOTE — ED PROVIDER NOTES
[facility] ED  EMERGENCY DEPARTMENT ENCOUNTER      Pt Name: Michael Stark  MRN: [id number]  [place] 1995  Date of evaluation: 2/24/2021  Provider: Jules Shaffer PA-C    CHIEF COMPLAINT       Chief Complaint   Patient presents with    Psychiatric Evaluation     pt was brought to the ER by his parents for an eval         HISTORY OF PRESENT ILLNESS   (Location/Symptom, Timing/Onset, Context/Setting, Quality, Duration, Modifying Factors, Severity)  Note limiting factors. Michael Stark is a 32 y.o. male who presents to the emergency department for psychiatric evaluation. Patient was brought to the emergency department by parents due to inability to care for patient. Per report, the patient has been noncompliant with his medicine for the past several months and his delusions and paranoia have been progressively worsening. .  Patient is actively paranoid and delusional, speaking tangentially. Patient denies any suicidal ideation however when asked if he has any homicidal ideation, the patient states I would only kill myself. Patient states that he is afraid to go to sleep and would cut his own wrists to try to stay awake. Patient states that he believes that doctors are out to trick him. She denies any recent illness or injuries otherwise    HPI    Nursing Notes were reviewed. REVIEW OF SYSTEMS    (2-9 systems for level 4, 10 or more for level 5)     Review of Systems   Constitutional: Negative for diaphoresis and fever. HENT: Negative for hearing loss and trouble swallowing. Eyes: Negative for pain. Respiratory: Negative for apnea and shortness of breath. Cardiovascular: Negative for chest pain. Gastrointestinal: Negative for abdominal pain. Endocrine: Negative. Genitourinary: Negative for hematuria. Musculoskeletal: Negative for neck pain and neck stiffness. Skin: Negative for color change. Allergic/Immunologic: Negative. Neurological: Negative for dizziness and numbness. Fear of current or ex partner: None     Emotionally abused: None     Physically abused: None     Forced sexual activity: None   Other Topics Concern    None   Social History Narrative    None       SCREENINGS                        PHYSICAL EXAM    (up to 7 for level 4, 8 or more for level 5)     ED Triage Vitals [02/24/21 1816]   BP Temp Temp Source Pulse Resp SpO2 Height Weight   130/76 98.3 °F (36.8 °C) Oral 75 16 99 % 5' 9\" (1.753 m) 140 lb (63.5 kg)       Physical Exam  Vitals signs and nursing note reviewed. Constitutional:       General: He is not in acute distress. Appearance: He is well-developed. He is not diaphoretic. HENT:      Head: Normocephalic and atraumatic. Mouth/Throat:      Pharynx: No oropharyngeal exudate. Eyes:      General: No scleral icterus. Conjunctiva/sclera: Conjunctivae normal.      Pupils: Pupils are equal, round, and reactive to light. Neck:      Musculoskeletal: Normal range of motion and neck supple. Trachea: No tracheal deviation. Cardiovascular:      Rate and Rhythm: Normal rate. Heart sounds: Normal heart sounds. Pulmonary:      Effort: Pulmonary effort is normal. No respiratory distress. Breath sounds: Normal breath sounds. Abdominal:      General: Bowel sounds are normal. There is no distension. Palpations: Abdomen is soft. Musculoskeletal: Normal range of motion. Skin:     General: Skin is warm and dry. Findings: No erythema or rash. Neurological:      Mental Status: He is alert. Cranial Nerves: No cranial nerve deficit. Motor: No abnormal muscle tone. Psychiatric:         Speech: Speech is tangential.         Behavior: Behavior is hyperactive. Thought Content: Thought content is paranoid and delusional. Thought content does not include homicidal ideation. Judgment: Judgment is impulsive.          DIAGNOSTIC RESULTS EKG: All EKG's are interpreted by the Emergency Department Physician who either signs or Co-signs this chart in the absence of a cardiologist.        RADIOLOGY:   Non-plain film images such as CT, Ultrasound and MRI are read by the radiologist. Plain radiographic images are visualized and preliminarily interpreted by the emergency physician with the below findings:      Interpretation per the Radiologist below, if available at the time of this note:    No orders to display         ED BEDSIDE ULTRASOUND:   Performed by ED Physician - none    LABS:  Labs Reviewed   ACETAMINOPHEN LEVEL - Abnormal; Notable for the following components:       Result Value    Acetaminophen Level <5 (*)     All other components within normal limits   CBC WITH AUTO DIFFERENTIAL - Abnormal; Notable for the following components:    MCH 31.6 (*)     All other components within normal limits   COMPREHENSIVE METABOLIC PANEL - Abnormal; Notable for the following components:    Glucose 113 (*)     Total Bilirubin 0.9 (*)     All other components within normal limits   SALICYLATE LEVEL - Abnormal; Notable for the following components:    Salicylate, Serum <3.1 (*)     All other components within normal limits   URINE DRUG SCREEN - Abnormal; Notable for the following components:    Cannabinoid Scrn, Ur POSITIVE (*)     All other components within normal limits   VALPROIC ACID LEVEL, TOTAL - Abnormal; Notable for the following components:    Valproic Acid Lvl <2.8 (*)     All other components within normal limits   CK-MB INDEX   ETHANOL   LIPID PANEL   TSH WITHOUT REFLEX   URINE RT REFLEX TO CULTURE       All other labs were within normal range or not returned as of this dictation.     EMERGENCY DEPARTMENT COURSE and DIFFERENTIAL DIAGNOSIS/MDM:   Vitals:    Vitals:    02/24/21 1816 02/24/21 2100 02/24/21 2356   BP: 130/76 132/72    Pulse: 75 76    Resp: 16 16    Temp: 98.3 °F (36.8 °C) 98.5 °F (36.9 °C)    TempSrc: Oral Oral    SpO2: 99% (!) 16% 96% Weight: 140 lb (63.5 kg)     Height: 5' 9\" (1.753 m)           MDM      REASSESSMENT         Patient is medically cleared for psychiatric evaluation and care          Was seen, evaluated and will be admitted by psychiatry    CONSULTS:  IP CONSULT TO HOSPITALIST    PROCEDURES:  Unless otherwise noted below, none     Procedures        FINAL IMPRESSION      1. Schizoaffective disorder, bipolar type [facility])          DISPOSITION/PLAN   DISPOSITION Decision To Admit 02/24/2021 09:54:05 PM      PATIENT REFERRED TO:  No follow-up provider specified. DISCHARGE MEDICATIONS:  New Prescriptions    No medications on file     Controlled Substances Monitoring:     No flowsheet data found.     (Please note that portions of this note were completed with a voice recognition program.  Efforts were made to edit the dictations but occasionally words are mis-transcribed.)    Feliberto Mares PA-C (electronically signed)  Attending Emergency Physician            Feliberto Mares PA-C  02/24/21 9132       Feliberto Mares PA-C  02/25/21 9744

## 2021-02-24 NOTE — ED TRIAGE NOTES
Patient presents with rambling speech. Thoughts are disorganized. Constantly talking to self. States, \"I will literally kill myself if I have to come back here, that is why I don't sleep\".

## 2021-02-24 NOTE — ED NOTES
Chart to ER [facility] Medical staff. Lab notified of need for blood draw.      Annis Schilder, RN  02/24/21 7082

## 2021-02-25 LAB
EKG ATRIAL RATE: 68 BPM
EKG P AXIS: 12 DEGREES
EKG P-R INTERVAL: 140 MS
EKG Q-T INTERVAL: 444 MS
EKG QRS DURATION: 94 MS
EKG QTC CALCULATION (BAZETT): 472 MS
EKG R AXIS: 59 DEGREES
EKG T AXIS: 48 DEGREES
EKG VENTRICULAR RATE: 68 BPM
SARS-COV-2, NAAT: NOT DETECTED

## 2021-02-25 PROCEDURE — 93005 ELECTROCARDIOGRAM TRACING: CPT | Performed by: PSYCHIATRY & NEUROLOGY

## 2021-02-25 PROCEDURE — 87635 SARS-COV-2 COVID-19 AMP PRB: CPT

## 2021-02-25 PROCEDURE — 6370000000 HC RX 637 (ALT 250 FOR IP): Performed by: PSYCHIATRY & NEUROLOGY

## 2021-02-25 PROCEDURE — 1240000000 HC EMOTIONAL WELLNESS R&B

## 2021-02-25 PROCEDURE — 6370000000 HC RX 637 (ALT 250 FOR IP): Performed by: EMERGENCY MEDICINE

## 2021-02-25 RX ORDER — TRAZODONE HYDROCHLORIDE 50 MG/1
50 TABLET ORAL NIGHTLY PRN
Status: DISCONTINUED | OUTPATIENT
Start: 2021-02-25 | End: 2021-02-26

## 2021-02-25 RX ORDER — LORAZEPAM 1 MG/1
1 TABLET ORAL EVERY 6 HOURS PRN
Status: DISCONTINUED | OUTPATIENT
Start: 2021-02-25 | End: 2021-03-05 | Stop reason: HOSPADM

## 2021-02-25 RX ORDER — HYDROXYZINE PAMOATE 50 MG/1
50 CAPSULE ORAL EVERY 6 HOURS PRN
Status: DISCONTINUED | OUTPATIENT
Start: 2021-02-25 | End: 2021-03-05 | Stop reason: HOSPADM

## 2021-02-25 RX ORDER — POLYETHYLENE GLYCOL 3350 17 G/17G
17 POWDER, FOR SOLUTION ORAL DAILY PRN
Status: DISCONTINUED | OUTPATIENT
Start: 2021-02-25 | End: 2021-03-05 | Stop reason: HOSPADM

## 2021-02-25 RX ORDER — NICOTINE 21 MG/24HR
1 PATCH, TRANSDERMAL 24 HOURS TRANSDERMAL DAILY
Status: DISCONTINUED | OUTPATIENT
Start: 2021-02-25 | End: 2021-02-25

## 2021-02-25 RX ORDER — HALOPERIDOL 5 MG/ML
5 INJECTION INTRAMUSCULAR EVERY 6 HOURS PRN
Status: DISCONTINUED | OUTPATIENT
Start: 2021-02-25 | End: 2021-03-05 | Stop reason: HOSPADM

## 2021-02-25 RX ORDER — HYDROXYZINE HYDROCHLORIDE 50 MG/ML
50 INJECTION, SOLUTION INTRAMUSCULAR EVERY 6 HOURS PRN
Status: DISCONTINUED | OUTPATIENT
Start: 2021-02-25 | End: 2021-03-05 | Stop reason: HOSPADM

## 2021-02-25 RX ORDER — LORAZEPAM 2 MG/ML
1 INJECTION INTRAMUSCULAR EVERY 6 HOURS PRN
Status: DISCONTINUED | OUTPATIENT
Start: 2021-02-25 | End: 2021-03-05 | Stop reason: HOSPADM

## 2021-02-25 RX ORDER — LORAZEPAM 1 MG/1
2 TABLET ORAL ONCE
Status: COMPLETED | OUTPATIENT
Start: 2021-02-25 | End: 2021-02-25

## 2021-02-25 RX ORDER — DIPHENHYDRAMINE HCL 25 MG
50 TABLET ORAL ONCE
Status: COMPLETED | OUTPATIENT
Start: 2021-02-25 | End: 2021-02-25

## 2021-02-25 RX ORDER — ACETAMINOPHEN 325 MG/1
650 TABLET ORAL EVERY 4 HOURS PRN
Status: DISCONTINUED | OUTPATIENT
Start: 2021-02-25 | End: 2021-03-05 | Stop reason: HOSPADM

## 2021-02-25 RX ORDER — HALOPERIDOL 10 MG/1
10 TABLET ORAL ONCE
Status: COMPLETED | OUTPATIENT
Start: 2021-02-25 | End: 2021-02-25

## 2021-02-25 RX ORDER — HALOPERIDOL 5 MG
5 TABLET ORAL EVERY 6 HOURS PRN
Status: DISCONTINUED | OUTPATIENT
Start: 2021-02-25 | End: 2021-03-05 | Stop reason: HOSPADM

## 2021-02-25 RX ADMIN — HALOPERIDOL 10 MG: 10 TABLET ORAL at 09:20

## 2021-02-25 RX ADMIN — LORAZEPAM 2 MG: 1 TABLET ORAL at 09:20

## 2021-02-25 RX ADMIN — DIPHENHYDRAMINE HCL 50 MG: 25 TABLET ORAL at 09:20

## 2021-02-25 RX ADMIN — TRAZODONE HYDROCHLORIDE 50 MG: 50 TABLET ORAL at 23:58

## 2021-02-25 RX ADMIN — NICOTINE POLACRILEX 2 MG: 2 GUM, CHEWING BUCCAL at 17:24

## 2021-02-25 ASSESSMENT — SLEEP AND FATIGUE QUESTIONNAIRES
DIFFICULTY ARISING: YES
DO YOU USE A SLEEP AID: NO
AVERAGE NUMBER OF SLEEP HOURS: 3
DIFFICULTY STAYING ASLEEP: YES
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
## 2021-02-25 NOTE — ED NOTES
Per registration pt is no longer on his father's insurance and his mother's insurance will not start for the pt until 3/2021  Pt has no insurance per registration.   Advised evening staff of pt having no insurance currently until 3/2021     Rukhsana Elizabeth RN  02/24/21 4890

## 2021-02-25 NOTE — PROGRESS NOTES
Patient did not attend group despite staff encouragement.   Electronically signed by Tierra Littlejohn on 2/25/2021 at 3:53 PM

## 2021-02-25 NOTE — ED NOTES
Per Dr Bhanu Spivey, admit to 3W due to not having insurance. Assessment sent to [facility] for approval of bed form.      Tc Kelly RN  02/24/21 7909

## 2021-02-25 NOTE — ED NOTES
Per Dr Tena Braun, hold patient in ER due to acting inappropriate. Have Richard dispo in AM. See if patient improves.     NIDA Seth updated on change of dispo     Jason Hurt RN  02/25/21 0004       Jason Hurt RN  02/25/21 0026
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
## 2021-02-25 NOTE — PROGRESS NOTES
Patient did not attend group despite staff encouragement.   Electronically signed by Chau Gipson on 2/25/2021 at [address] PM

## 2021-02-25 NOTE — ED NOTES
Talked with Dr. Prem Sanchez, consulted for the pt's disposition. Advised medications the pt has received, his non compliance with medications, is delusional and paranoid regarding the other peers in the [address] has been cooperative and all med's he received in the ER were orally. DX: Schizoaffective Bipolar. Per Dr. Prem Sanchez admit pt to [facility] and just PRN's. Reviewed with the doctor his medications and injection he has been refusing no home medications are ordered by Dr. Cristiano Kline to [facility] when a bed is available. Called [facility] and no bed will be available until 1300 currently full on the unit. Advised will put orders in on the pt when the bed is available for the pt  Maria Fernanda MCGILL advised calling back after 1300.      Jeromy Borges RN  02/25/21 2087

## 2021-02-25 NOTE — PROGRESS NOTES
Spoke with Parents Ximena and Nick Cruz [phone] after parents gave HIPAA code. Parents think pt is suffering from anxiety and depression, from being around a lot of people this is when they notice pt gets most anxious. Pt started a job and he seems to enjoy it. Parents state job is at [employer] in [place]. Parents state that the job is low paying however, is like a apenticeship where pt is learning a lot. Mom is concerened for him to keep job due to admission and is currently calling off from him. Mother states she is vague when calling off for pt because she feels \"it is not her business to tell them that pt is here for mental health\". Mother states all of past instances from mental health breaks have been in the winter. Parents are wondering if there is some sort of correlation with a seasonal disorder. Mother states she notices pt easily get agitated and anxious when around big groups of people. Parents state they think this has become worse since COVID hit due to isolating to the house. Parents state they were in [place] on Monday and pt called them and said he felt anxious and sad and asked them to come home. Parents states they \"got the first flight home on Tuesday and were home by Tuesday night\". Mom states pt has not been sleeping or eating and that she has been not only trying to encourage him to eat and drink, but also giving different options on food choices. Mom states Deng Yanez has not eaten good in a few days and has lost weight\". Additionally, mom states she has only gotten him to fall asleep by giving pt melatonin and even then pt is only sleeping a \"few hours\". Mother and father state that they are sacred for pt and concerned for his physical and mental wellbeing. Mom states that yesterday she got pt into see his PCP with much encouragement for him to go. Per mom \"PCP immediately suggested that parents take pt to the ED for psychiatric treatment\".  Mom states \"I sternly talked to Leeann and told him that he was having a psychotic break and that the only way it was going to get better was if he went to get help\". Mom states \"it was like a light turned on in his head and he was willing to come to the ED with me\". Parents report being scared that pt will \"hate\" them for helping him to get admitted because the last time that he was inpatient, pt was living with them, however, not talking to them due to being mad about them helping him to get psychiatric help. Mother states she \"just wants him to know that they care, and just wants to get him help\".        Electronically signed by Merline Mormon, RN on 2/25/2021 at 4:56 PM

## 2021-02-25 NOTE — PROGRESS NOTES
Pt visible on unit eating dinner with peers. Pt has been non-compliant with meds since last admission last year. Pt appears flat. Pt is delusional upon assessment, presenting with flight of ideas, and paranoid thoughts of peers. Pt does appear internally stimulated however, Denies SI, HI, AVH. Pt reports increased anxiety and depression r/t being inpatient. Pt has very disorganized thought process, and appears manic with pressured and rapid speech. When asked what brought pt in, pt states \"I do not know I guess I said the wrong things to my mom, she is always on me for something\". When asked to elaborate pt states \"I don't know she takes my blunts\". Pt appears disheveled and extremely thin in appearance. Pt reports \"I can just cure myself with rest and a diet\". When asked about Anxiety and Depression, pt states \"it's just these two girls that I can't get off my mind, even though they have no interest in me\". Pt would not elaborate anymore on these girls and what they mean to him. Pt is currently eating dinner in the dayroom with peers.        Electronically signed by Kamari Liriano RN on 2/25/2021 at 5:23 PM

## 2021-02-25 NOTE — PROGRESS NOTES
Report received from [facility], awaiting COVID test result.       Electronically signed by Andres Hayden RN on 2/25/2021 at 2:09 PM

## 2021-02-25 NOTE — ED NOTES
Pt left with [facility] staff and security in a W/ for his [facility] admit  Pt was cooperative.   All of the pt's belongings were sent with him for his admit to [facility].     Christel Morales RN  02/25/21 6732

## 2021-02-25 NOTE — ED NOTES
Called Dr. Mohan Telles to consult with the doctor for pt's disposition.   Dr. Mohan Telles voice message is full had to leave a SMS message to call the [phone] number when he can call the [address] [place] [address] at [address] [place] [address]  02/25/21 0592

## 2021-02-25 NOTE — ED NOTES
Patient continuing to steven and keshav, request order change to IM medications.      Kenneth Yang RN  02/24/21 0600

## 2021-02-25 NOTE — ED NOTES
Pt accepted the medications given to him and is at the bedside, quiet and cooperative with no problems and no C/O any kind expressed.      Kenji Wesley RN  02/25/21 5521

## 2021-02-25 NOTE — ED NOTES
Pt remains asleep aware of his admit to the floor when the bed is available talked to Paras Alejandre advised would need to call her back as another peer is being transferred and Rhea Husseni is here to pick the pt up.   Will call Surendra Hernandez RN back as soon as possible     Renee Scherer RN  02/25/21 1922

## 2021-02-25 NOTE — ED NOTES
Pt remains asleep with even respiration no problems and no C/O any kind expressed.        Joe Simms RN  02/25/21 3654

## 2021-02-25 NOTE — ED NOTES
Received return call from Thu Doty at [facility]. Notified [facility] will approve indigent bed for patient to be admitted to . Staff to call [facility] when transferred to unit for approval paperwork to be faxed.      Kizzy Lopez RN  02/24/21 7887

## 2021-02-25 NOTE — ED NOTES
Pt is in bed area quiet and cooperative eating his breakfast at the bedside. Pt has even respirations no cough, no SOB, and no respiratory distress noted.      Nisa Osei RN  02/25/21 5743

## 2021-02-25 NOTE — ED NOTES
Per Joana MCGILL a pt has just left but no staff available to take a report currently. The room will need cleaned  Per Joana MCGILL on [facility] she will call back for a report on the pt and a bed.      Consuello Osgood, RN  02/25/21 9908

## 2021-02-25 NOTE — ED NOTES
Called [facility] advised of his arrival and negative COVID test     Gilberto Mcdonald RN  02/25/21 2350

## 2021-02-25 NOTE — ED NOTES
Pt complaining that he is anxious and that he can not sleep and that he \"hates this place and can not relax\". Pt medicated. Pt accepted without difficulty.      Cesar Vicente RN  02/25/21 0479

## 2021-02-25 NOTE — PROGRESS NOTES
Skin and contraband check completed with [facility] RN, Pt oriented to unit. Pt states \"this is a long term cell and I do not want to be here\". Pt currently sitting in room resting, declined having lights turned off.        Electronically signed by Ladonna Alcazar RN on 2/25/2021 at 3:26 PM

## 2021-02-25 NOTE — ED NOTES
Pt's COVID is back and negative  VS were completed on the pt, called security for his belongings and an escort to [facility].  Pt is aware of the need and admit to Andrei Puga RN  02/25/21 2085

## 2021-02-25 NOTE — ED NOTES
Pt is asleep with even respirations no problems and no C/O any kind expressed.      Marylene Coil, RN  02/25/21 5965

## 2021-02-25 NOTE — ED NOTES
Called [facility] and talked with Neetu at [facility].   Neetu states that [facility] does have beds available but limited amount     Gretchen Pichardo RN  02/25/21 2964

## 2021-02-25 NOTE — ED NOTES
Lunch tray offered.  Pt remains sleeping     Shelton Mueller, [facility]  02/25/21 5357

## 2021-02-25 NOTE — ED NOTES
Pt is awake, quiet but talking non stop to himself, paranoid, delusional, flight of ideas, loose associations. Pt is willing to accept medications orally. Called to consult with the doctor but are with medical pt's currently and will call the [phone] number back when available to call back.      Jamaal Mckeon RN  02/25/21 0228

## 2021-02-25 NOTE — ED NOTES
Pt was given water as he requested, advised pt would be calling the doctor as they are going to have D/C and need to consult with the doctor regarding the pt being accepted to the unit after D/C and or [facility]. Pt understood the information given to him.        Blanche Henderson RN  02/25/21 1017

## 2021-02-25 NOTE — ED NOTES
Patient talking about highly  Vulgar and sexual things loudly, swearing ect. Many attempts made to verbally direct patient, 1:1 time given, food and fluids given, and medication given.      Malik Maldonado RN  02/24/21 5309

## 2021-02-26 PROCEDURE — 1240000000 HC EMOTIONAL WELLNESS R&B

## 2021-02-26 PROCEDURE — 6370000000 HC RX 637 (ALT 250 FOR IP): Performed by: PSYCHIATRY & NEUROLOGY

## 2021-02-26 PROCEDURE — 93010 ELECTROCARDIOGRAM REPORT: CPT | Performed by: INTERNAL MEDICINE

## 2021-02-26 PROCEDURE — 99223 1ST HOSP IP/OBS HIGH 75: CPT | Performed by: PSYCHIATRY & NEUROLOGY

## 2021-02-26 RX ORDER — ARIPIPRAZOLE 10 MG/1
10 TABLET ORAL DAILY
Status: DISCONTINUED | OUTPATIENT
Start: 2021-02-26 | End: 2021-02-28

## 2021-02-26 RX ORDER — ZOLPIDEM TARTRATE 5 MG/1
5 TABLET ORAL NIGHTLY
Status: DISCONTINUED | OUTPATIENT
Start: 2021-02-26 | End: 2021-02-27

## 2021-02-26 RX ORDER — DIVALPROEX SODIUM 250 MG/1
250 TABLET, DELAYED RELEASE ORAL EVERY 8 HOURS SCHEDULED
Status: DISCONTINUED | OUTPATIENT
Start: 2021-02-26 | End: 2021-03-05 | Stop reason: HOSPADM

## 2021-02-26 RX ADMIN — DIVALPROEX SODIUM 250 MG: 250 TABLET, DELAYED RELEASE ORAL at 14:09

## 2021-02-26 RX ADMIN — LORAZEPAM 1 MG: 1 TABLET ORAL at 04:15

## 2021-02-26 RX ADMIN — DIVALPROEX SODIUM 250 MG: 250 TABLET, DELAYED RELEASE ORAL at 21:14

## 2021-02-26 RX ADMIN — NICOTINE POLACRILEX 2 MG: 2 GUM, CHEWING BUCCAL at 18:24

## 2021-02-26 RX ADMIN — NICOTINE POLACRILEX 2 MG: 2 GUM, CHEWING BUCCAL at 12:50

## 2021-02-26 RX ADMIN — ZOLPIDEM TARTRATE 5 MG: 5 TABLET ORAL at 21:14

## 2021-02-26 RX ADMIN — ARIPIPRAZOLE 10 MG: 10 TABLET ORAL at 12:06

## 2021-02-26 NOTE — GROUP NOTE
Group Therapy Note    Date: 2/26/2021    Group Start Time: 1100  Group End Time: 1200  Group Topic: Psychoeducation    [facility] JASPER ChandraS        Group Therapy Note    Attendees: 5         Patient's Goal:  \"to stay focused\"    Notes:  Pt. attended the 1100 skill group. Some unusual comments but pleasant and worked on project well. Status After Intervention:  Unchanged    Participation Level:  Active Listener and Interactive    Participation Quality: Appropriate and Attentive      Speech:  normal      Thought Process/Content: Logical  Delusional      Affective Functioning: Congruent      Mood: calm      Level of consciousness:  Alert, Oriented x4 and Attentive      Response to Learning: Progressing to goal      Endings: None Reported    Modes of Intervention: Education, Support, Socialization and Activity      Discipline Responsible: Psychoeducational Specialist      Signature:  Steve Langston

## 2021-02-26 NOTE — PROGRESS NOTES
Comprehensive Nutrition Assessment    Type and Reason for Visit:  Initial, Positive Nutrition Screen    Nutrition Recommendations/Plan:  Provide High calorie ONS (ensure) at breakfast, pudding ONS (boost pudding) at lunch, and frozen ONS (magic cup) at dinner to promote weight gain. Recommend obtaining new bedscale weight. Nutrition Assessment:  Pt admitted for psychiatric evaluation. Recieved postive nutrition screen for poor appetite and wt loss. Appetite seems to vary per notes. Unable to determine wt loss due to insufficient data in EMR. Pt's mother states he barely ate at home. Pt states \"he can cure himself with rest and diet\". RN reports pt is extremely thin in appearance. Seems to be eating better per notes. Will provide frozen ONS at dinner and pudding ONS at lunch and high calorie ONS at breakfast to optimize nutrition. Malnutrition Assessment:  Malnutrition Status:  Insufficient data    Context:  Social/Environmental Circumstances     Findings of the 6 clinical characteristics of malnutrition:  Energy Intake:  Mild decrease in energy intake (Comment)  Weight Loss:  Unable to assess     Body Fat Loss:  1 - Mild body fat loss(per RN) Triceps   Muscle Mass Loss:  1 - Mild muscle mass loss(per RN) Clavicles (pectoralis & deltoids), Temples (temporalis)  Fluid Accumulation:  No significant fluid accumulation     Strength:  Not Performed    Estimated Daily Nutrient Needs:  Energy (kcal):  3531-3084 (25-28kcal/kg); Weight Used for Energy Requirements:  Admission(63.5kg)     Protein (g):  73-87g (1-1.2g/kg); Weight Used for Protein Requirements:  Ideal(73kg)        Fluid (ml/day):  ~1700ml; Method Used for Fluid Requirements:  1 ml/kcal      Wounds:  None       Current Nutrition Therapies:    DIET GENERAL;     Anthropometric Measures:  · Height: 5' 9\" (175.3 cm)  · Current Body Weight: (unknown, please obtain new wt)   · Admission Body Weight: 140 lb (63.5 kg)(stated) · Usual Body Weight: 140 lb (63.5 kg)(stated)     · Ideal Body Weight: 160 lbs; % Ideal Body Weight   87%  · BMI:  20.67 based on stated weight  · BMI Categories: Normal Weight (BMI 18.5-24. 9)       Nutrition Diagnosis:   · Inadequate oral intake related to psychological cause or life stress as evidenced by poor intake prior to admission, weight loss(per family)    Nutrition Interventions:   Food and/or Nutrient Delivery:  Continue Current Diet, Start Oral Nutrition Supplement(Frozen ONS @ D, Pudding ONS @ L, high avni ONS @ B)  Nutrition Education/Counseling:  Education not indicated   Coordination of Nutrition Care:  Continue to monitor while inpatient    Goals:  Pt to consume >75% of meals and ons. Weight gain.        Nutrition Monitoring and Evaluation:   Behavioral-Environmental Outcomes:  None Identified   Food/Nutrient Intake Outcomes:  Food and Nutrient Intake, Supplement Intake  Physical Signs/Symptoms Outcomes:  Biochemical Data, Weight     Discharge Planning:    No discharge needs at this time     Electronically signed by Ashley Kearney MS, RD, LD on 2/26/21 at 11:38 AM EST

## 2021-02-26 NOTE — PROGRESS NOTES
Pt out of room more but still isolative to self. Pt watchful of others and staff. Pt is calm and cooperative. Pt states he does not need medication. Pt states anxiety is 5/10 and denies depression. Pt denies SI, HI, AVH but is heard talking and laughing to self.   Electronically signed by Charles Hanks RN on 2/26/2021 at 6:08 PM

## 2021-02-26 NOTE — GROUP NOTE
Group Therapy Note    Date: 2/26/2021    Group Start Time: 0230  Group End Time: 0330  Group Topic: Psychoeducation    [facility] ESTRELLA Roque LSW        Group Therapy Note    Attendees: 7         Patient's Goal:  To participate     Notes:  Patient shared ideas of stress causation and stress reduction techniques. Status After Intervention:  Improved    Participation Level:  Active Listener    Participation Quality: Sharing      Speech:  normal      Thought Process/Content: Logical      Affective Functioning: Congruent      Mood: elevated      Level of consciousness:  Alert      Response to Learning: Able to verbalize current knowledge/experience      Endings: None Reported    Modes of Intervention: Education      Discipline Responsible: /Counselor      Signature:  ESTRELLA Miller LSW

## 2021-02-26 NOTE — PROGRESS NOTES
Patient did not attend group despite staff encouragement.   Electronically signed by Helio Limon on 2/25/2021 at 7:08 PM

## 2021-02-26 NOTE — PROGRESS NOTES
Pt. presents pleasant with flat affect. Reports \"many contradictions in my head and that includes 2 girls one from my past and one in my future. I'm here because my philosophy about the worlded ending. There are 2 teams and they have a knife to my back. \" Reports \"losing  on reality because appetite has decreased and that is what happens. \" Preoccupied about still being single. Poor sleep. Has poor focus and concentration. Works full time. Reports AH being \"the voice of God but denies VH. Denies si/hi. Denies drinking ETOH and does smoke marijuana but does not use any other drugs. Is not med compliant.  Stressors include  1.mother  2.my future  3.the fact I am still single  *listen to music, watch tv, hike and be outdoors  Electronically signed by Vick Carney on 2/26/2021 at 9:39 AM

## 2021-02-26 NOTE — PROGRESS NOTES
Patient up to nurse's station with complaint of anxiety and inability to settle down, patient stating he was having racing thoughts as well. Patient accepted PRN Ativan. Will continue to monitor.

## 2021-02-26 NOTE — PROGRESS NOTES
Patient up to nurse's station with complaint of insomnia. Patient given PRN Trazodone. Will continue to monitor.

## 2021-02-26 NOTE — PROGRESS NOTES
Pt has been isolating to room. Pt states he feels there is a fellow pt who is making remarks to him and harassing him. Pt states when he was walking out of the shower, said pt stated \"well we know what orientation you are. \" Staff made aware and pt encouraged to come to staff if there are further issues and encouraged pt to avoid peer. Pt denies SI, HI, AVH. 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 \" Pt laughs and states \"It's not like I hear a little voice talking to me. It's my own thoughts having a conversation with myself. It may have different voices that I'm doing myself in my head. \" Pt states he had been up for \"2 sun ups and 2 sundowns\" and states this may have contributed to my \"delusional thinking. \" Pt denies that he is having mental health issues and states he is just at a cross roads and stayed up too long. Pt appears watchful and guarded. 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 Pt states his sleep and appetite are poor. Pt denies that he is losing weight d/t poor appetite and states \"Oh my pants? They're just loose because I haven't done the pant shrinking thing. \" Pt minimizes the need to be here.   Electronically signed by Levar Hernandez RN on 2/26/2021 at 4:38 PM

## 2021-02-26 NOTE — GROUP NOTE
Group Therapy Note    Date: 2/26/2021    Group Start Time: 1630  Group End Time: 1700  Group Topic: Healthy Living/Wellness    [facility] RONNIE Camp        Group Therapy Note    Attendees: 7/19         Patient's Goal:  To learn about healthy self esteem and how to build it. Notes:  Patient participated in group discussion.     Status After Intervention:  Improved    Participation Level: Interactive    Participation Quality: Appropriate, Attentive and Sharing      Speech:  normal      Thought Process/Content: Logical      Affective Functioning: Flat      Mood: euthymic      Level of consciousness:  Alert and Attentive      Response to Learning: Able to verbalize current knowledge/experience      Endings: None Reported    Modes of Intervention: Education      Discipline Responsible: [address], n[employer]      Signature:  Tala Camp

## 2021-02-26 NOTE — GROUP NOTE
Group Therapy Note    Date: 2/26/2021    Group Start Time: 1330  Group End Time: 1400  Group Topic: Healthy Living/Wellness    [facility] I    Venita Ly RN        Group Therapy Note    Attendees: 8/21         Patient's Goal:  To identify ways to improve self esteem    Notes: Attentive and appropriate    Status After Intervention:  Improved    Participation Level:  Active Listener    Participation Quality: Attentive      Speech:  normal      Thought Process/Content: Linear      Affective Functioning: Blunted and Flat      Mood: WNL      Level of consciousness:  Alert and Oriented x4      Response to Learning: Able to verbalize/acknowledge new learning      Endings: None Reported    Modes of Intervention: Education      Discipline Responsible: Registered Nurse      Signature:  Venita Ly RN

## 2021-02-26 NOTE — PROGRESS NOTES
Patient did not attend group despite staff encouragement.   Electronically signed by Mervin Frank on 2/25/2021 at 9:16 PM

## 2021-02-26 NOTE — H&P
Department of Psychiatry  History and Physical - Adult     CHIEF COMPLAINT:  Psychosis    History obtained from:  patient    Patient was seen after discussing with the treatment team and reviewing the chart    HISTORY OF PRESENT ILLNESS:      ER report:  32year old male presents to ED via his parents. Parents complaint is of non-compliance with his medications. He is highly manic and delusional. He lives with his parents, and they are unable to manage his care. Patient presents highly manic, constant rapid and pressured speech. Completely disorganized in thought process. Patient speech and responses are not in context to topics in conversation. Patient appears highly dishevled, and is extremely thin. Patient family reports he barely eats or sleep. Patinet family reports non-compliance for many months. Patient is convinced he can cure himself with rest and diet. Denies any HI or SI. Denies any mental illness. Blames others for his problems. Seems to understand he is manic, but delusionally blames it on his delusions       During interview:    Pt was last admitted during Jan 2020. Was seeing [facility] psychiatry staff. Quit seeing cely during summer when he had to relocate for his work. Quit taking medication. Pt thought that the world is ending and people are chasing him with the knife at this back  If he fall off to sleep then he will not wake up alive, be dead.   So not sleeping for few days  Has been manic with hyperactive behavior and grandiose thinking  Rapid pressured speech, with irritability and anger issues  Has been getting impulsivity  Pt showed his natural birth celeste on his back, and he think he is a \" star child\"  He has been getting odd ideas and strange connections  Telling that they have a \"knife to my back\" - it is a metaphor and not real  I always talk metaphorically    Mom collateral: Spoke with Parents Vee Roberts and Jaleesa Bernard [phone] after parents gave HIPAA code. Parents think pt is suffering from anxiety and depression, from being around a lot of people this is when they notice pt gets most anxious. Pt started a job and he seems to enjoy it. Parents state job is at [employer] in [place]. Parents state that the job is low paying however, is like a apenticeship where pt is learning a lot. Mom is concerened for him to keep job due to admission and is currently calling off from him. Mother states she is vague when calling off for pt because she feels \"it is not her business to tell them that pt is here for mental health\". Mother states all of past instances from mental health breaks have been in the winter. Parents are wondering if there is some sort of correlation with a seasonal disorder. Mother states she notices pt easily get agitated and anxious when around big groups of people. Parents state they think this has become worse since COVID hit due to isolating to the house. Parents state they were in [place] on Monday and pt called them and said he felt anxious and sad and asked them to come home. Parents states they \"got the first flight home on Tuesday and were home by Tuesday night\". Mom states pt has not been sleeping or eating and that she has been not only trying to encourage him to eat and drink, but also giving different options on food choices. Mom states Michael Carney has not eaten good in a few days and has lost weight\". Additionally, mom states she has only gotten him to fall asleep by giving pt melatonin and even then pt is only sleeping a \"few hours\". Mother and father state that they are sacred for pt and concerned for his physical and mental wellbeing. Mom states that yesterday she got pt into see his PCP with much encouragement for him to go. Per mom \"PCP immediately suggested that parents take pt to the ED for psychiatric treatment\".  Mom states \"I sternly talked to Dioni Walden and Education: [employer], in college  Employment: full time work, not sure he still got it - he was on probation  Relationships: single  Children: no children  Current Support: parents    Legal Hx: none  Access to weapons?:  No      EXAMINATION:    REVIEW OF SYSTEMS:    ROS:  [x] All negative/unchanged except if checked.  Explain positive(checked items) below:  [] Constitutional  [] Eyes  [] Ear/Nose/Mouth/Throat  [] Respiratory  [] CV  [] GI  []   [] Musculoskeletal  [] Skin/Breast  [] Neurological  [] Endocrine  [] Heme/Lymph  [] Allergic/Immunologic    Explanation:     Vitals:  /72   Pulse 101   Temp 97 °F (36.1 °C) (Oral)   Resp 18   Ht 5' 9\" (1.753 m)   Wt 140 lb (63.5 kg)   SpO2 99%   BMI 20.67 kg/m²      Neurologic Exam:   Muscle Strength & Tone: full ROM  Gait: normal gait   Involuntary Movements: No    Mental Status Examination:    Level of consciousness:  within normal limits   Appearance:  ill-appearing  Behavior/Motor:  responding to internal stimuli  Attitude toward examiner:  cooperative  Speech:  slow   Mood:angry irritable, manic  Affect:  mood incongruent  Thought processes:  Disorganized, flight of ideas  Thought content:  Suicidal Ideation:  denies  Delusions:  Paranoid delusion, grandiose ++  Perceptual Disturbance:  AH++  Cognition:  oriented to person, place, and time   Concentration intact  Memory intact  Insight poor   Judgement poor   Fund of Knowledge limited    Mini Mental Status 30/30      DIAGNOSIS:  Schizoaffective disorder - bipolar type  MJ use disorder          RISK ASSESSMENT:    SUICIDE RISK ASSESSMENT: high  HOMICIDE: low  AGITATION/VIOLENCE: high  ELOPEMENT: high    LABS: REVIEWED TODAY:  Recent Labs     02/24/21  1832   WBC 7.8   HGB 15.3        Recent Labs     02/24/21  1832      K 4.0      CO2 27   BUN 15   CREATININE 0.82   GLUCOSE 113*     Recent Labs     02/24/21  1832   BILITOT 0.9*   ALKPHOS 84   AST 9   ALT 10     Lab Results

## 2021-02-26 NOTE — GROUP NOTE
Group Therapy Note    Date: 2/26/2021    Group Start Time: 1010  Group End Time: 6253  Group Topic: Psychoeducation    [facility]    ESTRELLA Solorzano LSW        Group Therapy Note    Attendees:6         Patient's Goal:  To participate in group therapy and to identify one new goal.    Notes:  Patient's goal is to work on his relationship with his parents.  Patient shared with the group that his parents don't understand him and he wants \"things\" to be better    Status After Intervention:  Improved    Participation Level: Interactive    Participation Quality: Appropriate      Speech:  normal      Thought Process/Content: Logical      Affective Functioning: Congruent      Mood: calm      Level of consciousness:  Alert      Response to Learning: Able to verbalize current knowledge/experience      Endings: None Reported    Modes of Intervention: Education      Discipline Responsible: /Counselor      Signature:  ESTRELLA Solorzano, KASSIDY

## 2021-02-26 NOTE — PROGRESS NOTES
Behavioral Services  Medicare Certification Upon Admission    I certify that this patient's inpatient psychiatric hospital admission is medically necessary for:    [x] (1) Treatment which could reasonably be expected to improve this patient's condition,       [x] (2) Or for diagnostic study;     AND     [x](2) The inpatient psychiatric services are provided while the individual is under the care of a physician and are included in the individualized plan of care.     Estimated length of stay/service 3-5 days    Plan for post-hospital care - establish appropriate level of follow up care        Electronically signed by Mohan Ceballos MD on 2/26/2021 at 10:03 AM

## 2021-02-26 NOTE — SUICIDE SAFETY PLAN
SAFETY PLAN    A suicide Safety Plan is a document that supports someone when they are having thoughts of suicide. Warning Signs that indicate a suicidal crisis may be developing: What (situations, thoughts, feelings, body sensations, behaviors, etc.) do you experience that lets you know you are beginning to think about suicide? 1. Lose appetite  2. Stomach issues  3. Isolate more    Internal Coping Strategies:  What things can I do (relaxation techniques, hobbies, physical activities, etc.) to take my mind off my problems without contacting another person? 1. Play guitar  2. sing  3. research    People and social settings that provide distraction: Who can I call or where can I go to distract me? 1. Name: mom Phone: has#  2. Name: dad  Phone: has#   3. Place: the woods           4. Place: my room    People whom I can ask for help: Who can I call when I need help - for example, friends, family, clergy, someone else? 1. Name: mom            Phone: has#  2. Name: dad  Phone: has#      Professionals or [facility] agencies I can contact during a crisis: Who can I call for help - for example, my doctor, my psychiatrist, my psychologist, a mental health provider, a suicide hotline? 1. Clinician Name: connection to outpt        2. Suicide Prevention Lifeline: 7-638-660-TALK (5788)    3. [address] [place] [address] Emergency Services -  for example, [facility] suicide hotline, [facility] Hotline: 828      Emergency Services Address: [address] [place] [address]      Emergency Services Phone: [phone]    Making the environment safe: How can I make my environment (house/apartment/living space) safer? For example, can I remove guns, medications, and other items? 1. Being home alone  2.  Locking the doors

## 2021-02-26 NOTE — PROGRESS NOTES
Pt. attended the 0900 community meeting.  Electronically signed by Ino Narvaez on 2/26/2021 at 12:34 PM

## 2021-02-27 PROCEDURE — 99233 SBSQ HOSP IP/OBS HIGH 50: CPT | Performed by: PSYCHIATRY & NEUROLOGY

## 2021-02-27 PROCEDURE — 1240000000 HC EMOTIONAL WELLNESS R&B

## 2021-02-27 PROCEDURE — 6370000000 HC RX 637 (ALT 250 FOR IP): Performed by: PSYCHIATRY & NEUROLOGY

## 2021-02-27 RX ORDER — ZOLPIDEM TARTRATE 5 MG/1
10 TABLET ORAL NIGHTLY
Status: DISCONTINUED | OUTPATIENT
Start: 2021-02-27 | End: 2021-03-05 | Stop reason: HOSPADM

## 2021-02-27 RX ORDER — CHOLECALCIFEROL (VITAMIN D3) 125 MCG
5 CAPSULE ORAL NIGHTLY PRN
Status: DISCONTINUED | OUTPATIENT
Start: 2021-02-27 | End: 2021-03-05 | Stop reason: HOSPADM

## 2021-02-27 RX ADMIN — DIVALPROEX SODIUM 250 MG: 250 TABLET, DELAYED RELEASE ORAL at 06:25

## 2021-02-27 RX ADMIN — HYDROXYZINE PAMOATE 50 MG: 50 CAPSULE ORAL at 00:50

## 2021-02-27 RX ADMIN — ARIPIPRAZOLE 10 MG: 10 TABLET ORAL at 08:57

## 2021-02-27 RX ADMIN — DIVALPROEX SODIUM 250 MG: 250 TABLET, DELAYED RELEASE ORAL at 20:13

## 2021-02-27 RX ADMIN — NICOTINE POLACRILEX 2 MG: 2 GUM, CHEWING BUCCAL at 18:03

## 2021-02-27 RX ADMIN — DIVALPROEX SODIUM 250 MG: 250 TABLET, DELAYED RELEASE ORAL at 13:58

## 2021-02-27 RX ADMIN — NICOTINE POLACRILEX 2 MG: 2 GUM, CHEWING BUCCAL at 14:29

## 2021-02-27 RX ADMIN — ZOLPIDEM TARTRATE 10 MG: 5 TABLET ORAL at 20:13

## 2021-02-27 RX ADMIN — NICOTINE POLACRILEX 2 MG: 2 GUM, CHEWING BUCCAL at 10:52

## 2021-02-27 NOTE — PROGRESS NOTES
Patient did not attend group despite staff encouragement.   Electronically signed by Kvng Dominguez on 2/26/2021 at 9:15 PM

## 2021-02-27 NOTE — PROGRESS NOTES
Juan [facility] FOLLOW-UP NOTE       2/27/2021     Patient was seen and examined in person, Chart reviewed   Patient's case discussed with staff/team    Chief Complaint: ann psychosis    Interim History:     Pt slept only for 3 hours last night  Feeling tired and lethargic but see him walking in the unit  Pt still has racing thoughts  Feel manic and hyperactive although able to be redirected  Has been compliant with medication  Did not want seroquel  Took ambien last night  Appetite:   [] Normal/Unchanged  [] Increased  [x] Decreased      Sleep:       [] Normal/Unchanged  [x] Fair       [] Poor              Energy:    [] Normal/Unchanged  [x] Increased  [] Decreased        SI [] Present  [] Absent    HI  []Present  [] Absent     Aggression:  [] yes  [] no    Patient is [] able  [x] unable to CONTRACT FOR SAFETY     PAST MEDICAL/PSYCHIATRIC HISTORY:   History reviewed. No pertinent past medical history.     FAMILY/SOCIAL HISTORY:  Family History   Family history unknown: Yes     Social History     Socioeconomic History    Marital status: Single     Spouse name: Not on file    Number of children: Not on file    Years of education: Not on file    Highest education level: Not on file   Occupational History    Not on file   Social Needs    Financial resource strain: Not on file    Food insecurity     Worry: Not on file     Inability: Not on file    Transportation needs     Medical: Not on file     Non-medical: Not on file   Tobacco Use    Smoking status: Current Every Day Smoker     Packs/day: 1.00     Types: Cigarettes    Smokeless tobacco: Never Used   Substance and Sexual Activity    Alcohol use: Not Currently    Drug use: Yes     Types: Marijuana    Sexual activity: Not on file     Comment: pt will not comment    Lifestyle    Physical activity     Days per week: Not on file     Minutes per session: Not on file    Stress: Not on file   Relationships  Social connections     Talks on phone: Not on file     Gets together: Not on file     Attends Yarsani service: Not on file     Active member of club or organization: Not on file     Attends meetings of clubs or organizations: Not on file     Relationship status: Not on file    Intimate partner violence     Fear of current or ex partner: Not on file     Emotionally abused: Not on file     Physically abused: Not on file     Forced sexual activity: Not on file   Other Topics Concern    Not on file   Social History Narrative    Not on file           ROS:  [x] All negative/unchanged except if checked.  Explain positive(checked items) below:  [] Constitutional  [] Eyes  [] Ear/Nose/Mouth/Throat  [] Respiratory  [] CV  [] GI  []   [] Musculoskeletal  [] Skin/Breast  [] Neurological  [] Endocrine  [] Heme/Lymph  [] Allergic/Immunologic    Explanation:     MEDICATIONS:    Current Facility-Administered Medications:     ARIPiprazole (ABILIFY) tablet 10 mg, 10 mg, Oral, Daily, Tavia Hernandez MD, 10 mg at 02/27/21 0857    divalproex (DEPAKOTE) DR tablet 250 mg, 250 mg, Oral, 3 times per day, Tavia Hernandez MD, 250 mg at 02/27/21 3112    zolpidem (AMBIEN) tablet 5 mg, 5 mg, Oral, Nightly, Tavia Hernandez MD, 5 mg at 02/26/21 2114    acetaminophen (TYLENOL) tablet 650 mg, 650 mg, Oral, Q4H PRN, Tavia Hernandez MD    polyethylene glycol (GLYCOLAX) packet 17 g, 17 g, Oral, Daily PRN, Tavia Hernandez MD    haloperidol lactate (HALDOL) injection 5 mg, 5 mg, Intramuscular, Q6H PRN **OR** haloperidol (HALDOL) tablet 5 mg, 5 mg, Oral, Q6H PRN, Tavia Hernandez MD    hydrOXYzine (VISTARIL) injection 50 mg, 50 mg, Intramuscular, Q6H PRN **OR** hydrOXYzine (VISTARIL) capsule 50 mg, 50 mg, Oral, Q6H PRN, Tavia Hernandez MD, 50 mg at 02/27/21 0050    nicotine polacrilex (NICORETTE) gum 2 mg, 2 mg, Oral, PRN, Tavia Hernandez MD, 2 mg at 02/26/21 1123   LORazepam (ATIVAN) tablet 1 mg, 1 mg, Oral, Q6H PRN, 1 mg at 02/26/21 0415 **OR** LORazepam (ATIVAN) injection 1 mg, 1 mg, Intramuscular, Q6H PRN, Eddie Felipe MD    influenza quadrivalent split vaccine (FLUZONE;FLUARIX;FLULAVAL;AFLURIA) injection 0.5 mL, 0.5 mL, Intramuscular, Prior to discharge, Eddie Felipe MD      Examination:  /74   Pulse 94   Temp 97 °F (36.1 °C) (Oral)   Resp 18   Ht 5' 9\" (1.753 m)   Wt 140 lb (63.5 kg)   SpO2 97%   BMI 20.67 kg/m²   Gait - steady  Medication side effects(SE): no    Mental Status Examination:    Level of consciousness:  within normal limits   Appearance:  poor grooming and poor hygiene  Behavior/Motor:  psychomotor agitation  Attitude toward examiner:  cooperative  Speech:  rapid   Mood: labile  Affect:  mood incongruent  Thought processes:  circumstantial   Thought content:  Suicidal Ideation:  denies suicidal ideation  Delusions:  grandiose  Perceptual Disturbance:  denies any perceptual disturbance  Cognition:  oriented to person, place, and time   Concentration poor  Insight poor   Judgement poor     ASSESSMENT:   Patient symptoms are:  [] Well controlled  [] Improving  [] Worsening  [x] No change      Diagnosis:   Active Problems:    [facility]ffective [facility]  Resolved Problems:    * No resolved hospital problems.  *      LABS:    Recent Labs     02/24/21  1832   WBC 7.8   HGB 15.3        Recent Labs     02/24/21  1832      K 4.0      CO2 27   BUN 15   CREATININE 0.82   GLUCOSE 113*     Recent Labs     02/24/21  1832   BILITOT 0.9*   ALKPHOS 84   AST 9   ALT 10     Lab Results   Component Value Date    LABAMPH Neg 02/24/2021    BARBSCNU Neg 02/24/2021    LABBENZ Neg 02/24/2021    LABMETH Neg 02/24/2021    OPIATESCREENURINE Neg 02/24/2021    PHENCYCLIDINESCREENURINE Neg 02/24/2021    ETOH <10 02/24/2021     Lab Results   Component Value Date    TSH 1.970 02/24/2021     No results found for: LITHIUM

## 2021-02-27 NOTE — GROUP NOTE
Group Therapy Note    Date: 2/27/2021    Group Start Time: 0258  Group End Time: 1763  Group Topic: Relaxation    [facility]    Natalie Lopez RN; Oneil Amezquita RN        Group Therapy Note    Attendees: 7         Patient's Goal:  Discussed relaxation techniques      Notes:  participated    Status After Intervention:  Unchanged    Participation Level:  Active Listener    Participation Quality: Attentive      Speech:  normal      Thought Process/Content: Logical      Affective Functioning: Flat      Mood: anxious      Level of consciousness:  Alert      Response to Learning: Able to verbalize current knowledge/experience      Endings: None Reported    Modes of Intervention: Education      Discipline Responsible: Registered Nurse      Signature:  Natalie Lopez RN

## 2021-02-27 NOTE — PROGRESS NOTES
Patient did not attend group despite staff encouragement.   Electronically signed by Jackson Abbott on 2/27/2021 at 12:16 PM

## 2021-02-27 NOTE — PROGRESS NOTES
Pt requesting another sleep aid, states he feels that the Ambien has worn off and that he is restless. Medicated with PRN vistaril 50 mg PO @ 3780.

## 2021-02-27 NOTE — PROGRESS NOTES
Pt noted to be resting in room s/p vistaril however is now awake at fridge for PO fluids. Voices no concerns at this time and returns to assigned room.

## 2021-02-27 NOTE — GROUP NOTE
Group Therapy Note    Date: 2/27/2021    Group Start Time: 1100  Group End Time: 1200  Group Topic: Psychoeducation    [facility] RONNIE Bentley, CTRS        Group Therapy Note    Attendees: 5         Patient's Goal:  \"to stay awake\"    Notes:  Pt. Attended the 1100 skill group. Quiet and to himself. Status After Intervention:  Unchanged    Participation Level:  Active Listener and Minimal    Participation Quality: Appropriate and Attentive      Speech:  normal      Thought Process/Content: Logical      Affective Functioning: Flat      Mood: calm      Level of consciousness:  Alert, Oriented x4, Attentive and Preoccupied      Response to Learning: Progressing to goal      Endings: None Reported    Modes of Intervention: Education, Support, Socialization and Activity      Discipline Responsible: Psychoeducational Specialist      Signature:  Kiley Weiss

## 2021-02-27 NOTE — PROGRESS NOTES
Pt's mother phoned with HIPAA code and provided with update per request. Mother states pt has called her a few times today and mother states pt sounds like he is improving.

## 2021-02-27 NOTE — PROGRESS NOTES
Pt. attended the 0900 community meeting.  Electronically signed by Junie Olszewski on 2/27/2021 at 1:40 PM

## 2021-02-27 NOTE — GROUP NOTE
Group Therapy Note    Date: 2/27/2021    Group Start Time: 1430  Group End Time: 7018  Group Topic: Healthy Living/Wellness    [facility]    Dimitri Vega        Group Therapy Note    Attendees: 7/17       Patient's Goal:  To discuss healthy coping strategies and share experiences of when challenges have been overcome. Notes:  Pt actively participated in the [address] Status After Intervention:  Improved    Participation Level:  Active Listener and Interactive    Participation Quality: Appropriate, Attentive, Sharing and Supportive      Speech:  normal      Thought Process/Content: Logical      Affective Functioning: Congruent      Mood: euthymic      Level of consciousness:  Alert and Attentive      Response to Learning: Able to verbalize current knowledge/experience and Progressing to goal      Endings: None Reported    Modes of Intervention: Education      Discipline Responsible: [address], [facility] [employer]      Signature:  Dimitri Vega

## 2021-02-27 NOTE — GROUP NOTE
Group Therapy Note    Date: 2/26/2021    Group Start Time: 8637  Group End Time: 1900  Group Topic: Recreational    [facility]    Ignacia Lab        Group Therapy Note    Attendees: 9/19         Patient's Goal:  To play Wii Lockboxling with the group. Notes:  Patient played the game with peers.     Status After Intervention:  Improved    Participation Level: Interactive    Participation Quality: Appropriate, Attentive and Supportive      Speech:  normal      Thought Process/Content: Logical      Affective Functioning: Congruent      Mood: euthymic      Level of consciousness:  Alert and Attentive      Response to Learning: Progressing to goal      Endings: None Reported    Modes of Intervention: Activity      Discipline Responsible: [employer]      Signature:  Ignacia Miramontes

## 2021-02-28 PROCEDURE — 99232 SBSQ HOSP IP/OBS MODERATE 35: CPT | Performed by: PSYCHIATRY & NEUROLOGY

## 2021-02-28 PROCEDURE — 1240000000 HC EMOTIONAL WELLNESS R&B

## 2021-02-28 PROCEDURE — 6370000000 HC RX 637 (ALT 250 FOR IP): Performed by: PSYCHIATRY & NEUROLOGY

## 2021-02-28 RX ORDER — ARIPIPRAZOLE 15 MG/1
15 TABLET ORAL DAILY
Status: DISCONTINUED | OUTPATIENT
Start: 2021-03-01 | End: 2021-03-05 | Stop reason: HOSPADM

## 2021-02-28 RX ADMIN — ARIPIPRAZOLE 10 MG: 10 TABLET ORAL at 08:34

## 2021-02-28 RX ADMIN — NICOTINE POLACRILEX 2 MG: 2 GUM, CHEWING BUCCAL at 13:26

## 2021-02-28 RX ADMIN — DIVALPROEX SODIUM 250 MG: 250 TABLET, DELAYED RELEASE ORAL at 13:26

## 2021-02-28 RX ADMIN — NICOTINE POLACRILEX 2 MG: 2 GUM, CHEWING BUCCAL at 06:56

## 2021-02-28 RX ADMIN — DIVALPROEX SODIUM 250 MG: 250 TABLET, DELAYED RELEASE ORAL at 06:21

## 2021-02-28 RX ADMIN — Medication 5 MG: at 20:41

## 2021-02-28 RX ADMIN — NICOTINE POLACRILEX 2 MG: 2 GUM, CHEWING BUCCAL at 08:52

## 2021-02-28 RX ADMIN — DIVALPROEX SODIUM 250 MG: 250 TABLET, DELAYED RELEASE ORAL at 20:41

## 2021-02-28 RX ADMIN — NICOTINE POLACRILEX 2 MG: 2 GUM, CHEWING BUCCAL at 17:34

## 2021-02-28 RX ADMIN — ZOLPIDEM TARTRATE 10 MG: 5 TABLET ORAL at 20:41

## 2021-02-28 NOTE — GROUP NOTE
Group Therapy Note    Date: 2/28/2021    Group Start Time: 1100  Group End Time: 1200  Group Topic: Psychoeducation    [facility] RONNIE Leo, CTRS        Group Therapy Note    Attendees: 7         Patient's Goal:  \"to go to groups and to take meds\"    Notes:  Pt. attended the 1100 skill group. Worked on project with interest. Preoccupied with being hungry. Status After Intervention:  Improved    Participation Level:  Active Listener and Interactive    Participation Quality: Appropriate, Attentive and Sharing      Speech:  normal      Thought Process/Content: Logical      Affective Functioning: Congruent      Mood: calm      Level of consciousness:  Alert, Oriented x4, Attentive and Preoccupied      Response to Learning: Progressing to goal      Endings: None Reported    Modes of Intervention: Education, Support, Socialization and Activity      Discipline Responsible: Psychoeducational Specialist      Signature:  Eliana Roberts

## 2021-02-28 NOTE — GROUP NOTE
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      Status After Intervention:  Improved    Participation Level: Interactive    Participation Quality: Sharing      Speech:  normal      Thought Process/Content: Logical      Affective Functioning: Congruent      Mood: anxious      Level of consciousness:  Alert      Response to Learning: Able to verbalize current knowledge/experience      Endings: None Reported    Modes of Intervention: Education      Discipline Responsible: /Counselor      Signature:  ESTRELLA Mcneill, KASSIDY

## 2021-02-28 NOTE — PROGRESS NOTES
Juan Diego Sheffield [facility]. FOLLOW-UP NOTE       2/28/2021     Patient was seen and examined in person, Chart reviewed   Patient's case discussed with staff/team    Chief Complaint: Paranoia      Interim History:     Patient continued to remain paranoid irritable and manic  He lacks insight into his current condition  He has been irritable with peers and staff  Patient denies any audiovisual hallucinations  He tried to hide his symptoms and his focus on discharging from the unit  Patient was noticed to be yelling and screaming on the phone with his mom  Appetite:   [x] Normal/Unchanged  [] Increased  [] Decreased      Sleep:       [] Normal/Unchanged  [x] Fair       [] Poor              Energy:    [] Normal/Unchanged  [x] Increased  [] Decreased        SI [] Present  [] Absent    HI  []Present  [] Absent     Aggression: Verbal[x] yes  [] no    Patient is [] able  [x] unable to CONTRACT FOR SAFETY     PAST MEDICAL/PSYCHIATRIC HISTORY:   History reviewed. No pertinent past medical history.     FAMILY/SOCIAL HISTORY:  Family History   Family history unknown: Yes     Social History     Socioeconomic History    Marital status: Single     Spouse name: Not on file    Number of children: Not on file    Years of education: Not on file    Highest education level: Not on file   Occupational History    Not on file   Social Needs    Financial resource strain: Not on file    Food insecurity     Worry: Not on file     Inability: Not on file    Transportation needs     Medical: Not on file     Non-medical: Not on file   Tobacco Use    Smoking status: Current Every Day Smoker     Packs/day: 1.00     Types: Cigarettes    Smokeless tobacco: Never Used   Substance and Sexual Activity    Alcohol use: Not Currently    Drug use: Yes     Types: Marijuana    Sexual activity: Not on file     Comment: pt will not comment    Lifestyle    Physical activity     Days per week: Not on file Minutes per session: Not on file    Stress: Not on file   Relationships    Social connections     Talks on phone: Not on file     Gets together: Not on file     Attends Episcopal service: Not on file     Active member of club or organization: Not on file     Attends meetings of clubs or organizations: Not on file     Relationship status: Not on file    Intimate partner violence     Fear of current or ex partner: Not on file     Emotionally abused: Not on file     Physically abused: Not on file     Forced sexual activity: Not on file   Other Topics Concern    Not on file   Social History Narrative    Not on file           ROS:  [x] All negative/unchanged except if checked.  Explain positive(checked items) below:  [] Constitutional  [] Eyes  [] Ear/Nose/Mouth/Throat  [] Respiratory  [] CV  [] GI  []   [] Musculoskeletal  [] Skin/Breast  [] Neurological  [] Endocrine  [] Heme/Lymph  [] Allergic/Immunologic    Explanation:     MEDICATIONS:    Current Facility-Administered Medications:     zolpidem (AMBIEN) tablet 10 mg, 10 mg, Oral, Nightly, April Ji MD, 10 mg at 02/27/21 2013    melatonin tablet 5 mg, 5 mg, Oral, Nightly PRN, April Ji MD    ARIPiprazole (ABILIFY) tablet 10 mg, 10 mg, Oral, Daily, April Ji MD, 10 mg at 02/28/21 0834    divalproex (DEPAKOTE) DR tablet 250 mg, 250 mg, Oral, 3 times per day, April Ji MD, 250 mg at 02/28/21 0798    acetaminophen (TYLENOL) tablet 650 mg, 650 mg, Oral, Q4H PRN, April Ji MD    polyethylene glycol (GLYCOLAX) packet 17 g, 17 g, Oral, Daily PRN, April Ji MD    haloperidol lactate (HALDOL) injection 5 mg, 5 mg, Intramuscular, Q6H PRN **OR** haloperidol (HALDOL) tablet 5 mg, 5 mg, Oral, Q6H PRN, April Ji MD    hydrOXYzine (VISTARIL) injection 50 mg, 50 mg, Intramuscular, Q6H PRN **OR** hydrOXYzine (VISTARIL) capsule 50 mg, 50 mg, Oral, Q6H PRN, April Ji MD, 50 mg at 02/27/21 0050   nicotine polacrilex (NICORETTE) gum 2 mg, 2 mg, Oral, PRN, Sandy Yu MD, 2 mg at 02/28/21 8356    LORazepam (ATIVAN) tablet 1 mg, 1 mg, Oral, Q6H PRN, 1 mg at 02/26/21 0415 **OR** LORazepam (ATIVAN) injection 1 mg, 1 mg, Intramuscular, Q6H PRN, Sandy Yu MD    influenza quadrivalent split vaccine (FLUZONE;FLUARIX;FLULAVAL;AFLURIA) injection 0.5 mL, 0.5 mL, Intramuscular, Prior to discharge, Sandy Yu MD      Examination:  BP (!) 145/82   Pulse 99   Temp 97 °F (36.1 °C) (Oral)   Resp 18   Ht 5' 9\" (1.753 m)   Wt 140 lb (63.5 kg)   SpO2 96%   BMI 20.67 kg/m²   Gait - steady  Medication side effects(SE): no    Mental Status Examination:    Level of consciousness:  within normal limits   Appearance:  poor grooming and poor hygiene  Behavior/Motor:  psychomotor agitation  Attitude toward examiner:  playful  Speech:  rapid   Mood: irritable  Affect:  mood incongruent  Thought processes:  rapid   Thought content:  Delusions:  paranoid  Cognition:  oriented to person, place, and time   Concentration poor  Insight poor   Judgement poor     ASSESSMENT:   Patient symptoms are:  [] Well controlled  [] Improving  [] Worsening  [] No change      Diagnosis:   Active Problems:    Schizoaffective disorder, bipolar type ([address]  Resolved Problems:    * No resolved hospital problems. *      LABS:    No results for input(s): WBC, HGB, PLT in the last 72 hours. No results for input(s): NA, K, CL, CO2, BUN, CREATININE, GLUCOSE in the last 72 hours. No results for input(s): BILITOT, ALKPHOS, AST, ALT in the last 72 hours.   Lab Results   Component Value Date    LABAMPH Neg 02/24/2021    BARBSCNU Neg 02/24/2021    LABBENZ Neg 02/24/2021    LABMETH Neg 02/24/2021    OPIATESCREENURINE Neg 02/24/2021    PHENCYCLIDINESCREENURINE Neg 02/24/2021    ETOH <10 02/24/2021     Lab Results   Component Value Date    TSH 1.970 02/24/2021     No results found for: LITHIUM  Lab Results   Component Value Date VALPROATE <2.8 (L) 02/24/2021       RISK ASSESSMENT:     Treatment Plan:  Reviewed current Medications with the patient. Medication as ordered  Risks, benefits, side effects, drug-to-drug interactions and alternatives to treatment were discussed. Collateral information:   CD evaluation  Encourage patient to attend group and other milieu activities.   Discharge planning discussed with the patient and treatment team.    PSYCHOTHERAPY/COUNSELING:  [x] Therapeutic interview  [x] Supportive  [] CBT  [] Ongoing  [] Other    [x] Patient continues to need, on a daily basis, active treatment furnished directly by or requiring the supervision of inpatient psychiatric personnel      Anticipated Length of stay            Electronically signed by Katerin Butcher MD on 2/28/2021 at 8:57 AM

## 2021-02-28 NOTE — PROGRESS NOTES
Pt. attended the 0900 community meeting.  Electronically signed by Eric Luna on 2/28/2021 at 9:51 AM

## 2021-02-28 NOTE — GROUP NOTE
Group Therapy Note    Date: 2/28/2021    Group Start Time: 1620  Group End Time: 1228  Group Topic: Healthy Living/Wellness    [facility]    Dimitri Vega        Group Therapy Note    Attendees: 9/18       Patient's Goal:  To learn about how to practice emotional regulation. Notes:  Pt actively participated in the [address] Status After Intervention:  Unchanged    Participation Level:  Active Listener and Interactive    Participation Quality: Appropriate and Attentive      Speech:  loud      Thought Process/Content: Logical      Affective Functioning: Flat      Mood: dysphoric      Level of consciousness:  Alert and Attentive      Response to Learning: Progressing to goal      Endings: None Reported    Modes of Intervention: Education      Discipline Responsible: [address], [facility] [employer]      Signature:  Dimitri Vega

## 2021-02-28 NOTE — GROUP NOTE
Group Therapy Note    Date: 2/27/2021    Group Start Time: 2045  Group End Time: 2100  Group Topic: Wrap-Up    [facility]    Lindsay Palencia RN        Group Therapy Note    Objective: To participate in wrap-up group and share whether or not goal was met. Attendees: 15/18       Pt did not participate in wrap-up group despite staff encouragement.     Discipline Responsible: Registered Nurse      Signature:  Lindsay Palencia RN

## 2021-03-01 LAB — VALPROIC ACID LEVEL: 63.7 UG/ML (ref 50–100)

## 2021-03-01 PROCEDURE — 6370000000 HC RX 637 (ALT 250 FOR IP): Performed by: PSYCHIATRY & NEUROLOGY

## 2021-03-01 PROCEDURE — 36415 COLL VENOUS BLD VENIPUNCTURE: CPT

## 2021-03-01 PROCEDURE — 1240000000 HC EMOTIONAL WELLNESS R&B

## 2021-03-01 PROCEDURE — 99232 SBSQ HOSP IP/OBS MODERATE 35: CPT | Performed by: PSYCHIATRY & NEUROLOGY

## 2021-03-01 PROCEDURE — 80164 ASSAY DIPROPYLACETIC ACD TOT: CPT

## 2021-03-01 RX ADMIN — ZOLPIDEM TARTRATE 10 MG: 5 TABLET ORAL at 20:52

## 2021-03-01 RX ADMIN — NICOTINE POLACRILEX 2 MG: 2 GUM, CHEWING BUCCAL at 17:20

## 2021-03-01 RX ADMIN — NICOTINE POLACRILEX 2 MG: 2 GUM, CHEWING BUCCAL at 20:17

## 2021-03-01 RX ADMIN — NICOTINE POLACRILEX 2 MG: 2 GUM, CHEWING BUCCAL at 09:34

## 2021-03-01 RX ADMIN — DIVALPROEX SODIUM 250 MG: 250 TABLET, DELAYED RELEASE ORAL at 13:41

## 2021-03-01 RX ADMIN — DIVALPROEX SODIUM 250 MG: 250 TABLET, DELAYED RELEASE ORAL at 20:52

## 2021-03-01 RX ADMIN — DIVALPROEX SODIUM 250 MG: 250 TABLET, DELAYED RELEASE ORAL at 06:00

## 2021-03-01 RX ADMIN — NICOTINE POLACRILEX 2 MG: 2 GUM, CHEWING BUCCAL at 13:18

## 2021-03-01 RX ADMIN — ARIPIPRAZOLE 15 MG: 15 TABLET ORAL at 09:14

## 2021-03-01 NOTE — GROUP NOTE
Group Therapy Note    Date: 2/28/2021    Group Start Time: 6072  Group End Time: 0273  Group Topic: Recreational    [facility]    Lashonda Walker        Group Therapy Note    Attendees: 9/19       Patient's Goal:  To participate in the group's game of [address] [email or web address].     Notes:  Pt actively participated in the Activity Group. Status After Intervention:  Unchanged    Participation Level:  Active Listener and Interactive    Participation Quality: Appropriate and Attentive      Speech:  normal      Thought Process/Content: Logical      Affective Functioning: Congruent      Mood: dysphoric      Level of consciousness:  Alert and Attentive      Response to Learning: Able to verbalize current knowledge/experience      Endings: None Reported    Modes of Intervention: Activity      Discipline Responsible: [address], [employer]      Signature:  Lashonda Walker

## 2021-03-01 NOTE — FLOWSHEET NOTE
Pt visible on unit. Seen eating meals and attending groups. Reports poor sleep r/t staff rounds, uncomfortable bed and lights under bed. Denied any fear of dying while sleeping. Reports good appetite; \"normal\". Thoughts disorganized, with rapid speech at times. Pt preoccupied with D/C. States, \"I have to play ball for a while with the doctor because I am being held hostage. I know he doesn't like me so I have to stay. He just looks at me with those cold, dead eyes, so I already know everything I say is going to be misconstrued\". Admits to \"some\" anxiety but denies any depression. Denies any SI, HI or AVH. Reports a poor relationship with his mother as she \"watches everything I do like a hawk\". States she is \"part of the reason\" for his \"mental break\".

## 2021-03-01 NOTE — GROUP NOTE
Group Therapy Note    Date: 3/1/2021    Group Start Time: 1410  Group End Time: 1450  Group Topic: Cognitive Skills    [facility]    GEORGETTE Do        Group Therapy Note    Attendees: 5         Patient's Goal: To participate in mood management group. Notes: Patient participated in learning about the [address]. He identified needing to grow his [facility]. Patient stated he tends to isolate and he needs to be more social. Staff and peers supported his efforts to grow in this domain. Status After Intervention:  Improved    Participation Level: Active Listener    Participation Quality: Appropriate      Speech:  normal      Thought Process/Content: Logical      Affective Functioning: Congruent      Mood: elevated      Level of consciousness:  Alert      Response to Learning: Able to verbalize current knowledge/experience      Endings: None Reported    Modes of Intervention: Education      Discipline Responsible: /Counselor      Signature:   GEORGETTE Do

## 2021-03-01 NOTE — PROGRESS NOTES
Pt's mother called with HIPAA code requesting update. Updated provided. Pt's mother would like pt (with the help of staff) to reach out to his job himself and explain current circumstances so pt does not lose job. Per mother, pt enjoys job and neither mother nor son want to lose job. Pt's mother states that pt sounds like he is improving overall.

## 2021-03-01 NOTE — GROUP NOTE
Group Therapy Note    Date: 2/28/2021    Group Start Time: 2100  Group End Time: 2120  Group Topic: Wrap-Up    [facility]    Namrata Phelps RN        Group Therapy Note           Patient's Goal:  Get sleep tonight and get on the right medications    Notes:  Patient pleasant and supportive of peers    Status After Intervention:  Improved    Participation Level:  Active Listener and Interactive    Participation Quality: Appropriate, Attentive, Sharing and Supportive      Speech:  normal      Thought Process/Content: Logical  Linear      Affective Functioning: Congruent      Mood: WNL      Level of consciousness:  Alert, Oriented x4 and Attentive      Response to Learning: Able to verbalize current knowledge/experience, Able to verbalize/acknowledge new learning, Able to retain information, Capable of insight, Able to change behavior and Progressing to goal      Endings: None Reported    Modes of Intervention: Support and Socialization      Discipline Responsible: Registered Nurse      Signature:  Namraat Phelps RN

## 2021-03-01 NOTE — PROGRESS NOTES
Pt states depression is # 2/10., anxiety is # 6/10. Denies SI/HI/AVH. Pt states he is eating as well as he can., Sleeps maybe 5 hrs per night. Showered today, attends all groups. ,

## 2021-03-01 NOTE — GROUP NOTE
Group Therapy Note    Date: 3/1/2021    Group Start Time: 7248  Group End Time: 6657  Group Topic: Healthy Living/Wellness    [facility]    Iván Rodgers        Group Therapy Note    Attendees: 7/16       Patient's Goal:  To learn how to practice distraction skills while in high distress. Notes:  Pt actively participated and contributed to the Healthy Living Group discussion. Status After Intervention:  Unchanged    Participation Level:  Active Listener and Interactive    Participation Quality: Appropriate and Attentive      Speech:  normal      Thought Process/Content: Logical      Affective Functioning: Constricted/Restricted      Mood: anxious      Level of consciousness:  Alert and Attentive      Response to Learning: Able to verbalize current knowledge/experience and Progressing to goal      Endings: None Reported    Modes of Intervention: Education      Discipline Responsible: [address], [employer]      Signature:  Iván Rodgers

## 2021-03-01 NOTE — PROGRESS NOTES
Pt attended 0915 morning community meeting.  Electronically signed by Andrew Chung OT on 3/1/2021 at 9:51 AM

## 2021-03-01 NOTE — GROUP NOTE
Group Therapy Note    Date: 3/1/2021    Group Start Time: 1100  Group End Time: 1150  Group Topic: Psychoeducation    [facility]    Jigar Cooney OT        Group Therapy Note    Attendees: 4         Patient's Goal:  \"To keep stress down and to be calm\"    Notes:  Pt demonstrated appropriate behaviors in group, actively engaged in group discussions, and attended to task. Status After Intervention:  Improved    Participation Level:  Active Listener and Interactive    Participation Quality: Appropriate, Attentive and Sharing      Speech:  normal      Thought Process/Content: Logical      Affective Functioning: Congruent      Mood: pleasant       Level of consciousness:  Alert, Oriented x4 and Attentive      Response to Learning: Able to verbalize current knowledge/experience, Able to retain information and Progressing to goal      Endings: None Reported    Modes of Intervention: Support, Socialization, Exploration and Activity      Discipline Responsible: Psychoeducational Specialist      Signature:  Jigar Cooney OT

## 2021-03-02 PROCEDURE — 1240000000 HC EMOTIONAL WELLNESS R&B

## 2021-03-02 PROCEDURE — 99232 SBSQ HOSP IP/OBS MODERATE 35: CPT | Performed by: PSYCHIATRY & NEUROLOGY

## 2021-03-02 PROCEDURE — 6370000000 HC RX 637 (ALT 250 FOR IP): Performed by: PSYCHIATRY & NEUROLOGY

## 2021-03-02 RX ADMIN — NICOTINE POLACRILEX 2 MG: 2 GUM, CHEWING BUCCAL at 17:47

## 2021-03-02 RX ADMIN — NICOTINE POLACRILEX 2 MG: 2 GUM, CHEWING BUCCAL at 09:30

## 2021-03-02 RX ADMIN — ZOLPIDEM TARTRATE 10 MG: 5 TABLET ORAL at 21:10

## 2021-03-02 RX ADMIN — DIVALPROEX SODIUM 250 MG: 250 TABLET, DELAYED RELEASE ORAL at 06:38

## 2021-03-02 RX ADMIN — ARIPIPRAZOLE 15 MG: 15 TABLET ORAL at 08:29

## 2021-03-02 RX ADMIN — DIVALPROEX SODIUM 250 MG: 250 TABLET, DELAYED RELEASE ORAL at 14:01

## 2021-03-02 RX ADMIN — DIVALPROEX SODIUM 250 MG: 250 TABLET, DELAYED RELEASE ORAL at 21:09

## 2021-03-02 RX ADMIN — NICOTINE POLACRILEX 2 MG: 2 GUM, CHEWING BUCCAL at 20:40

## 2021-03-02 NOTE — FLOWSHEET NOTE
Pt visible on unit. Seen eating meals and walking unit. Minimal socialization with peers noted. Pt becoming agitated while discussing not D/C today and changing beds to improve sleep. States, \"I need space\" and walked away. Moments after requesting nicotine gum; administered per request. Pt explained he thought by changing rooms for a different bed, this meant he was staying for another week. This nurse explained the room change was offered to provide another bed option to improve sleep. PT states, \"well I slept fine last night I don't need it\". When asked if we could resume talking, pt replied, \"there is nothing more I need to say to you\". PT explained his is \"super anxious and depressed\" about being \"held hostage\" here. Reports the \"only thing that helps is smoking a cigarette or singing and I cant do either here\". Offered sensory room; pt declines as the \"acustics aren't right\" . Encouraged deep breathing, pt replied, \"I cant do that, I have stunted lungs\". Pt noted to have loud, rapid and pressured speech. Remains preoccupied with D/C. Denies SI, HI or AVH. Continues to pace unit.

## 2021-03-02 NOTE — GROUP NOTE
Group Therapy Note    Date: 3/2/2021    Group Start Time: 3148  Group End Time: 7371  Group Topic: Psychotherapy    [facility]    MIKEY Dove        Group Therapy Note    Attendees: 7         Patient's Goal:  \"take it easy because I stress myself out. \"    Notes:  Paranoid and delusional at times     Status After Intervention:  Unchanged    Participation Level: Minimal    Participation Quality: Appropriate      Speech:  pressured      Thought Process/Content: Delusional      Affective Functioning: Exaggerated      Mood: elevated      Level of consciousness:  Alert      Response to Learning: Able to verbalize current knowledge/experience      Endings: None Reported    Modes of Intervention: Education      Discipline Responsible: /Counselor      Signature:  MIKEY Dove

## 2021-03-02 NOTE — GROUP NOTE
Group Therapy Note    Date: 3/2/2021    Group Start Time: 1330  Group End Time: 1400  Group Topic: Healthy Living/Wellness    [facility]    Lv Zayas RN        Group Therapy Note    Attendees: 8/14         Patient's Goal:  Learn benefits and practice guided meditation   Notes:   Active participant     Status After Intervention:  Unchanged    Participation Level: Interactive    Participation Quality: Appropriate and Attentive      Speech:  normal      Thought Process/Content: Logical  Linear      Affective Functioning: Congruent      Mood: euthymic      Level of consciousness:  Alert and Oriented x4      Response to Learning: Able to verbalize current knowledge/experience      Endings: None Reported    Modes of Intervention: Education and Support      Discipline Responsible: Registered Nurse      Signature:  Lv Zayas RN

## 2021-03-02 NOTE — PROGRESS NOTES
Patient did not attend group despite staff encouragement.   Electronically signed by Marjorie Fuller on 3/2/2021 at 4:55 PM

## 2021-03-02 NOTE — PROGRESS NOTES
Pt. refused to attend the 1000 skills group, despite staff encouragement. Electronically signed by Josefa Pickard, [address] on 3/2/2021 at 12:11 PM

## 2021-03-02 NOTE — GROUP NOTE
Group Therapy Note    Date: 3/1/2021    Group Start Time: 7414  Group End Time: 1910  Group Topic: Recreational    [facility]    Nella Virgen        Group Therapy Note    Attendees: 6/16       Patient's Goal:  To participate in the group's game of Heads Up. Notes:  Pt actively participated in the Activity Group. Status After Intervention:  Unchanged    Participation Level:  Active Listener and Interactive    Participation Quality: Appropriate and Attentive      Speech:  normal      Thought Process/Content: Logical      Affective Functioning: Congruent      Mood: euthymic      Level of consciousness:  Alert and Attentive      Response to Learning: Able to verbalize current knowledge/experience      Endings: None Reported    Modes of Intervention: Activity      Discipline Responsible: [address], [email or web address]      Signature:  Nella Virgen

## 2021-03-02 NOTE — PROGRESS NOTES
Pt. attended the 0900 community meeting. Electronically signed by Mauri Vasquez, [address] on 3/2/2021 at 9:47 AM

## 2021-03-02 NOTE — FLOWSHEET NOTE
PT remains visible on unit. Seen eating meals, attending groups and socializing with select peers. Pt paranoid about another peer. Bizarre interaction between the two notes during group. PT states he doesn't trust him and no longer plans to go to group with peer there or walk unit. PT reminded staff is always available and doing rounds to ensure safety. Pt reports relief knowing this. Is preoccupied with concerns about peer. States his anxiety is \"really high\" but denies depression. Denies any SI, HI or AVH. After leaving room, pt heard stating, \"well isnt that a good scene\". Unable to make out what else patient was stating to himself. Currently sitting in day room watching television.

## 2021-03-02 NOTE — GROUP NOTE
Group Therapy Note    Date: 3/1/2021    Group Start Time: 2050  Group End Time: 2120  Group Topic: Wrap-Up    [facility]    aRdha Amor        Group Therapy Note    Attendees: 11/16       Patient's Goal:  To \"keep my stress down\" and to \"be calm. \"    Notes:  Pt reports not meeting their goal for the day. Pt chose not to participate in a guided body scan meditation. Status After Intervention:  Unchanged    Participation Level:  Active Listener and Interactive    Participation Quality: Appropriate and Attentive      Speech:  normal      Thought Process/Content: Logical      Affective Functioning: Congruent      Mood: euthymic      Level of consciousness:  Alert and Attentive      Response to Learning: Able to verbalize current knowledge/experience and Progressing to goal      Endings: None Reported    Modes of Intervention: Support      Discipline Responsible: [address], [employer]      Signature:  Radha Amor

## 2021-03-02 NOTE — GROUP NOTE
Group Therapy Note    Date: 3/2/2021    Group Start Time: 2298  Group End Time: 6866  Group Topic: Cognitive Skills    [facility]    Krystina Garnett [facility]        Group Therapy Note    Attendees: 5         Patient's Goal:  Coping skills    Notes:  Patient participated and asked the other patient if it was him he was talking about in the group.      Status After Intervention:  Improved    Participation Level: Interactive    Participation Quality: Appropriate      Speech:  normal      Thought Process/Content: Logical      Affective Functioning: Congruent      Mood: anxious      Level of consciousness:  Alert      Response to Learning: Progressing to goal      Endings: None Reported    Modes of Intervention: Support      Discipline Responsible: /Counselor      Signature:  Krystina Garnett, [facility]

## 2021-03-02 NOTE — PROGRESS NOTES
[facility] FOLLOW-UP NOTE       3/2/2021     Patient was seen and examined in person, Chart reviewed   Patient's case discussed with staff/team    Chief Complaint: Tuyet psychosis    Interim History:     Pt slept the whole night  Continue to feel paranoid  Feel that he is being held against his wish  Think that he is depressed because of his stay here  I want to sing and play music and I could not do that here  Less manic with thoughts much more organized  Has been talking with his family  Pt was thinking about Miguel Ángel Xiongnis coming to earth and putting every body at risk  Has hope for the future    Appetite:   [] Normal/Unchanged  [] Increased  [x] Decreased      Sleep:       [] Normal/Unchanged  [x] Fair       [] Poor              Energy:    [x] Normal/Unchanged  [] Increased  [] Decreased        SI [] Present  [x] Absent    HI  []Present  [x] Absent     Aggression:  [] yes  [] no    Patient is [x] able  [] unable to CONTRACT FOR SAFETY     PAST MEDICAL/PSYCHIATRIC HISTORY:   History reviewed. No pertinent past medical history.     FAMILY/SOCIAL HISTORY:  Family History   Family history unknown: Yes     Social History     Socioeconomic History    Marital status: Single     Spouse name: Not on file    Number of children: Not on file    Years of education: Not on file    Highest education level: Not on file   Occupational History    Not on file   Social Needs    Financial resource strain: Not on file    Food insecurity     Worry: Not on file     Inability: Not on file    Transportation needs     Medical: Not on file     Non-medical: Not on file   Tobacco Use    Smoking status: Current Every Day Smoker     Packs/day: 1.00     Types: Cigarettes    Smokeless tobacco: Never Used   Substance and Sexual Activity    Alcohol use: Not Currently    Drug use: Yes     Types: Marijuana    Sexual activity: Not on file     Comment: pt will not comment    Lifestyle    Physical activity Days per week: Not on file     Minutes per session: Not on file    Stress: Not on file   Relationships    Social connections     Talks on phone: Not on file     Gets together: Not on file     Attends Jain service: Not on file     Active member of club or organization: Not on file     Attends meetings of clubs or organizations: Not on file     Relationship status: Not on file    Intimate partner violence     Fear of current or ex partner: Not on file     Emotionally abused: Not on file     Physically abused: Not on file     Forced sexual activity: Not on file   Other Topics Concern    Not on file   Social History Narrative    Not on file           ROS:  [x] All negative/unchanged except if checked.  Explain positive(checked items) below:  [] Constitutional  [] Eyes  [] Ear/Nose/Mouth/Throat  [] Respiratory  [] CV  [] GI  []   [] Musculoskeletal  [] Skin/Breast  [] Neurological  [] Endocrine  [] Heme/Lymph  [] Allergic/Immunologic    Explanation:     MEDICATIONS:    Current Facility-Administered Medications:     ARIPiprazole (ABILIFY) tablet 15 mg, 15 mg, Oral, Daily, Sandy Yu MD, 15 mg at 03/02/21 0829    zolpidem (AMBIEN) tablet 10 mg, 10 mg, Oral, Nightly, Sandy Yu MD, 10 mg at 03/01/21 2052    melatonin tablet 5 mg, 5 mg, Oral, Nightly PRN, Sandy Yu MD, 5 mg at 02/28/21 2041    divalproex (DEPAKOTE) DR tablet 250 mg, 250 mg, Oral, 3 times per day, Sandy Yu MD, 250 mg at 03/02/21 9483    acetaminophen (TYLENOL) tablet 650 mg, 650 mg, Oral, Q4H PRN, Sandy Yu MD    polyethylene glycol (GLYCOLAX) packet 17 g, 17 g, Oral, Daily PRN, Sandy Yu MD    haloperidol lactate (HALDOL) injection 5 mg, 5 mg, Intramuscular, Q6H PRN **OR** haloperidol (HALDOL) tablet 5 mg, 5 mg, Oral, Q6H PRN, Sandy Yu MD LABBENZ Neg 02/24/2021    LABMETH Neg 02/24/2021    OPIATESCREENURINE Neg 02/24/2021    PHENCYCLIDINESCREENURINE Neg 02/24/2021    ETOH <10 02/24/2021     Lab Results   Component Value Date    TSH 1.970 02/24/2021     No results found for: LITHIUM  Lab Results   Component Value Date    VALPROATE 63.7 03/01/2021       Treatment Plan:  Reviewed current Medications with the patient. Medication as ordered  Depakote level - WNL  Risks, benefits, side effects, drug-to-drug interactions and alternatives to treatment were discussed. Collateral information: reviewed  CD evaluation  Encourage patient to attend group and other milieu activities.   Discharge planning discussed with the patient and treatment team.    PSYCHOTHERAPY/COUNSELING:  [x] Therapeutic interview  [x] Supportive  [] CBT  [] Ongoing  [] Other    [x] Patient continues to need, on a daily basis, active treatment furnished directly by or requiring the supervision of inpatient psychiatric personnel      Anticipated Length of stay:            Electronically signed by Diana Harden MD on 3/2/2021 at 10:33 AM

## 2021-03-02 NOTE — PROGRESS NOTES
[facility]. FOLLOW-UP NOTE       3/4/2021     Patient was seen and examined in person, Chart reviewed   Patient's case discussed with staff/team    Chief Complaint: Paranoia      Interim History:     Patient report that is starting to feel better  He appeared less manic  He is sleeping better-slept for 5 hours last night  Patient is less focused on the world coming to an end  He has been talking with his family  Less irritable and argumentative  Has been attending groups and able to focus better    Appetite:   [x] Normal/Unchanged  [] Increased  [] Decreased      Sleep:       [] Normal/Unchanged  [x] Fair       [] Poor              Energy:    [] Normal/Unchanged  [x] Increased  [] Decreased        SI [] Present  [] Absent    HI  []Present  [] Absent     Aggression: Verbal[] yes  [x] no    Patient is [x] able  [] unable to CONTRACT FOR SAFETY     PAST MEDICAL/PSYCHIATRIC HISTORY:   History reviewed. No pertinent past medical history.     FAMILY/SOCIAL HISTORY:  Family History   Family history unknown: Yes     Social History     Socioeconomic History    Marital status: Single     Spouse name: Not on file    Number of children: Not on file    Years of education: Not on file    Highest education level: Not on file   Occupational History    Not on file   Social Needs    Financial resource strain: Not on file    Food insecurity     Worry: Not on file     Inability: Not on file    Transportation needs     Medical: Not on file     Non-medical: Not on file   Tobacco Use    Smoking status: Current Every Day Smoker     Packs/day: 1.00     Types: Cigarettes    Smokeless tobacco: Never Used   Substance and Sexual Activity    Alcohol use: Not Currently    Drug use: Yes     Types: Marijuana    Sexual activity: Not on file     Comment: pt will not comment    Lifestyle    Physical activity     Days per week: Not on file     Minutes per session: Not on file    Stress: Not on file Relationships    Social connections     Talks on phone: Not on file     Gets together: Not on file     Attends Yazdanism service: Not on file     Active member of club or organization: Not on file     Attends meetings of clubs or organizations: Not on file     Relationship status: Not on file    Intimate partner violence     Fear of current or ex partner: Not on file     Emotionally abused: Not on file     Physically abused: Not on file     Forced sexual activity: Not on file   Other Topics Concern    Not on file   Social History Narrative    Not on file           ROS:  [x] All negative/unchanged except if checked.  Explain positive(checked items) below:  [] Constitutional  [] Eyes  [] Ear/Nose/Mouth/Throat  [] Respiratory  [] CV  [] GI  []   [] Musculoskeletal  [] Skin/Breast  [] Neurological  [] Endocrine  [] Heme/Lymph  [] Allergic/Immunologic    Explanation:     MEDICATIONS:    Current Facility-Administered Medications:     ARIPiprazole (ABILIFY) tablet 15 mg, 15 mg, Oral, Daily, Pamela Hawkins MD, 15 mg at 03/04/21 0924    zolpidem (AMBIEN) tablet 10 mg, 10 mg, Oral, Nightly, Pamela Hawkins MD, 10 mg at 03/03/21 2105    melatonin tablet 5 mg, 5 mg, Oral, Nightly PRN, Pamela Hawkins MD, 5 mg at 02/28/21 2041    divalproex (DEPAKOTE) DR tablet 250 mg, 250 mg, Oral, 3 times per day, Pamela Hawkins MD, 250 mg at 03/04/21 1350    acetaminophen (TYLENOL) tablet 650 mg, 650 mg, Oral, Q4H PRN, Pamela Hawkins MD    polyethylene glycol (GLYCOLAX) packet 17 g, 17 g, Oral, Daily PRN, Pamela Hawkins MD    haloperidol lactate (HALDOL) injection 5 mg, 5 mg, Intramuscular, Q6H PRN **OR** haloperidol (HALDOL) tablet 5 mg, 5 mg, Oral, Q6H PRN, Pamela Hawkins MD    hydrOXYzine (VISTARIL) injection 50 mg, 50 mg, Intramuscular, Q6H PRN **OR** hydrOXYzine (VISTARIL) capsule 50 mg, 50 mg, Oral, Q6H PRN, Pamela Hawkins MD, 50 mg at 02/27/21 0050 H/H 6.4/ 18.8, WBC 0.82, plt 8   nicotine polacrilex (NICORETTE) gum 2 mg, 2 mg, Oral, PRN, Jhoana Berumen MD, 2 mg at 03/04/21 1250    LORazepam (ATIVAN) tablet 1 mg, 1 mg, Oral, Q6H PRN, 1 mg at 02/26/21 0415 **OR** LORazepam (ATIVAN) injection 1 mg, 1 mg, Intramuscular, Q6H PRN, Jhoana Berumen MD    influenza quadrivalent split vaccine (FLUZONE;FLUARIX;FLULAVAL;AFLURIA) injection 0.5 mL, 0.5 mL, Intramuscular, Prior to discharge, Jhoana Berumen MD      Examination:  /85   Pulse 116   Temp 98 °F (36.7 °C) (Oral)   Resp 20   Ht 5' 9\" (1.753 m)   Wt 140 lb (63.5 kg)   SpO2 96%   BMI 20.67 kg/m²   Gait - steady  Medication side effects(SE): no    Mental Status Examination:    Level of consciousness:  within normal limits   Appearance:  poor grooming and poor hygiene  Behavior/Motor: less  psychomotor agitation  Attitude toward examiner:  playful  Speech:  rapid   Mood: irritable  Affect:  mood congruent  Thought processes:  rapid   Thought content:  Delusions: less paranoid  Cognition:  oriented to person, place, and time   Concentration better  Insight poor   Judgement poor     ASSESSMENT:   Patient symptoms are:  [] Well controlled  [x] Improving  [] Worsening  [] No change      Diagnosis:   Active Problems:    Schizoaffective disorder, bipolar type ([facility] [address]  Resolved Problems:    * No resolved hospital problems. *      LABS:    No results for input(s): WBC, HGB, PLT in the last 72 hours. No results for input(s): NA, K, CL, CO2, BUN, CREATININE, GLUCOSE in the last 72 hours. No results for input(s): BILITOT, ALKPHOS, AST, ALT in the last 72 hours.   Lab Results   Component Value Date    LABAMPH Neg 02/24/2021    BARBSCNU Neg 02/24/2021    LABBENZ Neg 02/24/2021    LABMETH Neg 02/24/2021    OPIATESCREENURINE Neg 02/24/2021    PHENCYCLIDINESCREENURINE Neg 02/24/2021    ETOH <10 02/24/2021     Lab Results   Component Value Date    TSH 1.970 02/24/2021     No results found for: LITHIUM  Lab Results   Component Value Date

## 2021-03-02 NOTE — GROUP NOTE
Group Therapy Note    Date: 3/1/2021    Group Start Time: 1000  Group End Time: 1050  Group Topic: Psychotherapy    [facility]    Rashmi Quiroz, [facility]        Group Therapy Note    Attendees:4         Patient's Goal: to sleep all night    Notes:  Patient stated he does not want to call work but will need a RTW letter    Status After Intervention:  Improved    Participation Level: Minimal    Participation Quality: Attentive      Speech:  normal      Thought Process/Content: Logical      Affective Functioning: Flat      Mood: anxious      Level of consciousness:  Alert      Response to Learning: Progressing to goal      Endings: None Reported    Modes of Intervention: Support      Discipline Responsible: /Counselor      Signature:  Cece Shaver, [facility]

## 2021-03-03 PROCEDURE — 90833 PSYTX W PT W E/M 30 MIN: CPT | Performed by: PSYCHIATRY & NEUROLOGY

## 2021-03-03 PROCEDURE — 99232 SBSQ HOSP IP/OBS MODERATE 35: CPT | Performed by: PSYCHIATRY & NEUROLOGY

## 2021-03-03 PROCEDURE — 1240000000 HC EMOTIONAL WELLNESS R&B

## 2021-03-03 PROCEDURE — 6370000000 HC RX 637 (ALT 250 FOR IP): Performed by: PSYCHIATRY & NEUROLOGY

## 2021-03-03 RX ADMIN — ARIPIPRAZOLE 15 MG: 15 TABLET ORAL at 09:03

## 2021-03-03 RX ADMIN — NICOTINE POLACRILEX 2 MG: 2 GUM, CHEWING BUCCAL at 14:57

## 2021-03-03 RX ADMIN — DIVALPROEX SODIUM 250 MG: 250 TABLET, DELAYED RELEASE ORAL at 06:22

## 2021-03-03 RX ADMIN — DIVALPROEX SODIUM 250 MG: 250 TABLET, DELAYED RELEASE ORAL at 14:34

## 2021-03-03 RX ADMIN — ZOLPIDEM TARTRATE 10 MG: 5 TABLET ORAL at 21:05

## 2021-03-03 RX ADMIN — DIVALPROEX SODIUM 250 MG: 250 TABLET, DELAYED RELEASE ORAL at 21:05

## 2021-03-03 RX ADMIN — NICOTINE POLACRILEX 2 MG: 2 GUM, CHEWING BUCCAL at 09:59

## 2021-03-03 RX ADMIN — NICOTINE POLACRILEX 2 MG: 2 GUM, CHEWING BUCCAL at 19:02

## 2021-03-03 NOTE — PROGRESS NOTES
Pt. attended the 0900 community meeting.  Electronically signed by Sophie Sarkar on 3/3/2021 at 9:49 AM

## 2021-03-03 NOTE — PROGRESS NOTES
[facility]. FOLLOW-UP NOTE       3/3/2021     Patient was seen and examined in person, Chart reviewed   Patient's case discussed with staff/team    Chief Complaint: Tuyet psychosis    Interim History:     Pt slept for 3 hour last night  Pt lack insight and judgment   Less manic and psychotic  Less FOI with less pressured speech  Less paranoid about world coming to end    Appetite:   [] Normal/Unchanged  [] Increased  [x] Decreased      Sleep:       [] Normal/Unchanged  [x] Fair       [] Poor              Energy:    [x] Normal/Unchanged  [] Increased  [] Decreased        SI [] Present  [x] Absent    HI  []Present  [x] Absent     Aggression:  [] yes  [] no    Patient is [x] able  [] unable to CONTRACT FOR SAFETY     PAST MEDICAL/PSYCHIATRIC HISTORY:   History reviewed. No pertinent past medical history.     FAMILY/SOCIAL HISTORY:  Family History   Family history unknown: Yes     Social History     Socioeconomic History    Marital status: Single     Spouse name: Not on file    Number of children: Not on file    Years of education: Not on file    Highest education level: Not on file   Occupational History    Not on file   Social Needs    Financial resource strain: Not on file    Food insecurity     Worry: Not on file     Inability: Not on file    Transportation needs     Medical: Not on file     Non-medical: Not on file   Tobacco Use    Smoking status: Current Every Day Smoker     Packs/day: 1.00     Types: Cigarettes    Smokeless tobacco: Never Used   Substance and Sexual Activity    Alcohol use: Not Currently    Drug use: Yes     Types: Marijuana    Sexual activity: Not on file     Comment: pt will not comment    Lifestyle    Physical activity     Days per week: Not on file     Minutes per session: Not on file    Stress: Not on file   Relationships    Social connections     Talks on phone: Not on file     Gets together: Not on file Attends Yazdanism service: Not on file     Active member of club or organization: Not on file     Attends meetings of clubs or organizations: Not on file     Relationship status: Not on file    Intimate partner violence     Fear of current or ex partner: Not on file     Emotionally abused: Not on file     Physically abused: Not on file     Forced sexual activity: Not on file   Other Topics Concern    Not on file   Social History Narrative    Not on file           ROS:  [x] All negative/unchanged except if checked.  Explain positive(checked items) below:  [] Constitutional  [] Eyes  [] Ear/Nose/Mouth/Throat  [] Respiratory  [] CV  [] GI  []   [] Musculoskeletal  [] Skin/Breast  [] Neurological  [] Endocrine  [] Heme/Lymph  [] Allergic/Immunologic    Explanation:     MEDICATIONS:    Current Facility-Administered Medications:     ARIPiprazole (ABILIFY) tablet 15 mg, 15 mg, Oral, Daily, Unruly Wayne MD, 15 mg at 03/03/21 0903    zolpidem (AMBIEN) tablet 10 mg, 10 mg, Oral, Nightly, Unruly Wayne MD, 10 mg at 03/02/21 2110    melatonin tablet 5 mg, 5 mg, Oral, Nightly PRN, Unruly Wayne MD, 5 mg at 02/28/21 2041    divalproex (DEPAKOTE) DR tablet 250 mg, 250 mg, Oral, 3 times per day, Unruly Wayne MD, 250 mg at 03/03/21 0622    acetaminophen (TYLENOL) tablet 650 mg, 650 mg, Oral, Q4H PRN, Unruly Wayne MD    polyethylene glycol (GLYCOLAX) packet 17 g, 17 g, Oral, Daily PRN, Unruly Wayne MD    haloperidol lactate (HALDOL) injection 5 mg, 5 mg, Intramuscular, Q6H PRN **OR** haloperidol (HALDOL) tablet 5 mg, 5 mg, Oral, Q6H PRN, Unruly Wayne MD    hydrOXYzine (VISTARIL) injection 50 mg, 50 mg, Intramuscular, Q6H PRN **OR** hydrOXYzine (VISTARIL) capsule 50 mg, 50 mg, Oral, Q6H PRN, Unruly Wayne MD, 50 mg at 02/27/21 0050    nicotine polacrilex (NICORETTE) gum 2 mg, 2 mg, Oral, PRN, Unruly Wayne MD, 2 mg at 03/03/21 0735   LORazepam (ATIVAN) tablet 1 mg, 1 mg, Oral, Q6H PRN, 1 mg at 02/26/21 0415 **OR** LORazepam (ATIVAN) injection 1 mg, 1 mg, Intramuscular, Q6H PRN, April Ji MD    influenza quadrivalent split vaccine (FLUZONE;FLUARIX;FLULAVAL;AFLURIA) injection 0.5 mL, 0.5 mL, Intramuscular, Prior to discharge, April Ji MD      Examination:  /85   Pulse 116   Temp 97 °F (36.1 °C) (Oral)   Resp 20   Ht 5' 9\" (1.753 m)   Wt 140 lb (63.5 kg)   SpO2 96%   BMI 20.67 kg/m²   Gait - steady  Medication side effects(SE): no    Mental Status Examination:    Level of consciousness:  within normal limits   Appearance:  fair grooming and fair hygiene  Behavior/Motor:  no abnormalities noted  Attitude toward examiner:  evasive  Speech:  rapid   Mood: less irritable and labile  Affect:  mood congruent  Thought processes:  goal directed   Thought content:  Delusions:  Less paranoid  Perceptual Disturbance:  denies any perceptual disturbance  Cognition:  oriented to person, place, and time   Concentration distractible  Insight poor   Judgement poor     ASSESSMENT:   Patient symptoms are:  [] Well controlled  [] Improving  [] Worsening  [x] No change      Diagnosis:   Active Problems:    Schizoaffective disorder, [facility] ([facility]  Resolved Problems:    * No resolved hospital problems. *      LABS:    No results for input(s): WBC, HGB, PLT in the last 72 hours. No results for input(s): NA, K, CL, CO2, BUN, CREATININE, GLUCOSE in the last 72 hours. No results for input(s): BILITOT, ALKPHOS, AST, ALT in the last 72 hours.   Lab Results   Component Value Date    LABAMPH Neg 02/24/2021    BARBSCNU Neg 02/24/2021    LABBENZ Neg 02/24/2021    LABMETH Neg 02/24/2021    OPIATESCREENURINE Neg 02/24/2021    PHENCYCLIDINESCREENURINE Neg 02/24/2021    ETOH <10 02/24/2021     Lab Results   Component Value Date    TSH 1.970 02/24/2021     No results found for: LITHIUM  Lab Results   Component Value Date VALPROATE 63.7 03/01/2021       Treatment Plan:  Reviewed current Medications with the patient. Medication as ordered  Depakote level - WNL  Risks, benefits, side effects, drug-to-drug interactions and alternatives to treatment were discussed. Collateral information: reviewed  CD evaluation  Encourage patient to attend group and other milieu activities.   Discharge planning discussed with the patient and treatment team.    PSYCHOTHERAPY/COUNSELING:  [x] Therapeutic interview  [x] Supportive  [] CBT  [] Ongoing  [] Other  Patient was seen 1:1 for 20 minutes, other than E&M time spent, focusing on      - coping skills techniques     - Anxiety management techniques discussed including deep breathing exercise and PMR     - discussing patients strength and weakness      - Insight oriented approach     - Focusing on negative cognition and maladaptive thoughts, which is feeding and maintaining the depression symptoms       [x] Patient continues to need, on a daily basis, active treatment furnished directly by or requiring the supervision of inpatient psychiatric personnel      Anticipated Length of stay:            Electronically signed by Azul Seals MD on 3/3/2021 at 10:53 AM

## 2021-03-03 NOTE — GROUP NOTE
Group Therapy Note    Date: 3/3/2021    Group Start Time: 1100  Group End Time: 1200  Group Topic: Psychotherapy    [facility]    Kaity Berrios, [facility]        Group Therapy Note    Attendees: 9         Patient's Goal: to be discharged this Friday the latest.     Notes:  Patient stated he is forced to take his meds    Status After Intervention:  Unchanged    Participation Level: Interactive    Participation Quality: Attentive      Speech:  normal      Thought Process/Content: Logical      Affective Functioning: Flat      Mood: anxious      Level of consciousness:  Alert      Response to Learning: Progressing to goal      Endings: None Reported    Modes of Intervention: Support      Discipline Responsible: /Counselor      Signature:  Kaity Berrios, [facility]

## 2021-03-03 NOTE — PROGRESS NOTES
Patient did not attend group despite staff encouragement.   Electronically signed by Juli Brown on 3/2/2021 at 9:16 PM

## 2021-03-04 VITALS
DIASTOLIC BLOOD PRESSURE: 80 MMHG | RESPIRATION RATE: 20 BRPM | OXYGEN SATURATION: 99 % | SYSTOLIC BLOOD PRESSURE: 130 MMHG | WEIGHT: 140 LBS | TEMPERATURE: 98 F | BODY MASS INDEX: 20.73 KG/M2 | HEIGHT: 69 IN | HEART RATE: 90 BPM

## 2021-03-04 PROCEDURE — 6370000000 HC RX 637 (ALT 250 FOR IP): Performed by: PSYCHIATRY & NEUROLOGY

## 2021-03-04 PROCEDURE — 1240000000 HC EMOTIONAL WELLNESS R&B

## 2021-03-04 PROCEDURE — 99232 SBSQ HOSP IP/OBS MODERATE 35: CPT | Performed by: PSYCHIATRY & NEUROLOGY

## 2021-03-04 RX ADMIN — DIVALPROEX SODIUM 250 MG: 250 TABLET, DELAYED RELEASE ORAL at 20:46

## 2021-03-04 RX ADMIN — ARIPIPRAZOLE 15 MG: 15 TABLET ORAL at 09:24

## 2021-03-04 RX ADMIN — ZOLPIDEM TARTRATE 10 MG: 5 TABLET ORAL at 20:46

## 2021-03-04 RX ADMIN — DIVALPROEX SODIUM 250 MG: 250 TABLET, DELAYED RELEASE ORAL at 13:50

## 2021-03-04 RX ADMIN — DIVALPROEX SODIUM 250 MG: 250 TABLET, DELAYED RELEASE ORAL at 06:12

## 2021-03-04 RX ADMIN — NICOTINE POLACRILEX 2 MG: 2 GUM, CHEWING BUCCAL at 20:46

## 2021-03-04 RX ADMIN — NICOTINE POLACRILEX 2 MG: 2 GUM, CHEWING BUCCAL at 12:50

## 2021-03-04 NOTE — GROUP NOTE
Group Therapy Note    Date: 3/4/2021    Group Start Time: 0145  Group End Time: 1906  Group Topic: Psychotherapy    [facility]    MIKEY Coughlin        Group Therapy Note    Attendees: 6         Patient's Goal:  To participate in progressive muscle relaxation     Notes:  Engaged and reports liking the prompts     Status After Intervention:  Improved    Participation Level:  Active Listener    Participation Quality: Appropriate, Attentive, Sharing and Supportive      Speech:  normal      Thought Process/Content: Delusional      Affective Functioning: Flat      Mood: depressed      Level of consciousness:  Alert      Response to Learning: Able to verbalize current knowledge/experience      Endings: None Reported    Modes of Intervention: Education      Discipline Responsible: /Counselor      Signature:  MIKEY Coughlin - - -

## 2021-03-04 NOTE — PROGRESS NOTES
Pt out sitting in day room social with peers, this nurse approached pt about talking and obtaining physical exam, pt agreed, pt calm with the first few questions then pt reports \" this is fucking hell being here I just want to play my guitar and sing, I am sick of this shit\", pt noted to be paranoid, reports \" I am an  and I need to get back to it, I will take my meds when I get home\", pt has irritable edge, reports anxiety 7/10, depression 3/10.

## 2021-03-04 NOTE — PROGRESS NOTES
Patient attended evening goal wrap up group. States he was able to meet his goal for today to keep his sanity.

## 2021-03-04 NOTE — PROGRESS NOTES
[facility] FOLLOW-UP NOTE       3/4/2021     Patient was seen and examined in person, Chart reviewed   Patient's case discussed with staff/team    Chief Complaint: Tuyet psychosis    Interim History:     Pt continued to make progress  He is less manic and paranoid  Has been tolerating the medications quite well although reluctantly  Emphasized the importance of taking medication after discharge to which he agrees  Patient is focused on discharge  He denies any suicidal thoughts or homicidal thoughts  He denies any side effects from the medication    Appetite:   [] Normal/Unchanged  [] Increased  [x] Decreased      Sleep:       [] Normal/Unchanged  [x] Fair       [] Poor              Energy:    [x] Normal/Unchanged  [] Increased  [] Decreased        SI [] Present  [x] Absent    HI  []Present  [x] Absent     Aggression:  [] yes  [] no    Patient is [x] able  [] unable to CONTRACT FOR SAFETY     PAST MEDICAL/PSYCHIATRIC HISTORY:   History reviewed. No pertinent past medical history.     FAMILY/SOCIAL HISTORY:  Family History   Family history unknown: Yes     Social History     Socioeconomic History    Marital status: Single     Spouse name: Not on file    Number of children: Not on file    Years of education: Not on file    Highest education level: Not on file   Occupational History    Not on file   Social Needs    Financial resource strain: Not on file    Food insecurity     Worry: Not on file     Inability: Not on file    Transportation needs     Medical: Not on file     Non-medical: Not on file   Tobacco Use    Smoking status: Current Every Day Smoker     Packs/day: 1.00     Types: Cigarettes    Smokeless tobacco: Never Used   Substance and Sexual Activity    Alcohol use: Not Currently    Drug use: Yes     Types: Marijuana    Sexual activity: Not on file     Comment: pt will not comment    Lifestyle    Physical activity     Days per week: Not on file Minutes per session: Not on file    Stress: Not on file   Relationships    Social connections     Talks on phone: Not on file     Gets together: Not on file     Attends Cheondoism service: Not on file     Active member of club or organization: Not on file     Attends meetings of clubs or organizations: Not on file     Relationship status: Not on file    Intimate partner violence     Fear of current or ex partner: Not on file     Emotionally abused: Not on file     Physically abused: Not on file     Forced sexual activity: Not on file   Other Topics Concern    Not on file   Social History Narrative    Not on file           ROS:  [x] All negative/unchanged except if checked.  Explain positive(checked items) below:  [] Constitutional  [] Eyes  [] Ear/Nose/Mouth/Throat  [] Respiratory  [] CV  [] GI  []   [] Musculoskeletal  [] Skin/Breast  [] Neurological  [] Endocrine  [] Heme/Lymph  [] Allergic/Immunologic    Explanation:     MEDICATIONS:    Current Facility-Administered Medications:     ARIPiprazole (ABILIFY) tablet 15 mg, 15 mg, Oral, Daily, Ephraim Bence, MD, 15 mg at 03/04/21 0924    zolpidem (AMBIEN) tablet 10 mg, 10 mg, Oral, Nightly, Ephraim Bence, MD, 10 mg at 03/03/21 2105    melatonin tablet 5 mg, 5 mg, Oral, Nightly PRN, Ephraim Bence, MD, 5 mg at 02/28/21 2041    divalproex (DEPAKOTE) DR tablet 250 mg, 250 mg, Oral, 3 times per day, Ephraim Bence, MD, 250 mg at 03/04/21 1350    acetaminophen (TYLENOL) tablet 650 mg, 650 mg, Oral, Q4H PRN, Ephraim Bence, MD    polyethylene glycol (GLYCOLAX) packet 17 g, 17 g, Oral, Daily PRN, Ephraim Bence, MD    haloperidol lactate (HALDOL) injection 5 mg, 5 mg, Intramuscular, Q6H PRN **OR** haloperidol (HALDOL) tablet 5 mg, 5 mg, Oral, Q6H PRN, Ephraim Bence, MD   hydrOXYzine (VISTARIL) injection 50 mg, 50 mg, Intramuscular, Q6H PRN **OR** hydrOXYzine (VISTARIL) capsule 50 mg, 50 mg, Oral, Q6H PRN, Pamela Hawkins MD, 50 mg at 02/27/21 0050    nicotine polacrilex (NICORETTE) gum 2 mg, 2 mg, Oral, PRN, Pamela Hawkins MD, 2 mg at 03/04/21 1250    LORazepam (ATIVAN) tablet 1 mg, 1 mg, Oral, Q6H PRN, 1 mg at 02/26/21 0415 **OR** LORazepam (ATIVAN) injection 1 mg, 1 mg, Intramuscular, Q6H PRN, Pamela Hawkins MD    influenza quadrivalent split vaccine (FLUZONE;FLUARIX;FLULAVAL;AFLURIA) injection 0.5 mL, 0.5 mL, Intramuscular, Prior to discharge, Pamela Hawkins MD      Examination:  /85   Pulse 116   Temp 98 °F (36.7 °C) (Oral)   Resp 20   Ht 5' 9\" (1.753 m)   Wt 140 lb (63.5 kg)   SpO2 96%   BMI 20.67 kg/m²   Gait - steady  Medication side effects(SE): no    Mental Status Examination:    Level of consciousness:  within normal limits   Appearance:  fair grooming and fair hygiene  Behavior/Motor:  no abnormalities noted  Attitude toward examiner:  evasive  Speech:  rapid   Mood: less irritable and labile  Affect:  mood congruent  Thought processes:  goal directed   Thought content:  Delusions:  Less paranoid  Perceptual Disturbance:  denies any perceptual disturbance  Cognition:  oriented to person, place, and time   Concentration distractible  Insight poor   Judgement poor     ASSESSMENT:   Patient symptoms are:  [] Well controlled  [x] Improving  [] Worsening  [] No change      Diagnosis:   Active Problems:    Schizoaffective disorder, bipolar type ([facility]  Resolved Problems:    * No resolved hospital problems. *      LABS:    No results for input(s): WBC, HGB, PLT in the last 72 hours. No results for input(s): NA, K, CL, CO2, BUN, CREATININE, GLUCOSE in the last 72 hours. No results for input(s): BILITOT, ALKPHOS, AST, ALT in the last 72 hours.   Lab Results   Component Value Date    LABAMPH Neg 02/24/2021    DIMITRI Neg 02/24/2021

## 2021-03-04 NOTE — PROGRESS NOTES
Pt out social with staff and peers, pt noted he is anxious to leave, wants to enjoy life on the \"outside:, pt denies [address], pt reports most of his anxiety is r/t being here for so long.

## 2021-03-04 NOTE — GROUP NOTE
Group Therapy Note    Date: 3/4/2021    Group Start Time: 1430  Group End Time: 1500  Group Topic: Healthy Living/Wellness    [facility]    Alphonsa Carrel, RN      Group Therapy Not  Attendees: 8/14         Patient's Goal: medication management, to stay on medication once discharged      Status After Intervention:  Improved    Participation Level:  Active Listener and Interactive    Participation Quality: Appropriate and Sharing      Speech:  pressured      Thought Process/Content: Logical      Affective Functioning: Congruent      Mood: euthymic      Level of consciousness:  Alert      Response to Learning: Able to verbalize current knowledge/experience and Able to retain information      Endings: None Reported    Modes of Intervention: Problem-solving      Discipline Responsible: Registered Nurse      Signature:  Alphonsa Carrel, RN

## 2021-03-04 NOTE — GROUP NOTE
Group Therapy Note    Date: 3/4/2021    Group Start Time: 1406  Group End Time: 2680  Group Topic: Healthy Living/Wellness    [facility]    Sylvester Banks LPN        Group Therapy Note    Attendees: 6/14         Patient's Goal: Promote social skills    Notes: Patient voiced humor will promote skills    Status After Intervention:  Improved    Participation Level:  Active Listener    Participation Quality: Attentive      Speech:  normal      Thought Process/Content: Logical      Affective Functioning: Congruent      Mood: euthymic      Level of consciousness:  Alert      Response to Learning: Able to verbalize/acknowledge new learning      Endings: None Reported    Modes of Intervention: Support      Discipline Responsible: Licensed Practical Nurse      Signature:  Sylvester Banks LPN

## 2021-03-04 NOTE — PROGRESS NOTES
Pt. attended the 0900 community meeting.  Electronically signed by Steve Langston on 3/4/2021 at 11:50 AM

## 2021-03-05 PROCEDURE — 99239 HOSP IP/OBS DSCHRG MGMT >30: CPT | Performed by: PSYCHIATRY & NEUROLOGY

## 2021-03-05 PROCEDURE — 6370000000 HC RX 637 (ALT 250 FOR IP): Performed by: PSYCHIATRY & NEUROLOGY

## 2021-03-05 RX ORDER — ZOLPIDEM TARTRATE 10 MG/1
10 TABLET ORAL NIGHTLY
Qty: 14 TABLET | Refills: 1 | Status: SHIPPED | OUTPATIENT
Start: 2021-03-05 | End: 2021-03-19

## 2021-03-05 RX ORDER — DIVALPROEX SODIUM 250 MG/1
TABLET, DELAYED RELEASE ORAL
Qty: 45 TABLET | Refills: 2 | Status: SHIPPED | OUTPATIENT
Start: 2021-03-05

## 2021-03-05 RX ORDER — ARIPIPRAZOLE 15 MG/1
15 TABLET ORAL DAILY
Qty: 30 TABLET | Refills: 2 | Status: SHIPPED | OUTPATIENT
Start: 2021-03-06

## 2021-03-05 RX ADMIN — DIVALPROEX SODIUM 250 MG: 250 TABLET, DELAYED RELEASE ORAL at 06:18

## 2021-03-05 RX ADMIN — NICOTINE POLACRILEX 2 MG: 2 GUM, CHEWING BUCCAL at 09:26

## 2021-03-05 RX ADMIN — ARIPIPRAZOLE 15 MG: 15 TABLET ORAL at 08:54

## 2021-03-05 NOTE — GROUP NOTE
Group Therapy Note    Date: 3/4/2021    Group Start Time: 1836  Group End Time: 1910  Group Topic: Recreational    [facility]    Lesly Soliman LPN        Group Therapy Note    Attendees:  9/14         Patient's Goal: Take medication    Notes: Patient voiced he took his medication today, excited possible discharge tomorrow    Status After Intervention:  Improved    Participation Level: Interactive    Participation Quality: Appropriate      Speech:  normal      Thought Process/Content: Logical      Affective Functioning: Congruent      Mood: elevated      Level of consciousness:  Alert      Response to Learning: Able to verbalize current knowledge/experience      Endings: None Reported    Modes of Intervention: Support      Discipline Responsible: Licensed Practical Nurse      Signature:  Lesly Soliman LPN

## 2021-03-05 NOTE — GROUP NOTE
Group Therapy Note    Date: 3/5/2021    Group Start Time: 1105  Group End Time: 1200  Group Topic: Psychotherapy    [facility] RONNIE Maher, [facility]        Group Therapy Note    Attendees: 8         Patient's Goal:  To go home    Notes:  Patient is being discharged today    Status After Intervention:  Improved    Participation Level: Interactive    Participation Quality: Appropriate      Speech:  normal      Thought Process/Content: Logical      Affective Functioning: Congruent      Mood: anxious      Level of consciousness:  Alert      Response to Learning: Progressing to goal      Endings: None Reported    Modes of Intervention: Support      Discipline Responsible: /Counselor      Signature:  Ramírez Maher, [facility]

## 2021-03-05 NOTE — DISCHARGE SUMMARY
DISCHARGE SUMMARY      Patient ID:  Annabel Kraft  [id number]  45 y.o.  1995      Admit date: 2/24/2021    Discharge date and time: 3/5/2021    Admitting Physician: Beth Clark MD     Discharge Physician: Dr Ezzie Gottron MD    Admission Diagnoses: Schizoaffective disorder, bipolar type ([facility] [address] [F25.0]    Admission Condition: poor    Discharged Condition: stable    Admission Circumstance:     ER report:  32year old male presents to ED via his parents. Parents complaint is of non-compliance with his medications. He is highly manic and delusional. He lives with his parents, and they are unable to manage his care. Patient presents highly manic, constant rapid and pressured speech. Completely disorganized in thought process. Patient speech and responses are not in context to topics in conversation. Patient appears highly dishevled, and is extremely thin. Patient family reports he barely eats or sleep. Patinet family reports non-compliance for many months. Patient is convinced he can cure himself with rest and diet. Denies any HI or SI. Denies any mental illness. Blames others for his problems. Seems to understand he is manic, but delusionally blames it on his delusions         During interview:     Pt was last admitted during Jan 2020. Was seeing [facility] psychiatry staff. Quit seeing cely during summer when he had to relocate for his work. Quit taking medication. Pt thought that the world is ending and people are chasing him with the knife at this back  If he fall off to sleep then he will not wake up alive, be dead.   So not sleeping for few days  Has been manic with hyperactive behavior and grandiose thinking  Rapid pressured speech, with irritability and anger issues  Has been getting impulsivity  Pt showed his natural birth celeste on his back, and he think he is a \" star child\"  He has been getting odd ideas and strange connections  Telling that they have a \"knife to my back\" - it is a metaphor and not real  I always talk metaphorically     Mom collateral:     Spoke with Parents Wily Rene and Susan Cartagena [phone] after parents gave HIPAA code. Parents think pt is suffering from anxiety and depression, from being around a lot of people this is when they notice pt gets most anxious. Pt started a job and he seems to enjoy it. Parents state job is at [employer] in [place]. Parents state that the job is low paying however, is like a apenticeship where pt is learning a lot. Mom is concerened for him to keep job due to admission and is currently calling off from him.  Mother states she is vague when calling off for pt because she feels \"it is not her business to tell them that pt is here for mental health\". Mother states all of past instances from mental health breaks have been in the winter. Parents are wondering if there is some sort of correlation with a seasonal disorder. Mother states she notices pt easily get agitated and anxious when around big groups of people. Parents state they think this has become worse since COVID hit due to isolating to the house. Parents state they were in [place] on Monday and pt called them and said he felt anxious and sad and asked them to come home. Parents states they \"got the first flight home on Tuesday and were home by Tuesday night\". Mom states pt has not been sleeping or eating and that she has been not only trying to encourage him to eat and drink, but also giving different options on food choices. Mom states Kavita Howe has not eaten good in a few days and has lost weight\". Additionally, mom states she has only gotten him to fall asleep by giving pt melatonin and even then pt is only sleeping a \"few hours\". Mother and father state that they are sacred for pt and concerned for his physical and mental wellbeing. Mom states that yesterday she got pt into see his PCP with much encouragement for him to go.  Per mom \"PCP immediately suggested that parents take pt to the ED for psychiatric treatment\". Mom states \"I sternly talked to Leeann and told him that he was having a psychotic break and that the only way it was going to get better was if he went to get help\". Mom states \"it was like a light turned on in his head and he was willing to come to the ED with me\". Parents report being scared that pt will \"hate\" them for helping him to get admitted because the last time that he was inpatient, pt was living with them, however, not talking to them due to being mad about them helping him to get psychiatric help. Mother states she \"just wants him to know that they care, and just wants to get him help\".      Stress: yes there are stress in my life but it is hard to point it down     Medications Prior to Admission:     Prescriptions Prior to Admission   Medications Prior to Admission: divalproex (DEPAKOTE) 250 MG DR tablet, 1 tab qam and 2 tab qhs  paliperidone (INVEGA) 6 MG extended release tablet, Take 1 tablet by mouth daily  paliperidone palmitate ER (INVEGA SUSTENNA) 117 MG/0.75ML GHANSHYAM IM injection, Inject 117 mg into the muscle every 30 days        Compliance:no     Psychiatric Review of Systems       Depression: no     Ann or Hypomania:  yes     Panic Attacks:  no     Phobias:  no     Obsessions and Compulsions:  no     PTSD : no     Hallucinations:  yes      Delusions:  yes      Substance Abuse History:  ETOH: no   Marijuana: yes -  THC  Opiates: no  Other Drugs: no        Past Psychiatric History:  Prior Diagnosis:  Schizoaff disorder ann  Psychiatrist: cely in the past - but not since last summer  Therapist:no  Hospitalization: yes  Hx of Suicidal Attempts: no  Hx of violence:  no  ECT: no        PAST MEDICAL/PSYCHIATRIC HISTORY:   History reviewed. No pertinent past medical history.     FAMILY/SOCIAL HISTORY:  Family History   Family history unknown: Yes     Social History     Socioeconomic History    Marital status: Single     Spouse name: Not on file    Number of children: Not on file    Years of education: Not on file    Highest education level: Not on file   Occupational History    Not on file   Social Needs    Financial resource strain: Not on file    Food insecurity     Worry: Not on file     Inability: Not on file    Transportation needs     Medical: Not on file     Non-medical: Not on file   Tobacco Use    Smoking status: Current Every Day Smoker     Packs/day: 1.00     Types: Cigarettes    Smokeless tobacco: Never Used   Substance and Sexual Activity    Alcohol use: Not Currently    Drug use: Yes     Types: Marijuana    Sexual activity: Not on file     Comment: pt will not comment    Lifestyle    Physical activity     Days per week: Not on file     Minutes per session: Not on file    Stress: Not on file   Relationships    Social connections     Talks on phone: Not on file     Gets together: Not on file     Attends Muslim service: Not on file     Active member of club or organization: Not on file     Attends meetings of clubs or organizations: Not on file     Relationship status: Not on file    Intimate partner violence     Fear of current or ex partner: Not on file     Emotionally abused: Not on file     Physically abused: Not on file     Forced sexual activity: Not on file   Other Topics Concern    Not on file   Social History Narrative    Not on file       MEDICATIONS:    Current Facility-Administered Medications:     ARIPiprazole (ABILIFY) tablet 15 mg, 15 mg, Oral, Daily, Tavia Hernandez MD, 15 mg at 03/05/21 0854    zolpidem (AMBIEN) tablet 10 mg, 10 mg, Oral, Nightly, Tavia Hernandez MD, 10 mg at 03/04/21 2046    melatonin tablet 5 mg, 5 mg, Oral, Nightly PRN, Tavia Hernandez MD, 5 mg at 02/28/21 2041    divalproex (DEPAKOTE) DR tablet 250 mg, 250 mg, Oral, 3 times per day, Tavia Hernandez MD, 250 mg at 03/05/21 0618    acetaminophen (TYLENOL) tablet 650 mg, 650 mg, Oral, Q4H PRN, Tavia Hernandez MD    polyethylene glycol (GLYCOLAX) packet 17 g, 17 g, Oral, Daily PRN, Sylvester Casanova MD    haloperidol lactate (HALDOL) injection 5 mg, 5 mg, Intramuscular, Q6H PRN **OR** haloperidol (HALDOL) tablet 5 mg, 5 mg, Oral, Q6H PRN, Sylvester Casanova MD    hydrOXYzine (VISTARIL) injection 50 mg, 50 mg, Intramuscular, Q6H PRN **OR** hydrOXYzine (VISTARIL) capsule 50 mg, 50 mg, Oral, Q6H PRN, Sylvester Casanova MD, 50 mg at 02/27/21 0050    nicotine polacrilex (NICORETTE) gum 2 mg, 2 mg, Oral, PRN, Sylvester Casanova MD, 2 mg at 03/05/21 8604    LORazepam (ATIVAN) tablet 1 mg, 1 mg, Oral, Q6H PRN, 1 mg at 02/26/21 0415 **OR** LORazepam (ATIVAN) injection 1 mg, 1 mg, Intramuscular, Q6H PRN, Sylvester Casanova MD    influenza quadrivalent split vaccine (FLUZONE;FLUARIX;FLULAVAL;AFLURIA) injection 0.5 mL, 0.5 mL, Intramuscular, Prior to discharge, Sylvester Casanova MD    Examination:  /80   Pulse 90   Temp 98 °F (36.7 °C) (Oral)   Resp 20   Ht 5' 9\" (1.753 m)   Wt 140 lb (63.5 kg)   SpO2 99%   BMI 20.67 kg/m²   Gait - steady    HOSPITAL COURSE[de-identified]  Following admission to the hospital, patient had a complete physical exam and blood work up  Patient was monitored closely with suicide precaution  Patient was started on meds as listed below  Was encouraged to participate in group and other milieu activity  Patient started to feel better with this combination of treatment. Significant progress in the symptoms since admission. Mood better, with the score of 2/10 - bad  No AVH or paranoid thoughts  No Hopeless or worthless feeling  No active SI/HI  Appetite:  [x] Normal  [] Increased  [] Decreased    Sleep:       [x] Normal  [] Fair       [] Poor            Energy:    [x] Normal  [] Increased  [] Decreased     SI [] Present  [x] Absent  HI  []Present  [x] Absent   Aggression:  [] yes  [] no  Patient is [x] able  [] unable to CONTRACT FOR SAFETY   Medication side effects(SE):  [x] None(Psych.  Meds.) [] Other      Mental Status Examination on discharge:    Level of consciousness:  within normal limits   Appearance:  well-appearing  Behavior/Motor:  no abnormalities noted  Attitude toward examiner:  attentive and good eye contact  Speech:  spontaneous, normal rate and normal volume   Mood: euthymic  Affect:  blunted  Thought processes:  slow   Thought content:  Suicidal Ideation:  denies suicidal ideation  Cognition:  oriented to person, place, and time   Concentration intact  Memory intact  Insight good   Judgement fair   Fund of Knowledge adequate      ASSESSMENT:  Patient symptoms are:  [x] Well controlled  [x] Improving  [] Worsening  [] No change      Diagnosis:  Active Problems:    Schizoaffective disorder, bipolar type ([facility] [address]  Resolved Problems:    * No resolved hospital problems. *      LABS:    No results for input(s): WBC, HGB, PLT in the last 72 hours. No results for input(s): NA, K, CL, CO2, BUN, CREATININE, GLUCOSE in the last 72 hours. No results for input(s): BILITOT, ALKPHOS, AST, ALT in the last 72 hours. Lab Results   Component Value Date    LABAMPH Neg 02/24/2021    BARBSCNU Neg 02/24/2021    LABBENZ Neg 02/24/2021    LABMETH Neg 02/24/2021    OPIATESCREENURINE Neg 02/24/2021    PHENCYCLIDINESCREENURINE Neg 02/24/2021    ETOH <10 02/24/2021     Lab Results   Component Value Date    TSH 1.970 02/24/2021     No results found for: LITHIUM  Lab Results   Component Value Date    VALPROATE 63.7 03/01/2021       RISK ASSESSMENT AT DISCHARGE: Low risk for suicide and homicide. Treatment Plan:  Reviewed current Medications with the patient. Education provided on the complaince with treatment. Risks, benefits, side effects, drug-to-drug interactions and alternatives to treatment were discussed. Encourage patient to attend outpatient follow up appointment and therapy. Patient was advised to call the outpatient provider, visit the nearest ED or call 911 if symptoms are not manageable. Patient's family member was contacted prior to the discharge. Medication List      START taking these medications    ARIPiprazole 15 MG tablet  Commonly known as: ABILIFY  Take 1 tablet by mouth daily  Start taking on: March 6, 2021     zolpidem 10 MG tablet  Commonly known as: AMBIEN  Take 1 tablet by mouth nightly for 14 days.         CHANGE how you take these medications    divalproex 250 MG DR tablet  Commonly known as: DEPAKOTE  1 tab in the morning and 2 tab at night  What changed: additional instructions        STOP taking these medications    paliperidone 6 MG extended release tablet  Commonly known as: INVEGA     paliperidone palmitate  MG/0.75ML [place] IM injection  Commonly known as: INVEGA SUSTENNA           Where to Get Your Medications      These medications were sent to [facility] - [place], [place] - [address] [address] [address] [address]    Phone: [phone]   · ARIPiprazole 15 MG tablet  · divalproex 250 MG DR tablet     You can get these medications from any pharmacy    Bring a paper prescription for each of these medications  · zolpidem 10 MG tablet           Reason for more than one antipsychotic:   [x] N/A  [] 3 failed monotherapy(drugs tried):  [] Cross over to a new antipsychotic  [] Taper to monotherapy from polypharmacy  [] Augmentation of Clozapine therapy due to treatment resistance to single therapy        TIME SPEND - 35 MINUTES TO COMPLETE THE EVALUATION, DISCHARGE SUMMARY, MEDICATION RECONCILIATION AND FOLLOW UP CARE     SignedJoni Brittle  3/5/2021  9:38 AM

## 2021-03-05 NOTE — DISCHARGE INSTR - DIET
? Good nutrition is important when healing from an illness, injury, or surgery. Follow any nutrition recommendations given to you during your hospital stay. ? If you were given an oral nutrition supplement while in the hospital, continue to take this supplement at home. You can take it with meals, in-between meals, and/or before bedtime. These supplements can be purchased at most local grocery stores, pharmacies, and chain [employer]-stores. ? If you have any questions about your diet or nutrition, call the hospital and ask for the dietitian.   As tolerated

## 2021-03-05 NOTE — GROUP NOTE
Group Therapy Note    Date: 3/5/2021    Group Start Time: 1000  Group End Time: 8719  Group Topic: Psychoeducation    [facility]    [place]        Group Therapy Note    Attendees: 7         Patient's Goal:  \"Keep it cool\"    Notes:  Patient was calmer and more relax. He was excited about going home today. He work fairly well on his task. Status After Intervention:  Improved    Participation Level:  Active Listener    Participation Quality: Appropriate      Speech:  More talkative      Thought Process/Content: Linear      Affective Functioning: Congruent      Mood: calmer      Level of consciousness:  Alert      Response to Learning: Progressing to goal      Endings: None Reported    Modes of Intervention: Education, Socialization and Activity      Discipline Responsible: Psychoeducational Specialist      Signature:  Nivia Peters

## 2021-03-05 NOTE — GROUP NOTE
Group Therapy Note    Date: 3/4/2021    Group Start Time: 2100  Group End Time: 2120  Group Topic: Wrap-Up    [facility]    Milo Avelar RN                 Patient's Goal:  To take medication    Notes:  Pt attentive and sharing. Pt pleasant    Status After Intervention:  Unchanged    Participation Level:  Active Listener and Interactive    Participation Quality: Appropriate, Attentive, Sharing and Supportive      Speech:  normal      Thought Process/Content: Logical  Linear      Affective Functioning: Congruent      Mood: WNL      Level of consciousness:  Alert, Oriented x4 and Attentive      Response to Learning: Able to verbalize current knowledge/experience, Able to verbalize/acknowledge new learning, Able to retain information, Capable of insight, Able to change behavior and Progressing to goal      Endings: None Reported    Modes of Intervention: Support and Socialization      Discipline Responsible: Registered Nurse      Signature:  Milo Avlear RN

## 2021-03-05 NOTE — PROGRESS NOTES
Pt. attended the 0900 community meeting. Electronically signed by Venita Strauss, [address] on 3/5/2021 at 11:59 AM

## 2021-03-05 NOTE — CARE COORDINATION
2/28/21 @ 9:35    This writer approached patient to sign his patient goal sheet. Patient began talking with this writer, stating  \"This place is a living hell. I was told I would be going home on Monday,  but now I'm not. I'm only here because I needled sleep. I can't sleep with you people checking on me every 15 minutes. The doctor gave me Ambien but it's not working. I am not going to take medication when I get home because I don't need to. They make you show them your tongue to prove you're taking your meds, and this morning I threw up my meds. .\" Patient became tearful. This writer asked patient if we could go to his room and talk some more.  Patient stated, \"I just want to keep walking the hallway\"
BHI Biopsychosocial Assessment    Current Level of Psychosocial Functioning     Independent  x  Dependent    Minimal Assist     Comments:  Lives with parents. Psychosocial High Risk Factors (check all that apply)    Unable to obtain meds   Chronic illness/pain    Substance abuse   Lack of Family Support   Financial stress   Isolation   Inadequate Community Resources  Suicide attempt(s)  Not taking medications x  Victim of crime   Developmental Delay  Unable to manage personal needs    Age 72 or older   Homeless  No transportation   Readmission within 30 days  Unemployment  Traumatic Event    Psychiatric Advanced Directive: LAURYN    Family to involve in treatment: mom     Sexual Orientation:  Did not identify    Patient Strengths: support, employed    Patient Barriers: does not want to take medications    Opiate education provided: NA    Safety plan: completed    CMHC/MH history: not connected to outpt    Plan of Care:  medication management, group/individual therapies, family meetings, psycho -education, treatment team meetings to assist with stabilization    Initial Discharge Plan:  Call collateral    Clinical Summary:  Patient stated that his mom brought him here but he does not think he needs to be here and stated, \" This place scares me. \"  Patient lives with his parents and is employed. Patient denies SI/HI. Patient has a fear that he will die in his sleep and go to nothing.    Electronically signed by Lonna Leyden, [address] on 2/26/2021 at 3:43 PM
Brief Intervention and Referral to Treatment Summary    Patient was provided PHQ-9, AUDIT and DAST Screening:      PHQ-9 Score: 0  AUDIT Score: 0   DAST Score:  1    Patients substance use is considered     Low Risk/[facility]  Moderate Risk  Harmful  Dependent    Patients depression is considered:     Minimalx  Mild   Moderate  Moderately Severe  Severe    Brief Education Was Provided    Patient was receptivex  Patient was not receptive      Brief Intervention Is Provided (Only for AUDIT or DAST)     Patient reports readiness to decrease and/or stop use and a plan was discussed   Patient denies readiness to decrease and/or stop use and a plan was not discussedx      Recommendations/Referrals for Brief and/or Specialized Treatment Provided to Patient   Patient stated he uses marijuana but does not consider it a issue. Patient tox was positive for THC.    Electronically signed by Joseph Blas on 2/26/2021 at 3:36 PM
Called for report from [facility]- they will call back in a few minutes
Discharge instructions reviewed verbally and in writing including f/u appointments. Patient verbalizes understanding and signed as such. All belongings returned for discharge. Patient denies SI, HI, A/V hallucinations, mood is stable.   Discharged with mother for transport home
Pt. Cooperative and pleasant. Denies all. Pt. Reports feeling \"tired\" and did not sleep well last night. Pt. Doing laps around the unit, but does report boredom. Pt. Anxious for discharge. Denies depression and 6/10 anxiety. Pt. Is watchful.
Pt. Denies all. FOI with bizarre statements. Pt. Talked about \"dream delusions. \"  \"I start rhyming in my own head. \"  Pt. States he likes to write poetry and talked about a girl in his past.  \"I'm thinking about love and the 32 club. I'm going to be 27 and I'm afraid I won't wake up. \"  Pt. Reports thinking about his future and where he's at in life. Pt. Said, \"It's true, I am crazy, more than I thought. \"  Pt. Rhymed again and said, Birda Memory you not supposed to rhyme?\"  2/10 depression and denies anxiety. Pt. Sates he slept 3 to 4 hours if he was pam. Poor appetite, but states the food is bad and he forces himself to eat. Pt. Is calm, cooperative, and pleasant. Pressured speech noted.
Pt. Laps the unit, then relaxes in bed. Denies all. Reports anxiety and depression fluctuate during the day and are high \"if I dwell on things. \"  Pt. Reports his mood is \"neutral\", but \"I'm going stir crazy. \"  \"The medication makes me feel like I don't think anymore. Doctors just want to dope you up. \"  Pressured speech with FOI, but improving. Began to become slightly irritable, but then calmed down. Pt. Reports vomiting his pill the other day d/t the hospital food being bad. Pt. States that pills are bad for the environment in regards to his urine \"looking greener and having crystal bubbles\" and entering the  system and affecting bugs and animals. Pt. Paranoid and said, \"Am I going to have to stay longer because I told you that? \"   Encouraged pt. To continue to take his medications and that he was improving. Pt. Currently in sensory room listening to music with bright affect, but slightly exaggerated. Talkative, but speech is less pressured.
Pt. Lying in bed and continues to have disorganized thoughts with pressured speech. Delusions continue in that pt. Is afraid he's going to choke at night. \"That's why I can't sleep. \"  Pt. Continues to believe he can help his mental status with a good diet and sleep. \"The shot you guys gave me last time I was here gave me bad anxiety. I want to go all natural.  Pt. Has many concerns about medication side affects such as gynecomastia and abnormal testosterone levels. Pt. Also reports his mother has liver problems and is \"afraid of bad genetics. \"  Pt. Also states that he Bernard Sorrow threw up once and will take his medications to get out of this [facility]. \"  Pt. Began to have a hostile affect and said, \"I know I look hostile and defensive\", with an intense glare. Pt. Came out to eat and continues to do laps around the unit.
Reports pt calls her several times a day. \"he sounds much much much better to me. \" \"from everthing I can tell, as long as he is willing to be compliant with meds, he is ready to come home. \" reports pt has been calling and apologizing when they have an argument via phone. I want him to understand that coming home means not to smoking pot and take his medications. Reports pt started a new job. Will need a return to work letter, mom is wondering when pt can return to work. Mom informed that writer that her and pt's dad are recently retired and will be home with him when he discharges. She is requesting to be updated regarding potential discharge tomorrow and if pt is not discharged to be provided with doctors reasoning.  Electronically signed by MIKEY Mckeon on 3/4/2021 at 12:06 PM
Writer contacted pt's mother Lucita Alejandre. Informed her that pt is discharging today and that doctor states that they need to monitor meds and complete a weekly pill minder. Informed her that doctor states if pt is not compliant to focus on the [place] as his symptoms get worse with decrease in sleep. Informed her that pt will have RTW for 3/15/21. Also informed her that it is recommended that pt is referred to [facility]'s first program. Mom states that pt does not like [facility] but she understands it is the best program for him.  Electronically signed by Slater Nyhan , LISW-S on 3/5/2021 at 9:44 AM
Support agreeable to Safeguard and Monitor Medications (including Prescription and OTC): Mom agreed to monitor daily medications, but would prefer a long acting, if possible    Identified Barriers to Compliance with Discharge Plan:   refusal of medication, mariajuana, not eating    Recommendations for Support Network: [employer] with any questions.          Abraham Mccollum RN

## 2022-02-22 NOTE — GROUP NOTE
Group Therapy Note    Date: 3/2/2021    Group Start Time: 0592  Group End Time: 2184  Group Topic: Recreational    [facility]    Rudy Zuluaga        Group Therapy Note    Attendees:9/14         Patient's Goal:  To play Wii Compologyling with the group. Notes:  Patient played the game with peers.     Status After Intervention:  Improved    Participation Level: Interactive    Participation Quality: Appropriate, Attentive and Supportive      Speech:  normal      Thought Process/Content: Logical      Affective Functioning: Congruent      Mood: euthymic      Level of consciousness:  Alert and Attentive      Response to Learning: Progressing to goal      Endings: None Reported    Modes of Intervention: Activity      Discipline Responsible: Yorder      Signature:  Rudy Zuluaga [Follow-Up: _____] : a [unfilled] follow-up visit

## 2022-11-20 ENCOUNTER — HOSPITAL ENCOUNTER (EMERGENCY)
Age: 27
Discharge: ANOTHER HOSPITAL | End: 2022-11-21
Payer: COMMERCIAL

## 2022-11-20 DIAGNOSIS — F25.0 SCHIZOAFFECTIVE DISORDER, BIPOLAR TYPE (HCC): Primary | ICD-10-CM

## 2022-11-20 LAB
ACETAMINOPHEN LEVEL: <5 UG/ML (ref 10–30)
ALBUMIN SERPL-MCNC: 4.6 G/DL (ref 3.5–4.6)
ALP BLD-CCNC: 94 U/L (ref 35–104)
ALT SERPL-CCNC: 6 U/L (ref 0–41)
AMPHETAMINE SCREEN, URINE: ABNORMAL
ANION GAP SERPL CALCULATED.3IONS-SCNC: 12 MEQ/L (ref 9–15)
AST SERPL-CCNC: 9 U/L (ref 0–40)
BARBITURATE SCREEN URINE: ABNORMAL
BASOPHILS ABSOLUTE: 0 K/UL (ref 0–0.2)
BASOPHILS RELATIVE PERCENT: 0.5 %
BENZODIAZEPINE SCREEN, URINE: ABNORMAL
BILIRUB SERPL-MCNC: 1.2 MG/DL (ref 0.2–0.7)
BILIRUBIN URINE: NEGATIVE
BLOOD, URINE: NEGATIVE
BUN BLDV-MCNC: 13 MG/DL (ref 6–20)
CALCIUM SERPL-MCNC: 9.6 MG/DL (ref 8.5–9.9)
CANNABINOID SCREEN URINE: POSITIVE
CHLORIDE BLD-SCNC: 102 MEQ/L (ref 95–107)
CHOLESTEROL, TOTAL: 208 MG/DL (ref 0–199)
CLARITY: CLEAR
CO2: 25 MEQ/L (ref 20–31)
COCAINE METABOLITE SCREEN URINE: ABNORMAL
COLOR: YELLOW
CREAT SERPL-MCNC: 0.79 MG/DL (ref 0.7–1.2)
EOSINOPHILS ABSOLUTE: 0.4 K/UL (ref 0–0.7)
EOSINOPHILS RELATIVE PERCENT: 4 %
ETHANOL PERCENT: NORMAL G/DL
ETHANOL: <10 MG/DL (ref 0–0.08)
FENTANYL SCREEN, URINE: ABNORMAL
GFR SERPL CREATININE-BSD FRML MDRD: >60 ML/MIN/{1.73_M2}
GLOBULIN: 2.8 G/DL (ref 2.3–3.5)
GLUCOSE BLD-MCNC: 94 MG/DL (ref 70–99)
GLUCOSE URINE: NEGATIVE MG/DL
HCT VFR BLD CALC: 45.5 % (ref 42–52)
HDLC SERPL-MCNC: 44 MG/DL (ref 40–59)
HEMOGLOBIN: 15.5 G/DL (ref 14–18)
KETONES, URINE: NEGATIVE MG/DL
LDL CHOLESTEROL CALCULATED: 153 MG/DL (ref 0–129)
LEUKOCYTE ESTERASE, URINE: NEGATIVE
LYMPHOCYTES ABSOLUTE: 1.4 K/UL (ref 1–4.8)
LYMPHOCYTES RELATIVE PERCENT: 13.3 %
Lab: ABNORMAL
MCH RBC QN AUTO: 31.6 PG (ref 27–31.3)
MCHC RBC AUTO-ENTMCNC: 34 % (ref 33–37)
MCV RBC AUTO: 92.9 FL (ref 79–92.2)
METHADONE SCREEN, URINE: ABNORMAL
MONOCYTES ABSOLUTE: 0.7 K/UL (ref 0.2–0.8)
MONOCYTES RELATIVE PERCENT: 6.5 %
NEUTROPHILS ABSOLUTE: 7.9 K/UL (ref 1.4–6.5)
NEUTROPHILS RELATIVE PERCENT: 75.7 %
NITRITE, URINE: NEGATIVE
OPIATE SCREEN URINE: ABNORMAL
OXYCODONE URINE: ABNORMAL
PDW BLD-RTO: 13.2 % (ref 11.5–14.5)
PH UA: 6 (ref 5–9)
PHENCYCLIDINE SCREEN URINE: ABNORMAL
PLATELET # BLD: 213 K/UL (ref 130–400)
POTASSIUM SERPL-SCNC: 3.7 MEQ/L (ref 3.4–4.9)
PROPOXYPHENE SCREEN: ABNORMAL
PROTEIN UA: NEGATIVE MG/DL
RBC # BLD: 4.9 M/UL (ref 4.7–6.1)
SALICYLATE, SERUM: <0.3 MG/DL (ref 15–30)
SARS-COV-2, NAAT: NOT DETECTED
SODIUM BLD-SCNC: 139 MEQ/L (ref 135–144)
SPECIFIC GRAVITY UA: 1 (ref 1–1.03)
TOTAL CK: 75 U/L (ref 0–190)
TOTAL PROTEIN: 7.4 G/DL (ref 6.3–8)
TRIGL SERPL-MCNC: 57 MG/DL (ref 0–150)
TSH SERPL DL<=0.05 MIU/L-ACNC: 1.06 UIU/ML (ref 0.44–3.86)
URINE REFLEX TO CULTURE: NORMAL
UROBILINOGEN, URINE: 0.2 E.U./DL
WBC # BLD: 10.4 K/UL (ref 4.8–10.8)

## 2022-11-20 PROCEDURE — 80307 DRUG TEST PRSMV CHEM ANLYZR: CPT

## 2022-11-20 PROCEDURE — 85025 COMPLETE CBC W/AUTO DIFF WBC: CPT

## 2022-11-20 PROCEDURE — 6370000000 HC RX 637 (ALT 250 FOR IP): Performed by: PHYSICIAN ASSISTANT

## 2022-11-20 PROCEDURE — 36415 COLL VENOUS BLD VENIPUNCTURE: CPT

## 2022-11-20 PROCEDURE — 82550 ASSAY OF CK (CPK): CPT

## 2022-11-20 PROCEDURE — 84443 ASSAY THYROID STIM HORMONE: CPT

## 2022-11-20 PROCEDURE — 80143 DRUG ASSAY ACETAMINOPHEN: CPT

## 2022-11-20 PROCEDURE — 87635 SARS-COV-2 COVID-19 AMP PRB: CPT

## 2022-11-20 PROCEDURE — 80061 LIPID PANEL: CPT

## 2022-11-20 PROCEDURE — 81003 URINALYSIS AUTO W/O SCOPE: CPT

## 2022-11-20 PROCEDURE — 82077 ASSAY SPEC XCP UR&BREATH IA: CPT

## 2022-11-20 PROCEDURE — 99285 EMERGENCY DEPT VISIT HI MDM: CPT

## 2022-11-20 PROCEDURE — 93005 ELECTROCARDIOGRAM TRACING: CPT | Performed by: PERSONAL EMERGENCY RESPONSE ATTENDANT

## 2022-11-20 PROCEDURE — 80053 COMPREHEN METABOLIC PANEL: CPT

## 2022-11-20 PROCEDURE — 80179 DRUG ASSAY SALICYLATE: CPT

## 2022-11-20 RX ORDER — LORAZEPAM 1 MG/1
2 TABLET ORAL ONCE
Status: COMPLETED | OUTPATIENT
Start: 2022-11-20 | End: 2022-11-20

## 2022-11-20 RX ORDER — HALOPERIDOL 5 MG/1
5 TABLET ORAL ONCE
Status: COMPLETED | OUTPATIENT
Start: 2022-11-20 | End: 2022-11-20

## 2022-11-20 RX ADMIN — HALOPERIDOL 5 MG: 5 TABLET ORAL at 22:54

## 2022-11-20 RX ADMIN — LORAZEPAM 2 MG: 1 TABLET ORAL at 22:54

## 2022-11-20 ASSESSMENT — ENCOUNTER SYMPTOMS
COLOR CHANGE: 0
ABDOMINAL PAIN: 0
SHORTNESS OF BREATH: 0
EYE PAIN: 0
TROUBLE SWALLOWING: 0
ALLERGIC/IMMUNOLOGIC NEGATIVE: 1
APNEA: 0

## 2022-11-20 ASSESSMENT — PAIN - FUNCTIONAL ASSESSMENT: PAIN_FUNCTIONAL_ASSESSMENT: NONE - DENIES PAIN

## 2022-11-20 ASSESSMENT — PATIENT HEALTH QUESTIONNAIRE - PHQ9: SUM OF ALL RESPONSES TO PHQ QUESTIONS 1-9: 12

## 2022-11-21 VITALS
DIASTOLIC BLOOD PRESSURE: 77 MMHG | RESPIRATION RATE: 16 BRPM | WEIGHT: 135 LBS | TEMPERATURE: 98.4 F | OXYGEN SATURATION: 99 % | SYSTOLIC BLOOD PRESSURE: 122 MMHG | BODY MASS INDEX: 20.46 KG/M2 | HEIGHT: 68 IN | HEART RATE: 76 BPM

## 2022-11-21 LAB
EKG ATRIAL RATE: 65 BPM
EKG P AXIS: 9 DEGREES
EKG P-R INTERVAL: 154 MS
EKG Q-T INTERVAL: 408 MS
EKG QRS DURATION: 100 MS
EKG QTC CALCULATION (BAZETT): 424 MS
EKG R AXIS: 55 DEGREES
EKG T AXIS: 35 DEGREES
EKG VENTRICULAR RATE: 65 BPM

## 2022-11-21 PROCEDURE — 6370000000 HC RX 637 (ALT 250 FOR IP): Performed by: EMERGENCY MEDICINE

## 2022-11-21 RX ORDER — LORAZEPAM 1 MG/1
1 TABLET ORAL ONCE
Status: COMPLETED | OUTPATIENT
Start: 2022-11-21 | End: 2022-11-21

## 2022-11-21 RX ORDER — HALOPERIDOL 5 MG/1
5 TABLET ORAL ONCE
Status: COMPLETED | OUTPATIENT
Start: 2022-11-21 | End: 2022-11-21

## 2022-11-21 RX ADMIN — LORAZEPAM 1 MG: 1 TABLET ORAL at 10:19

## 2022-11-21 RX ADMIN — HALOPERIDOL 5 MG: 5 TABLET ORAL at 10:19

## 2022-11-21 NOTE — ED NOTES
Call placed to [facility] and notified Irma Ma RN of medications being administered and ETA. Report given to transport time. Call placed to [facility] and all belongings placed on squad.       Ty Flores RN  11/21/22 6756

## 2022-11-21 NOTE — ED NOTES
Medicated as ordered. Cooperative with medication administration. Patient aware [facility] here to transport to JOSE RAMON Moss RN  11/21/22 5379

## 2022-11-21 NOTE — ED NOTES
Dr. Divya Pedro notified of need for medications for agitation. Medication order received.      Jose Chris RN  11/21/22 6250

## 2022-11-21 NOTE — ED NOTES
Dr. Adi Hernandez is accepting     Nurse to nurse [phone]    Patient is going to the 1500 unit     Aiden Covington, [facility]  11/21/22 9980

## 2022-11-21 NOTE — ED NOTES
Patient yelling out loudly @ times. Medications offered & Patient agrees to take medications.      Adele Powell RN  11/21/22 4607
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
## 2022-11-21 NOTE — ED NOTES
Charleen Zayas PA-C notified of patients restlessness, internal stimulation, and request for medication to help calm down.              Alfredo Cardenas RN  11/20/22 6394

## 2022-11-21 NOTE — ED NOTES
Patient awake and alert, compliant with vitals. Requested and received ginger ale. Now observed responding to internal stimuli in bed.       Ada Elliott RN  11/21/22 8794

## 2022-11-21 NOTE — ED PROVIDER NOTES
[address] ED  EMERGENCY DEPARTMENT ENCOUNTER      Pt Name: Lorie Quevedo  MRN: [id number]  [place] 1995  Date of evaluation: 11/20/2022  Provider: Kathy Andres PA-C    CHIEF COMPLAINT       Chief Complaint   Patient presents with    Psychiatric Evaluation         HISTORY OF PRESENT ILLNESS   (Location/Symptom, Timing/Onset, Context/Setting, Quality, Duration, Modifying Factors, Severity)  Note limiting factors. Lorie Quevedo is a 32 y.o. male who presents to the emergency department with complaints of severe anxiety and \"afraid I am going to fade away\", stating \"I am afraid that I will go to sleep and not wake up\". Patient denies any thoughts of suicide or homicide. Patient denies any auditory or visual hallucinations. He denies any recent illnesses or injuries. HPI    Nursing Notes were reviewed. REVIEW OF SYSTEMS    (2-9 systems for level 4, 10 or more for level 5)     Review of Systems   Constitutional:  Negative for diaphoresis and fever. HENT:  Negative for hearing loss and trouble swallowing. Eyes:  Negative for pain. Respiratory:  Negative for apnea and shortness of breath. Cardiovascular:  Negative for chest pain. Gastrointestinal:  Negative for abdominal pain. Endocrine: Negative. Genitourinary:  Negative for hematuria. Musculoskeletal:  Negative for neck pain and neck stiffness. Skin:  Negative for color change. Allergic/Immunologic: Negative. Neurological:  Negative for dizziness and numbness. Hematological: Negative. Psychiatric/Behavioral:  The patient is nervous/anxious. All other systems reviewed and are negative. Except as noted above the remainder of the review of systems was reviewed and negative. PAST MEDICAL HISTORY   No past medical history on file. SURGICAL HISTORY     No past surgical history on file.       CURRENT MEDICATIONS       Previous Medications    ARIPIPRAZOLE (ABILIFY) 15 MG TABLET    Take 1 tablet by mouth daily    DIVALPROEX (DEPAKOTE) 250 MG DR TABLET    1 tab in the morning and 2 tab at night       ALLERGIES     Nuts [peanut-containing drug products]    FAMILY HISTORY       Family History   Family history unknown: Yes          SOCIAL HISTORY       Social History     Socioeconomic History    Marital status: Single   Tobacco Use    Smoking status: Every Day     Packs/day: 1.00     Types: Cigarettes    Smokeless tobacco: Never   Vaping Use    Vaping Use: Former    Substances: Nicotine   Substance and Sexual Activity    Alcohol use: Not Currently    Drug use: Yes     Types: Marijuana (Jacklynn Anjali)       SCREENINGS        Neal Coma Scale  Eye Opening: Spontaneous  Best Verbal Response: Oriented  Best Motor Response: Obeys commands  Frazer Coma Scale Score: 15               PHYSICAL EXAM    (up to 7 for level 4, 8 or more for level 5)     ED Triage Vitals [11/20/22 1857]   BP Temp Temp Source Heart Rate Resp SpO2 Height Weight   134/84 98.3 °F (36.8 °C) Temporal 80 18 99 % 5' 8\" (1.727 m) 135 lb (61.2 kg)       Physical Exam  Vitals and nursing note reviewed. Constitutional:       General: He is not in acute distress. Appearance: He is well-developed. He is not diaphoretic. HENT:      Head: Normocephalic and atraumatic. Mouth/Throat:      Mouth: Mucous membranes are moist.      Pharynx: No oropharyngeal exudate. Eyes:      General: No scleral icterus. Conjunctiva/sclera: Conjunctivae normal.      Pupils: Pupils are equal, round, and reactive to light. Neck:      Trachea: No tracheal deviation. Cardiovascular:      Rate and Rhythm: Normal rate. Heart sounds: Normal heart sounds. Pulmonary:      Effort: Pulmonary effort is normal. No respiratory distress. Breath sounds: Normal breath sounds. Abdominal:      General: Bowel sounds are normal. There is no distension. Palpations: Abdomen is soft. Musculoskeletal:         General: Normal range of motion.       Cervical back: Normal range of motion and neck supple. No rigidity or tenderness. Skin:     General: Skin is warm and dry. Findings: No erythema or rash. Neurological:      Mental Status: He is alert and oriented to person, place, and time. Cranial Nerves: No cranial nerve deficit. Motor: No abnormal muscle tone. Psychiatric:         Mood and Affect: Mood is anxious. Behavior: Behavior is hyperactive. Thought Content: Thought content normal.       DIAGNOSTIC RESULTS     EKG: All EKG's are interpreted by the Emergency Department Physician who either signs or Co-signs this chart in the absence of a cardiologist.    Normal sinus rhythm, rate 65 bpm, generalized ST elevation consistent with early repolarization    RADIOLOGY:   Non-plain film images such as CT, Ultrasound and MRI are read by the radiologist. Plain radiographic images are visualized and preliminarily interpreted by the emergency physician with the below findings:      Interpretation per the Radiologist below, if available at the time of this note:    No orders to display         ED BEDSIDE ULTRASOUND:   Performed by ED Physician - none    LABS:  Labs Reviewed   ACETAMINOPHEN LEVEL - Abnormal; Notable for the following components:       Result Value    Acetaminophen Level <5 (*)     All other components within normal limits   CBC WITH AUTO DIFFERENTIAL - Abnormal; Notable for the following components:    MCV 92.9 (*)     MCH 31.6 (*)     Neutrophils Absolute 7.9 (*)     All other components within normal limits   COMPREHENSIVE METABOLIC PANEL - Abnormal; Notable for the following components:     Total Bilirubin 1.2 (*)     All other components within normal limits   LIPID PANEL - Abnormal; Notable for the following components:    Cholesterol, Total 208 (*)     LDL Calculated 153 (*)     All other components within normal limits   SALICYLATE LEVEL - Abnormal; Notable for the following components:    Salicylate, Serum <1.3 (*)     All other components within normal limits   URINE DRUG SCREEN - Abnormal; Notable for the following components:    Cannabinoid Scrn, Ur POSITIVE (*)     All other components within normal limits   COVID-19, RAPID   CK   ETHANOL   TSH   URINALYSIS WITH REFLEX TO CULTURE       All other labs were within normal range or not returned as of this dictation. EMERGENCY DEPARTMENT COURSE and DIFFERENTIAL DIAGNOSIS/MDM:   Vitals:    Vitals:    11/20/22 1857   BP: 134/84   Pulse: 80   Resp: 18   Temp: 98.3 °F (36.8 °C)   TempSrc: Temporal   SpO2: 99%   Weight: 135 lb (61.2 kg)   Height: 5' 8\" (1.727 m)           MDM        REASSESSMENT        Patient is medically cleared for psychiatric evaluation and care    Patient was seen and evaluated and will be admitted by psychiatry      CONSULTS:  None    PROCEDURES:  Unless otherwise noted below, none     Procedures        FINAL IMPRESSION      1. Schizoaffective disorder, bipolar type [facility])          DISPOSITION/PLAN   DISPOSITION Decision To Admit 11/20/2022 11:40:41 PM      PATIENT REFERRED TO:  No follow-up provider specified. DISCHARGE MEDICATIONS:  New Prescriptions    No medications on file     Controlled Substances Monitoring:     No flowsheet data found.     (Please note that portions of this note were completed with a voice recognition program.  Efforts were made to edit the dictations but occasionally words are mis-transcribed.)    Jessi Maria PA-C (electronically signed)  Attending Emergency Physician            Jessi Maria PA-C  11/20/22 5581

## 2022-11-21 NOTE — ED NOTES
Pacing in area. Gait steady. Mood irritable. Medications offered. Patient refused medications @ this time. [place] tray given.      Gume Blanac RN  11/21/22 5371

## 2022-11-21 NOTE — ED NOTES
Awaiting transfer to [facility] (W/L) after 1000 per report from [employer]. Resting with eyes closed & no acute distress noted.      Anisa Lugo RN  11/21/22 9248

## 2022-11-21 NOTE — ED NOTES
Patient continues to self dialogue in bed. Observed angrily talking to unseen other. Patient now up pacing around the unit.       David Mcleod RN  11/20/22 9453

## 2022-11-21 NOTE — ED NOTES
Patient changed into psych safe clothing. COVID obtained and sent to lab. Unable to produce urine at this time. Provided with water and encouraged to drink.       Bessy Ling RN  11/20/22 1919

## 2022-11-21 NOTE — ED NOTES
Report given to Agapito Christian RN at [facility].       Warren Patterson RN  11/21/22 5897

## 2022-11-21 NOTE — ED NOTES
Per [facility] will  Patient @ 1000 for transport to W/L.      Nando Ziegler RN  11/21/22 5956

## 2022-11-21 NOTE — ED NOTES
Patient continues to be resting in bed with even and non-labored respirations. No distress noted.       David NANO Mcleod  11/21/22 9072

## 2022-11-21 NOTE — ED NOTES
Patient resting quietly respirations are even and unlabored no distress noted at this time.        Isabell Quezada RN  11/21/22 5964

## 2023-06-12 ENCOUNTER — HOSPITAL ENCOUNTER (INPATIENT)
Age: 28
LOS: 6 days | Discharge: HOME | DRG: 885 | End: 2023-06-19
Attending: STUDENT IN AN ORGANIZED HEALTH CARE EDUCATION/TRAINING PROGRAM | Admitting: PSYCHIATRY & NEUROLOGY
Payer: MEDICAID

## 2023-06-12 DIAGNOSIS — F25.0 SCHIZOAFFECTIVE DISORDER, BIPOLAR TYPE (HCC): Primary | ICD-10-CM

## 2023-06-12 LAB
ALBUMIN SERPL-MCNC: 4.3 G/DL (ref 3.5–4.6)
ALP SERPL-CCNC: 86 U/L (ref 35–104)
ALT SERPL-CCNC: 9 U/L (ref 0–41)
AMPHET UR QL SCN: ABNORMAL
ANION GAP SERPL CALCULATED.3IONS-SCNC: 11 MEQ/L (ref 9–15)
APAP SERPL-MCNC: <5 UG/ML (ref 10–30)
AST SERPL-CCNC: 10 U/L (ref 0–40)
BARBITURATES UR QL SCN: ABNORMAL
BASOPHILS # BLD: 0.1 K/UL (ref 0–0.2)
BASOPHILS NFR BLD: 0.8 %
BENZODIAZ UR QL SCN: ABNORMAL
BILIRUB SERPL-MCNC: 0.7 MG/DL (ref 0.2–0.7)
BILIRUB UR QL STRIP: NEGATIVE
BUN SERPL-MCNC: 11 MG/DL (ref 6–20)
CALCIUM SERPL-MCNC: 9.1 MG/DL (ref 8.5–9.9)
CANNABINOIDS UR QL SCN: POSITIVE
CHLORIDE SERPL-SCNC: 101 MEQ/L (ref 95–107)
CHOLEST SERPL-MCNC: 183 MG/DL (ref 0–199)
CK SERPL-CCNC: 50 U/L (ref 0–190)
CLARITY UR: CLEAR
CO2 SERPL-SCNC: 27 MEQ/L (ref 20–31)
COCAINE UR QL SCN: ABNORMAL
COLOR UR: YELLOW
CREAT SERPL-MCNC: 0.93 MG/DL (ref 0.7–1.2)
DRUG SCREEN COMMENT UR-IMP: ABNORMAL
EOSINOPHIL # BLD: 0.2 K/UL (ref 0–0.7)
EOSINOPHIL NFR BLD: 2.1 %
ERYTHROCYTE [DISTWIDTH] IN BLOOD BY AUTOMATED COUNT: 13.5 % (ref 11.5–14.5)
ETHANOL PERCENT: NORMAL G/DL
ETHANOLAMINE SERPL-MCNC: <10 MG/DL (ref 0–0.08)
FENTANYL SCREEN, URINE: ABNORMAL
GLOBULIN SER CALC-MCNC: 2.9 G/DL (ref 2.3–3.5)
GLUCOSE SERPL-MCNC: 93 MG/DL (ref 70–99)
GLUCOSE UR STRIP-MCNC: NEGATIVE MG/DL
HCT VFR BLD AUTO: 43.6 % (ref 42–52)
HDLC SERPL-MCNC: 32 MG/DL (ref 40–59)
HGB BLD-MCNC: 14.5 G/DL (ref 14–18)
HGB UR QL STRIP: NEGATIVE
KETONES UR STRIP-MCNC: NEGATIVE MG/DL
LDLC SERPL CALC-MCNC: 125 MG/DL (ref 0–129)
LEUKOCYTE ESTERASE UR QL STRIP: NEGATIVE
LYMPHOCYTES # BLD: 2.7 K/UL (ref 1–4.8)
LYMPHOCYTES NFR BLD: 28.1 %
MCH RBC QN AUTO: 31 PG (ref 27–31.3)
MCHC RBC AUTO-ENTMCNC: 33.2 % (ref 33–37)
MCV RBC AUTO: 93.5 FL (ref 79–92.2)
METHADONE UR QL SCN: ABNORMAL
MONOCYTES # BLD: 0.6 K/UL (ref 0.2–0.8)
MONOCYTES NFR BLD: 6.2 %
NEUTROPHILS # BLD: 6.2 K/UL (ref 1.4–6.5)
NEUTS SEG NFR BLD: 62.8 %
NITRITE UR QL STRIP: NEGATIVE
OPIATES UR QL SCN: ABNORMAL
OXYCODONE UR QL SCN: ABNORMAL
PCP UR QL SCN: ABNORMAL
PH UR STRIP: 6.5 [PH] (ref 5–9)
PLATELET # BLD AUTO: 287 K/UL (ref 130–400)
POTASSIUM SERPL-SCNC: 4 MEQ/L (ref 3.4–4.9)
PROPOXYPH UR QL SCN: ABNORMAL
PROT SERPL-MCNC: 7.2 G/DL (ref 6.3–8)
PROT UR STRIP-MCNC: NEGATIVE MG/DL
RBC # BLD AUTO: 4.67 M/UL (ref 4.7–6.1)
SALICYLATES SERPL-MCNC: <0.3 MG/DL (ref 15–30)
SODIUM SERPL-SCNC: 139 MEQ/L (ref 135–144)
SP GR UR STRIP: 1.01 (ref 1–1.03)
TRIGL SERPL-MCNC: 130 MG/DL (ref 0–150)
TSH SERPL-MCNC: 1.75 UIU/ML (ref 0.44–3.86)
URINE REFLEX TO CULTURE: NORMAL
UROBILINOGEN UR STRIP-ACNC: 1 E.U./DL
WBC # BLD AUTO: 9.8 K/UL (ref 4.8–10.8)

## 2023-06-12 PROCEDURE — 80061 LIPID PANEL: CPT

## 2023-06-12 PROCEDURE — 82077 ASSAY SPEC XCP UR&BREATH IA: CPT

## 2023-06-12 PROCEDURE — 1240000000 HC EMOTIONAL WELLNESS R&B

## 2023-06-12 PROCEDURE — 36415 COLL VENOUS BLD VENIPUNCTURE: CPT

## 2023-06-12 PROCEDURE — 6370000000 HC RX 637 (ALT 250 FOR IP): Performed by: STUDENT IN AN ORGANIZED HEALTH CARE EDUCATION/TRAINING PROGRAM

## 2023-06-12 PROCEDURE — 80053 COMPREHEN METABOLIC PANEL: CPT

## 2023-06-12 PROCEDURE — 80143 DRUG ASSAY ACETAMINOPHEN: CPT

## 2023-06-12 PROCEDURE — 99285 EMERGENCY DEPT VISIT HI MDM: CPT

## 2023-06-12 PROCEDURE — 84443 ASSAY THYROID STIM HORMONE: CPT

## 2023-06-12 PROCEDURE — 80307 DRUG TEST PRSMV CHEM ANLYZR: CPT

## 2023-06-12 PROCEDURE — 85025 COMPLETE CBC W/AUTO DIFF WBC: CPT

## 2023-06-12 PROCEDURE — 82550 ASSAY OF CK (CPK): CPT

## 2023-06-12 PROCEDURE — 81003 URINALYSIS AUTO W/O SCOPE: CPT

## 2023-06-12 PROCEDURE — 80179 DRUG ASSAY SALICYLATE: CPT

## 2023-06-12 RX ORDER — OLANZAPINE 5 MG/1
10 TABLET ORAL ONCE
Status: COMPLETED | OUTPATIENT
Start: 2023-06-12 | End: 2023-06-12

## 2023-06-12 RX ORDER — DIPHENHYDRAMINE HCL 25 MG
50 TABLET ORAL ONCE
Status: COMPLETED | OUTPATIENT
Start: 2023-06-12 | End: 2023-06-12

## 2023-06-12 RX ORDER — LORAZEPAM 2 MG/1
2 TABLET ORAL ONCE
Status: COMPLETED | OUTPATIENT
Start: 2023-06-12 | End: 2023-06-12

## 2023-06-12 RX ADMIN — OLANZAPINE 10 MG: 5 TABLET, FILM COATED ORAL at 20:02

## 2023-06-12 RX ADMIN — DIPHENHYDRAMINE HCL 50 MG: 25 TABLET ORAL at 20:02

## 2023-06-12 RX ADMIN — LORAZEPAM 2 MG: 2 TABLET ORAL at 20:02

## 2023-06-12 ASSESSMENT — PAIN - FUNCTIONAL ASSESSMENT: PAIN_FUNCTIONAL_ASSESSMENT: NONE - DENIES PAIN

## 2023-06-13 PROCEDURE — 99223 1ST HOSP IP/OBS HIGH 75: CPT | Performed by: STUDENT IN AN ORGANIZED HEALTH CARE EDUCATION/TRAINING PROGRAM

## 2023-06-13 PROCEDURE — 1240000000 HC EMOTIONAL WELLNESS R&B

## 2023-06-13 PROCEDURE — 6370000000 HC RX 637 (ALT 250 FOR IP): Performed by: STUDENT IN AN ORGANIZED HEALTH CARE EDUCATION/TRAINING PROGRAM

## 2023-06-13 PROCEDURE — 6370000000 HC RX 637 (ALT 250 FOR IP): Performed by: PSYCHIATRY & NEUROLOGY

## 2023-06-13 PROCEDURE — 6370000000 HC RX 637 (ALT 250 FOR IP): Performed by: REGISTERED NURSE

## 2023-06-13 RX ORDER — MAGNESIUM HYDROXIDE/ALUMINUM HYDROXICE/SIMETHICONE 120; 1200; 1200 MG/30ML; MG/30ML; MG/30ML
30 SUSPENSION ORAL PRN
Status: DISCONTINUED | OUTPATIENT
Start: 2023-06-13 | End: 2023-06-19 | Stop reason: HOSPADM

## 2023-06-13 RX ORDER — HYDROXYZINE PAMOATE 50 MG/1
50 CAPSULE ORAL EVERY 6 HOURS PRN
Status: DISCONTINUED | OUTPATIENT
Start: 2023-06-13 | End: 2023-06-19 | Stop reason: HOSPADM

## 2023-06-13 RX ORDER — ACETAMINOPHEN 325 MG/1
650 TABLET ORAL EVERY 4 HOURS PRN
Status: DISCONTINUED | OUTPATIENT
Start: 2023-06-13 | End: 2023-06-19 | Stop reason: HOSPADM

## 2023-06-13 RX ORDER — RISPERIDONE 1 MG/1
1 TABLET, ORALLY DISINTEGRATING ORAL 2 TIMES DAILY
Status: DISCONTINUED | OUTPATIENT
Start: 2023-06-13 | End: 2023-06-17

## 2023-06-13 RX ORDER — HALOPERIDOL 5 MG/1
5 TABLET ORAL EVERY 6 HOURS PRN
Status: DISCONTINUED | OUTPATIENT
Start: 2023-06-13 | End: 2023-06-19 | Stop reason: HOSPADM

## 2023-06-13 RX ORDER — HALOPERIDOL 5 MG/ML
5 INJECTION INTRAMUSCULAR EVERY 6 HOURS PRN
Status: DISCONTINUED | OUTPATIENT
Start: 2023-06-13 | End: 2023-06-19 | Stop reason: HOSPADM

## 2023-06-13 RX ORDER — HYDROXYZINE HYDROCHLORIDE 50 MG/ML
50 INJECTION, SOLUTION INTRAMUSCULAR EVERY 6 HOURS PRN
Status: DISCONTINUED | OUTPATIENT
Start: 2023-06-13 | End: 2023-06-19 | Stop reason: HOSPADM

## 2023-06-13 RX ORDER — TRAZODONE HYDROCHLORIDE 50 MG/1
50 TABLET ORAL NIGHTLY
Status: DISCONTINUED | OUTPATIENT
Start: 2023-06-13 | End: 2023-06-14

## 2023-06-13 RX ORDER — BENZTROPINE MESYLATE 1 MG/ML
2 INJECTION INTRAMUSCULAR; INTRAVENOUS 2 TIMES DAILY PRN
Status: DISCONTINUED | OUTPATIENT
Start: 2023-06-13 | End: 2023-06-19 | Stop reason: HOSPADM

## 2023-06-13 RX ADMIN — HYDROXYZINE PAMOATE 50 MG: 50 CAPSULE ORAL at 16:48

## 2023-06-13 RX ADMIN — TRAZODONE HYDROCHLORIDE 50 MG: 50 TABLET ORAL at 21:11

## 2023-06-13 RX ADMIN — NICOTINE POLACRILEX 4 MG: 4 GUM, CHEWING BUCCAL at 11:16

## 2023-06-13 RX ADMIN — HYDROXYZINE PAMOATE 50 MG: 50 CAPSULE ORAL at 22:30

## 2023-06-13 RX ADMIN — RISPERIDONE 1 MG: 1 TABLET, ORALLY DISINTEGRATING ORAL at 21:11

## 2023-06-13 RX ADMIN — HALOPERIDOL 5 MG: 5 TABLET ORAL at 22:04

## 2023-06-13 RX ADMIN — NICOTINE POLACRILEX 4 MG: 4 GUM, CHEWING BUCCAL at 16:48

## 2023-06-13 RX ADMIN — NICOTINE POLACRILEX 4 MG: 4 GUM, CHEWING BUCCAL at 13:33

## 2023-06-13 RX ADMIN — NICOTINE POLACRILEX 4 MG: 4 GUM, CHEWING BUCCAL at 22:02

## 2023-06-13 RX ADMIN — NICOTINE POLACRILEX 4 MG: 4 GUM, CHEWING BUCCAL at 08:32

## 2023-06-13 ASSESSMENT — SLEEP AND FATIGUE QUESTIONNAIRES
SLEEP PATTERN: INSOMNIA
DO YOU USE A SLEEP AID: NO
DO YOU HAVE DIFFICULTY SLEEPING: YES
AVERAGE NUMBER OF SLEEP HOURS: 1

## 2023-06-13 ASSESSMENT — LIFESTYLE VARIABLES
HOW MANY STANDARD DRINKS CONTAINING ALCOHOL DO YOU HAVE ON A TYPICAL DAY: PATIENT DOES NOT DRINK
HOW OFTEN DO YOU HAVE A DRINK CONTAINING ALCOHOL: NEVER

## 2023-06-14 PROCEDURE — 6370000000 HC RX 637 (ALT 250 FOR IP): Performed by: REGISTERED NURSE

## 2023-06-14 PROCEDURE — 99232 SBSQ HOSP IP/OBS MODERATE 35: CPT | Performed by: REGISTERED NURSE

## 2023-06-14 PROCEDURE — 1240000000 HC EMOTIONAL WELLNESS R&B

## 2023-06-14 PROCEDURE — 6370000000 HC RX 637 (ALT 250 FOR IP): Performed by: STUDENT IN AN ORGANIZED HEALTH CARE EDUCATION/TRAINING PROGRAM

## 2023-06-14 PROCEDURE — 6370000000 HC RX 637 (ALT 250 FOR IP): Performed by: PSYCHIATRY & NEUROLOGY

## 2023-06-14 RX ORDER — DIPHENHYDRAMINE HYDROCHLORIDE 50 MG/ML
25 INJECTION INTRAMUSCULAR; INTRAVENOUS ONCE
Status: COMPLETED | OUTPATIENT
Start: 2023-06-14 | End: 2023-06-14

## 2023-06-14 RX ORDER — LORAZEPAM 2 MG/1
2 TABLET ORAL ONCE
Status: COMPLETED | OUTPATIENT
Start: 2023-06-14 | End: 2023-06-14

## 2023-06-14 RX ORDER — TRAZODONE HYDROCHLORIDE 100 MG/1
100 TABLET ORAL NIGHTLY
Status: DISCONTINUED | OUTPATIENT
Start: 2023-06-14 | End: 2023-06-19 | Stop reason: HOSPADM

## 2023-06-14 RX ORDER — DIPHENHYDRAMINE HCL 25 MG
25 TABLET ORAL ONCE
Status: COMPLETED | OUTPATIENT
Start: 2023-06-14 | End: 2023-06-14

## 2023-06-14 RX ORDER — LORAZEPAM 2 MG/ML
2 INJECTION INTRAMUSCULAR ONCE
Status: COMPLETED | OUTPATIENT
Start: 2023-06-14 | End: 2023-06-14

## 2023-06-14 RX ADMIN — NICOTINE POLACRILEX 4 MG: 4 GUM, CHEWING BUCCAL at 21:23

## 2023-06-14 RX ADMIN — RISPERIDONE 1 MG: 1 TABLET, ORALLY DISINTEGRATING ORAL at 09:12

## 2023-06-14 RX ADMIN — NICOTINE POLACRILEX 4 MG: 4 GUM, CHEWING BUCCAL at 09:42

## 2023-06-14 RX ADMIN — LORAZEPAM 2 MG: 2 TABLET ORAL at 02:29

## 2023-06-14 RX ADMIN — NICOTINE POLACRILEX 4 MG: 4 GUM, CHEWING BUCCAL at 05:55

## 2023-06-14 RX ADMIN — NICOTINE POLACRILEX 4 MG: 4 GUM, CHEWING BUCCAL at 02:06

## 2023-06-14 RX ADMIN — NICOTINE POLACRILEX 4 MG: 4 GUM, CHEWING BUCCAL at 19:03

## 2023-06-14 RX ADMIN — RISPERIDONE 1 MG: 1 TABLET, ORALLY DISINTEGRATING ORAL at 21:23

## 2023-06-14 RX ADMIN — NICOTINE POLACRILEX 4 MG: 4 GUM, CHEWING BUCCAL at 11:41

## 2023-06-14 RX ADMIN — NICOTINE POLACRILEX 4 MG: 4 GUM, CHEWING BUCCAL at 13:48

## 2023-06-14 RX ADMIN — DIPHENHYDRAMINE HCL 25 MG: 25 TABLET ORAL at 02:29

## 2023-06-14 RX ADMIN — TRAZODONE HYDROCHLORIDE 100 MG: 100 TABLET ORAL at 21:23

## 2023-06-15 PROCEDURE — 1240000000 HC EMOTIONAL WELLNESS R&B

## 2023-06-15 PROCEDURE — 6370000000 HC RX 637 (ALT 250 FOR IP): Performed by: PSYCHIATRY & NEUROLOGY

## 2023-06-15 PROCEDURE — 6370000000 HC RX 637 (ALT 250 FOR IP): Performed by: STUDENT IN AN ORGANIZED HEALTH CARE EDUCATION/TRAINING PROGRAM

## 2023-06-15 PROCEDURE — 99232 SBSQ HOSP IP/OBS MODERATE 35: CPT | Performed by: REGISTERED NURSE

## 2023-06-15 PROCEDURE — 6370000000 HC RX 637 (ALT 250 FOR IP): Performed by: REGISTERED NURSE

## 2023-06-15 RX ADMIN — HALOPERIDOL 5 MG: 5 TABLET ORAL at 10:49

## 2023-06-15 RX ADMIN — NICOTINE POLACRILEX 4 MG: 4 GUM, CHEWING BUCCAL at 17:30

## 2023-06-15 RX ADMIN — TRAZODONE HYDROCHLORIDE 100 MG: 100 TABLET ORAL at 20:50

## 2023-06-15 RX ADMIN — HYDROXYZINE PAMOATE 50 MG: 50 CAPSULE ORAL at 10:49

## 2023-06-15 RX ADMIN — RISPERIDONE 1 MG: 1 TABLET, ORALLY DISINTEGRATING ORAL at 20:50

## 2023-06-15 RX ADMIN — NICOTINE POLACRILEX 4 MG: 4 GUM, CHEWING BUCCAL at 13:54

## 2023-06-15 RX ADMIN — RISPERIDONE 1 MG: 1 TABLET, ORALLY DISINTEGRATING ORAL at 09:19

## 2023-06-15 RX ADMIN — NICOTINE POLACRILEX 4 MG: 4 GUM, CHEWING BUCCAL at 10:34

## 2023-06-15 RX ADMIN — HYDROXYZINE PAMOATE 50 MG: 50 CAPSULE ORAL at 17:51

## 2023-06-15 RX ADMIN — HYDROXYZINE PAMOATE 50 MG: 50 CAPSULE ORAL at 03:41

## 2023-06-15 RX ADMIN — NICOTINE POLACRILEX 4 MG: 4 GUM, CHEWING BUCCAL at 20:49

## 2023-06-16 PROCEDURE — 6370000000 HC RX 637 (ALT 250 FOR IP): Performed by: PSYCHIATRY & NEUROLOGY

## 2023-06-16 PROCEDURE — 6370000000 HC RX 637 (ALT 250 FOR IP): Performed by: REGISTERED NURSE

## 2023-06-16 PROCEDURE — 6370000000 HC RX 637 (ALT 250 FOR IP): Performed by: STUDENT IN AN ORGANIZED HEALTH CARE EDUCATION/TRAINING PROGRAM

## 2023-06-16 PROCEDURE — 1240000000 HC EMOTIONAL WELLNESS R&B

## 2023-06-16 PROCEDURE — 90833 PSYTX W PT W E/M 30 MIN: CPT | Performed by: REGISTERED NURSE

## 2023-06-16 PROCEDURE — 99232 SBSQ HOSP IP/OBS MODERATE 35: CPT | Performed by: REGISTERED NURSE

## 2023-06-16 PROCEDURE — 93005 ELECTROCARDIOGRAM TRACING: CPT | Performed by: REGISTERED NURSE

## 2023-06-16 RX ORDER — OXCARBAZEPINE 150 MG/1
150 TABLET, FILM COATED ORAL 2 TIMES DAILY
Status: DISCONTINUED | OUTPATIENT
Start: 2023-06-16 | End: 2023-06-19 | Stop reason: HOSPADM

## 2023-06-16 RX ADMIN — TRAZODONE HYDROCHLORIDE 100 MG: 100 TABLET ORAL at 21:00

## 2023-06-16 RX ADMIN — NICOTINE POLACRILEX 4 MG: 4 GUM, CHEWING BUCCAL at 09:40

## 2023-06-16 RX ADMIN — OXCARBAZEPINE 150 MG: 150 TABLET, FILM COATED ORAL at 21:00

## 2023-06-16 RX ADMIN — NICOTINE POLACRILEX 4 MG: 4 GUM, CHEWING BUCCAL at 21:00

## 2023-06-16 RX ADMIN — HYDROXYZINE PAMOATE 50 MG: 50 CAPSULE ORAL at 14:17

## 2023-06-16 RX ADMIN — NICOTINE POLACRILEX 4 MG: 4 GUM, CHEWING BUCCAL at 14:18

## 2023-06-16 RX ADMIN — RISPERIDONE 1 MG: 1 TABLET, ORALLY DISINTEGRATING ORAL at 08:05

## 2023-06-16 RX ADMIN — OXCARBAZEPINE 150 MG: 150 TABLET, FILM COATED ORAL at 10:44

## 2023-06-16 RX ADMIN — RISPERIDONE 1 MG: 1 TABLET, ORALLY DISINTEGRATING ORAL at 21:00

## 2023-06-17 PROCEDURE — 1240000000 HC EMOTIONAL WELLNESS R&B

## 2023-06-17 PROCEDURE — 6370000000 HC RX 637 (ALT 250 FOR IP): Performed by: REGISTERED NURSE

## 2023-06-17 PROCEDURE — 6370000000 HC RX 637 (ALT 250 FOR IP): Performed by: NURSE PRACTITIONER

## 2023-06-17 PROCEDURE — 6370000000 HC RX 637 (ALT 250 FOR IP): Performed by: PSYCHIATRY & NEUROLOGY

## 2023-06-17 PROCEDURE — 6370000000 HC RX 637 (ALT 250 FOR IP): Performed by: STUDENT IN AN ORGANIZED HEALTH CARE EDUCATION/TRAINING PROGRAM

## 2023-06-17 RX ORDER — BENZTROPINE MESYLATE 0.5 MG/1
0.5 TABLET ORAL 2 TIMES DAILY
Status: DISCONTINUED | OUTPATIENT
Start: 2023-06-17 | End: 2023-06-19 | Stop reason: HOSPADM

## 2023-06-17 RX ADMIN — NICOTINE POLACRILEX 4 MG: 4 GUM, CHEWING BUCCAL at 19:54

## 2023-06-17 RX ADMIN — HYDROXYZINE PAMOATE 50 MG: 50 CAPSULE ORAL at 08:21

## 2023-06-17 RX ADMIN — RISPERIDONE 1 MG: 1 TABLET, ORALLY DISINTEGRATING ORAL at 08:04

## 2023-06-17 RX ADMIN — OXCARBAZEPINE 150 MG: 150 TABLET, FILM COATED ORAL at 08:04

## 2023-06-17 RX ADMIN — NICOTINE POLACRILEX 4 MG: 4 GUM, CHEWING BUCCAL at 15:19

## 2023-06-17 RX ADMIN — OXCARBAZEPINE 150 MG: 150 TABLET, FILM COATED ORAL at 21:01

## 2023-06-17 RX ADMIN — BENZTROPINE MESYLATE 0.5 MG: 0.5 TABLET ORAL at 15:18

## 2023-06-17 RX ADMIN — BENZTROPINE MESYLATE 0.5 MG: 0.5 TABLET ORAL at 21:01

## 2023-06-17 RX ADMIN — TRAZODONE HYDROCHLORIDE 100 MG: 100 TABLET ORAL at 21:01

## 2023-06-17 RX ADMIN — NICOTINE POLACRILEX 4 MG: 4 GUM, CHEWING BUCCAL at 09:10

## 2023-06-18 PROCEDURE — 6370000000 HC RX 637 (ALT 250 FOR IP): Performed by: REGISTERED NURSE

## 2023-06-18 PROCEDURE — 1240000000 HC EMOTIONAL WELLNESS R&B

## 2023-06-18 PROCEDURE — 6370000000 HC RX 637 (ALT 250 FOR IP): Performed by: NURSE PRACTITIONER

## 2023-06-18 RX ADMIN — NICOTINE POLACRILEX 4 MG: 4 GUM, CHEWING BUCCAL at 16:17

## 2023-06-18 RX ADMIN — OXCARBAZEPINE 150 MG: 150 TABLET, FILM COATED ORAL at 08:13

## 2023-06-18 RX ADMIN — NICOTINE POLACRILEX 4 MG: 4 GUM, CHEWING BUCCAL at 11:22

## 2023-06-18 RX ADMIN — BENZTROPINE MESYLATE 0.5 MG: 0.5 TABLET ORAL at 21:10

## 2023-06-18 RX ADMIN — OXCARBAZEPINE 150 MG: 150 TABLET, FILM COATED ORAL at 21:10

## 2023-06-18 RX ADMIN — BENZTROPINE MESYLATE 0.5 MG: 0.5 TABLET ORAL at 08:13

## 2023-06-18 RX ADMIN — TRAZODONE HYDROCHLORIDE 100 MG: 100 TABLET ORAL at 21:10

## 2023-06-19 VITALS
OXYGEN SATURATION: 100 % | BODY MASS INDEX: 19.37 KG/M2 | DIASTOLIC BLOOD PRESSURE: 75 MMHG | TEMPERATURE: 97.7 F | RESPIRATION RATE: 18 BRPM | SYSTOLIC BLOOD PRESSURE: 120 MMHG | HEART RATE: 99 BPM | HEIGHT: 70 IN

## 2023-06-19 LAB
EKG ATRIAL RATE: 83 BPM
EKG P AXIS: 67 DEGREES
EKG P-R INTERVAL: 160 MS
EKG Q-T INTERVAL: 380 MS
EKG QRS DURATION: 100 MS
EKG QTC CALCULATION (BAZETT): 446 MS
EKG R AXIS: 43 DEGREES
EKG T AXIS: 35 DEGREES
EKG VENTRICULAR RATE: 83 BPM

## 2023-06-19 PROCEDURE — 6370000000 HC RX 637 (ALT 250 FOR IP): Performed by: REGISTERED NURSE

## 2023-06-19 PROCEDURE — 99239 HOSP IP/OBS DSCHRG MGMT >30: CPT | Performed by: STUDENT IN AN ORGANIZED HEALTH CARE EDUCATION/TRAINING PROGRAM

## 2023-06-19 PROCEDURE — 6370000000 HC RX 637 (ALT 250 FOR IP): Performed by: NURSE PRACTITIONER

## 2023-06-19 RX ORDER — OXCARBAZEPINE 150 MG/1
150 TABLET, FILM COATED ORAL 2 TIMES DAILY
Qty: 60 TABLET | Refills: 0 | Status: SHIPPED | OUTPATIENT
Start: 2023-06-19 | End: 2023-07-19

## 2023-06-19 RX ORDER — TRAZODONE HYDROCHLORIDE 100 MG/1
100 TABLET ORAL NIGHTLY
Qty: 30 TABLET | Refills: 0 | Status: SHIPPED | OUTPATIENT
Start: 2023-06-19 | End: 2023-07-19

## 2023-06-19 RX ADMIN — BENZTROPINE MESYLATE 0.5 MG: 0.5 TABLET ORAL at 09:00

## 2023-06-19 RX ADMIN — OXCARBAZEPINE 150 MG: 150 TABLET, FILM COATED ORAL at 09:00

## 2023-06-19 RX ADMIN — NICOTINE POLACRILEX 4 MG: 4 GUM, CHEWING BUCCAL at 09:00

## 2023-06-19 NOTE — GROUP NOTE
Group Therapy Note    Date: 6/18/2023    Group Start Time: 2000  Group End Time: 2045  Group Topic: Recreational    [facility]    Fabricio Zimmerman RN; Av Sanchez RN    To attend recreational group- playing Wii with peers. Group Therapy Note    Attendees: 5/20         Notes:  Pt attended group and actively participated in [employer] with peers. Status After Intervention:  Improved    Participation Level:  Active Listener and Interactive    Participation Quality: Appropriate and Attentive      Speech:  normal      Thought Process/Content: Logical      Affective Functioning: Congruent      Mood: euthymic      Level of consciousness:  Alert, Oriented x4, and Attentive      Response to Learning: Able to verbalize/acknowledge new learning and Able to retain information      Endings: None Reported    Modes of Intervention: Education, Support, Activity, Movement, and Media      Discipline Responsible: Registered Nurse      Signature:  Fabricio Zimmerman RN

## 2023-06-19 NOTE — FLOWSHEET NOTE
Pt has been visible out on the unit walking with a peer. He attended the Psychoeducation group and his goal was \" to shower and get my clothes\". He endorses improved sleep and appetite and denies  SI,HI,AVH.

## 2023-06-19 NOTE — PROGRESS NOTES
CLINICAL PHARMACY NOTE: MEDS TO BEDS    Total # of Prescriptions Filled: 2   The following medications were delivered to the patient:  Trazodone 100 mg Tab  Oxcarbazepine 150 mg Tab    Additional Documentation:
chill\" \"Electronically signed by Narciso Osei, [address] on 6/19/2023 at 2:14 PM

## 2023-06-19 NOTE — FLOWSHEET NOTE
Reviewed discharge instructions with patient. Pt. Verbalized understanding. Denies SI/HI/AVH. Ambulatory off unit with meds to beds. Informed that perseris was sent to [address]

## 2023-06-19 NOTE — PLAN OF CARE
Problem: Sleep Disturbance  Goal: Will exhibit normal sleeping pattern  Description: INTERVENTIONS:  1. Administer medication as ordered  2. Decrease environmental stimuli, including noise, as appropriate  3. Discourage social isolation and naps during the day  Outcome: Progressing     Problem: Risk for Elopement  Goal: Patient will not exit the unit/facility without proper excort  Description: Patient will not exit the unit/facility without proper escort. Outcome: Adequate for Discharge     Problem: Tuyet  Goal: Will exhibit normal sleep and speech and no impulsivity  Description: INTERVENTIONS:  1. Administer medication as ordered  2. Set limits on impulsive behavior  3. Make attempts to decrease external stimuli as possible  Outcome: Adequate for Discharge     Problem: Psychosis  Goal: Will report no hallucinations or delusions  Description: INTERVENTIONS:  1. Administer medication as ordered  2. Assist with reality testing to support increasing orientation  3. Assess if patient's hallucinations or delusions are encouraging self harm or harm to others and intervene as appropriate  Outcome: Adequate for Discharge     Problem: Behavior  Goal: Pt/Family maintain appropriate behavior and adhere to behavioral management agreement, if implemented  Description: INTERVENTIONS:  1. Assess patient/family's coping skills and  non-compliant behavior (including use of illegal substances)  2. Notify security of behavior or suspected illegal substances which indicate the need for search of the family and/or belongings  3. Encourage verbalization of thoughts and concerns in a socially appropriate manner  4. Utilize positive, consistent limit setting strategies supporting safety of patient, staff and others  5. Encourage participation in the decision making process about the behavioral management agreement  6. If a visitor's behavior poses a threat to safety call refer to organization policy.   7. Initiate consult with Social

## 2023-06-19 NOTE — DISCHARGE SUMMARY
2023     traZODone 100 MG tablet  Commonly known as: DESYREL  Take 1 tablet by mouth nightly            STOP taking these medications      ARIPiprazole 15 MG tablet  Commonly known as: ABILIFY     divalproex 250 MG DR tablet  Commonly known as: DEPAKOTE               Where to Get Your Medications        These medications were sent to [address], [address] [place] [address]      Phone: [phone]   OXcarbazepine 150 MG tablet  risperiDONE  MG Prsy subcutaneous injection  traZODone 100 MG tablet           Reason for more than one antipsychotic:   [x] N/A  [] 3 failed monotherapy(drugs tried):  [] Cross over to a new antipsychotic  [] Taper to monotherapy from polypharmacy  [] Augmentation of Clozapine therapy due to treatment resistance to single therapy        TIME SPEND - 35 MINUTES TO COMPLETE THE EVALUATION, DISCHARGE SUMMARY, MEDICATION RECONCILIATION AND FOLLOW UP CARE     Signed:  Festus Landa MD  6/19/2023  5:44 PM

## 2023-06-19 NOTE — DISCHARGE INSTR - DIET

## 2023-08-09 NOTE — GROUP NOTE
Group Therapy Note    Date: 3/3/2021    Group Start Time: 0402  Group End Time: 1659  Group Topic: Recreational    [facility] RONNIE Lobato Her        Group Therapy Note    Attendees: 8/13         Patient's Goal:  To play Heads Up with the group. Notes:  Patient played the game with peers.     Status After Intervention:  Improved    Participation Level: Interactive    Participation Quality: Appropriate and Attentive      Speech:  normal      Thought Process/Content: Logical      Affective Functioning: Congruent      Mood: euthymic      Level of consciousness:  Alert and Attentive      Response to Learning: Progressing to goal      Endings: None Reported    Modes of Intervention: Activity      Discipline Responsible: Xceedium      Signature:  Luis Alfredo Quiñonez PT Re-Evaluation  and PT Discharge    Today's date: 8/10/2023  Patient name: Linda Terry  : 1969  MRN: [id number]  Referring provider: Marjorie Redd DO  Dx:   Encounter Diagnosis     ICD-10-CM    1. Acute pain of left shoulder  M25.512                      Assessment  Assessment details: Linda Terry is a 47 y.o. female with a history of allergies, anxiety, asthma, thyroid disease, eczema, GERD, and obesity that presents for a physical therapy RE-evaluation /  DISCHARGE. The patient demonstrates signs and symptoms consistent with acute L shoulder pain; posterior cervical derangement. During the examination the patient demonstrated improved L UE strength, improved L shoulder/cervical ROM, improved activity tolerance, and decreased L shoulder pain. The patient has made functional gains since starting therapy. The patient is now able to turn her head with no difficulty. The patient is able to sleep without disturbance. The patient is now able to bear weight through the L arm. The patient is lifting/carrying items without difficulty. The patient continues to have difficulty with lying directly on her L shoulder. The patient has achieved her goals of therapy. The patient will be discharged from formal PT to an independent HEP. Symptom irritability: lowUnderstanding of Dx/Px/POC: good   Prognosis: good    Goals  STG: ACHIEVE in 4 -6 weeks  1. Improve L shoulder pain at worst by 50% to improve ADL's. MET 8/10  2. Patient's cervical ROM improve to "minimal restriction" all motions tested to allow for function ROM with ADL's and driving. MET 8/10  3. Patient's shoulder/cervical MMT improve by 1/2-1 muscle grade to allow for completion of over head activities without difficulty. MET 8/10    LTG:  Achieve in 8-12 weeks  1. Patient return to all activities without pain greater than 0-1/10 in L shoulder to achieve their personal goal.MET 8/10  2.   Patient's L shoulder strength return to equal for both sides to lift/carry items without pain/difficulty. MET 8/10  3. Patient's L shoulder ROM improve to WNL to perform all ADL's and overhead tasks without difficulty. MET 8/10  4. Patient to be independent with home exercise plan at time of discharge. MET 8/10  5. Patient's shoulder FOTO score improve to > 70% to indicate a return to normal function. MET 8/10             Plan  Plan details: D/C PT TO AN INDEPENDENT [facility]  Patient would benefit from: skilled physical therapy  Treatment plan discussed with: PTA and patient        Subjective Evaluation    History of Present Illness  Date of onset: 6/13/2023  Mechanism of injury: SUBJECTIVE: 8/10/23: The patient reports improvement since starting therapy. The patient is now able to turn her head with no difficulty. The patient is able to sleep without disturbance. The patient is now able to bear weight through the L arm. The patient is lifting/carrying items without difficulty. The patient continues to have difficulty with lying directly on her L shoulder. The patient has achieved her goals of therapy. The patient will be discharged from formal PT to an independent [facility]. INJURY HISTORY: Emily Montes is a 47 y.o. female that presents to outpatient physical therapy with complaints of L shoulder pain. The patient reports insidious onset of L shoulder pain and difficulty lying on her L shoulder. The patient notes difficulty with lifting heavy activities. The patient notes difficulty with reaching behind her back and weight bearing through the L arm. The patient notes onset of intermittent tingling at her L hand( median N. Distribution) with the onset of L shoulder pain. The patient's main goal for physical therapy is to get rid of pain at her L shoulder.        L shoulder X-ray: FINDINGS:  Patito Irena is no acute fracture or dislocation.   Mild osteoarthritis of the glenohumeral and acromioclavicular joints.   No lytic or blastic osseous lesion.  [facility] calcifications along the superior and lateral humeral head which may be indicative of calcific tendinitis.     IMPRESSION:   No acute osseous abnormality.   Calcific tendinitis.   Workstation performed: [id number]          Not a recurrent problem   Patient Goals  Patient goal: decrease shoulder pain ( PARTIALLY MET)  Pain  Current pain ratin  At best pain ratin  At worst pain rating: 3  Location: L shoulder  Quality: dull ache  Aggravating factors: lifting and overhead activity  Progression: no change    Social Support  Lives in: multiple-level home  Lives with: significant other (sister)    Employment status: working ([employer])  Hand dominance: ambidextrous    Treatments  Previous treatment: physical therapy        Objective     Palpation   Left   Tenderness of the teres minor. Tenderness     Left Shoulder   Tenderness in the acromion, infraspinatus tendon and lateral scapula.      Cervical/Thoracic Screen   Cervical range of motion within normal limits with the following exceptions: Cervical:  Protrusion: NIL loss  Retraction: min loss ( IMPROVED)  Flexion: MIN loss  EXT: min loss ( IMPROVED)  ROT: L: 70 deg  R: 70 deg   Lat Flex: L:mod loss R: mod loss      Neurological Testing     Sensation     Shoulder   Left Shoulder   Intact: light touch    Right Shoulder   Intact: light touch    Reflexes   Left   Biceps (C5/C6): normal (2+)  Brachioradialis (C6): brisk (3+)  Delgadillo's reflex: negative    Right   Biceps (C5/C6): normal (2+)  Brachioradialis (C6): brisk (3+)  Delgadillo's reflex: negative    Active Range of Motion   Left Shoulder   Flexion: 155 degrees   Extension: 20 degrees with pain  Abduction: 160 degrees   Adduction: 25 degrees   External rotation 0°: 70 degrees   External rotation BTH: T2   Internal rotation BTB: T8     Right Shoulder   Flexion: 170 degrees   Extension: 72 degrees   Abduction: 165 degrees   Adduction: 35 degrees   External rotation 0°: 60 degrees   External rotation BTH: T2   Internal rotation BTB: T8     Additional Active Range of Motion Details  EXT improved to 50 deg after EXT stretch on table    Strength/Myotome Testing     Left Shoulder     Planes of Motion   Flexion: 5   Abduction: 5   External rotation at 0°: 4+   Internal rotation at 0°: 5     Right Shoulder     Planes of Motion   Flexion: 5   Abduction: 5   External rotation at 0°: 5   Internal rotation at 0°: 5     Additional Strength Details  IMPROVED    Tests     Additional Tests Details  FOTO: 83% ( predicted 66%)    PATIENT"S L SHOULDER ROM AND STRENGTH IMPROVED AFTER PERFORMING REPEATED CERVICAL RETRACTIONS AND EXTENSIONS               Precautions: anxiety    Re-evaluation: 8/10  BASELINE:  LIMITED L SHOULDER FLEX/ABD ROM AND STRENGTH  Shoulder Specialty Daily Treatment Diary   [id number]    Manual  8/10 7/17 7/20 7/31 8/3   STM/MFR shoulder Marvia Pata  /stretching                GH jt mobs        Pec Minor stretch                    Therapeutic Exercise 8/10 7/17 7/20 7/31 8/3 8/7   UBE for muscle endurance x6 mins 120/20 x 4 mins /20 x 4 mins /20 x4 min alt 100/70 x 6 mins 3'3'   Pulleys         Shoulder isometrics (flex, ext, abd, IR, ER)         TB sh. IR, ER  * Red 10x bilat red x10 ea B/L  GRN x15 ea GRN x15 ea   Table slides flex/abd/ER         T-spine extension  Towel roll x10 Towel roll x10 Towel roll x10 1/2 roll x10 1/2 roll x10            Shoulder Cane EXT Cane 1x10  Counter 1x10  10x with scapular retraction and mirror Mirror x10 Mirror x10 Mirror x10   Shoulder IR stretch                  Prone rows / extension / horizontal abduction                                    Upper Trap Stretch    :30x3 :30x3 :30x3   Levator Scap Stretch    :30x3  :30x3   SCM stretch    :30x3     Neuro Re-ed         Chin Tucks x10 1x10 1x10 x10 x10 x10   CHIN TUCK with EXT x10 2x10 2x10 with intermittent scap retraction 2x10 intermittent scap retraction (x10 w/towel) 2x10  Last 2-3 reps head shake O.P. 2 x 10 Last 2-3 reps head shake O.P.   Cool's loop ER elevation w/ mirror         MTP/LTP reviewed * Red MTP 2x10 Red MTP x20  LTP x10 RED x20 ea RED x20 ea   YTI wall slide with lift off  Attempted without success       PNF D1/D2  * mirror       Towel roll education / posture education  DONE AD sleep positioning       Posture correction  x10 :10x10 :10x10 10x:10    Scapular retraction w/ ER reviewed x10 10x mirror Mirror x10 Mirror x10 Mirror x 10   Prone scapular retraction    10x x10     Therapeutic Activity         Crate carry         3 height crate lifts                  Cones in Shelf             Modalities        CP shoulder

## 2025-04-22 ENCOUNTER — HOSPITAL ENCOUNTER (INPATIENT)
Age: 30
LOS: 6 days | Discharge: HOME | DRG: 761 | End: 2025-04-28
Attending: PSYCHIATRY & NEUROLOGY | Admitting: PSYCHIATRY & NEUROLOGY
Payer: MEDICAID

## 2025-04-22 DIAGNOSIS — F10.29 ALCOHOL DEPENDENCE WITH UNSPECIFIED ALCOHOL-INDUCED DISORDER (HCC): ICD-10-CM

## 2025-04-22 DIAGNOSIS — F25.0 SCHIZOAFFECTIVE DISORDER, BIPOLAR TYPE (HCC): Primary | ICD-10-CM

## 2025-04-22 DIAGNOSIS — F23 ACUTE PSYCHOSIS (HCC): ICD-10-CM

## 2025-04-22 LAB
ALBUMIN SERPL-MCNC: 4.7 G/DL (ref 3.5–4.6)
ALP SERPL-CCNC: 117 U/L (ref 35–104)
ALT SERPL-CCNC: 7 U/L (ref 0–41)
AMPHET UR QL SCN: ABNORMAL
ANION GAP SERPL CALCULATED.3IONS-SCNC: 13 MEQ/L (ref 9–15)
APAP SERPL-MCNC: <5 UG/ML (ref 10–30)
AST SERPL-CCNC: 11 U/L (ref 0–40)
BACTERIA URNS QL MICRO: NEGATIVE /HPF
BARBITURATES UR QL SCN: ABNORMAL
BASOPHILS # BLD: 0.1 K/UL (ref 0–0.2)
BASOPHILS NFR BLD: 0.5 %
BENZODIAZ UR QL SCN: ABNORMAL
BILIRUB SERPL-MCNC: 1.3 MG/DL (ref 0.2–0.7)
BILIRUB UR QL STRIP: NEGATIVE
BUN SERPL-MCNC: 12 MG/DL (ref 6–20)
CALCIUM SERPL-MCNC: 10 MG/DL (ref 8.5–9.9)
CANNABINOIDS UR QL SCN: POSITIVE
CHLORIDE SERPL-SCNC: 99 MEQ/L (ref 95–107)
CHOLEST SERPL-MCNC: 247 MG/DL (ref 0–199)
CK SERPL-CCNC: 60 U/L (ref 0–190)
CLARITY UR: CLEAR
CO2 SERPL-SCNC: 26 MEQ/L (ref 20–31)
COCAINE UR QL SCN: ABNORMAL
COLOR UR: ABNORMAL
CREAT SERPL-MCNC: 0.91 MG/DL (ref 0.7–1.2)
DRUG SCREEN COMMENT UR-IMP: ABNORMAL
EOSINOPHIL # BLD: 0.1 K/UL (ref 0–0.7)
EOSINOPHIL NFR BLD: 0.9 %
EPI CELLS #/AREA URNS AUTO: NORMAL /HPF (ref 0–5)
ERYTHROCYTE [DISTWIDTH] IN BLOOD BY AUTOMATED COUNT: 13.2 % (ref 11.5–14.5)
ESTIMATED AVERAGE GLUCOSE: 97 MG/DL
ETHANOL PERCENT: NORMAL G/DL
ETHANOLAMINE SERPL-MCNC: <10 MG/DL (ref 0–0.08)
FENTANYL SCREEN, URINE: ABNORMAL
GLOBULIN SER CALC-MCNC: 3.6 G/DL (ref 2.3–3.5)
GLUCOSE SERPL-MCNC: 102 MG/DL (ref 70–99)
GLUCOSE UR STRIP-MCNC: NEGATIVE MG/DL
HBA1C MFR BLD: 5 % (ref 4–6)
HCT VFR BLD AUTO: 50.7 % (ref 42–52)
HDLC SERPL-MCNC: 45 MG/DL (ref 40–59)
HGB BLD-MCNC: 16.7 G/DL (ref 14–18)
HGB UR QL STRIP: NEGATIVE
HYALINE CASTS #/AREA URNS AUTO: NORMAL /HPF (ref 0–5)
KETONES UR STRIP-MCNC: 15 MG/DL
LDLC SERPL CALC-MCNC: 189 MG/DL (ref 0–129)
LEUKOCYTE ESTERASE UR QL STRIP: ABNORMAL
LYMPHOCYTES # BLD: 1.5 K/UL (ref 1–4.8)
LYMPHOCYTES NFR BLD: 10.8 %
MCH RBC QN AUTO: 31.6 PG (ref 27–31.3)
MCHC RBC AUTO-ENTMCNC: 32.9 % (ref 33–37)
MCV RBC AUTO: 95.8 FL (ref 79–92.2)
METHADONE UR QL SCN: ABNORMAL
MONOCYTES # BLD: 0.5 K/UL (ref 0.2–0.8)
MONOCYTES NFR BLD: 3.4 %
NEUTROPHILS # BLD: 11.3 K/UL (ref 1.4–6.5)
NEUTS SEG NFR BLD: 84 %
NITRITE UR QL STRIP: NEGATIVE
OPIATES UR QL SCN: ABNORMAL
OXYCODONE UR QL SCN: ABNORMAL
PCP UR QL SCN: ABNORMAL
PH UR STRIP: 7 [PH] (ref 5–9)
PLATELET # BLD AUTO: 278 K/UL (ref 130–400)
POTASSIUM SERPL-SCNC: 4.9 MEQ/L (ref 3.4–4.9)
PROPOXYPH UR QL SCN: ABNORMAL
PROT SERPL-MCNC: 8.3 G/DL (ref 6.3–8)
PROT UR STRIP-MCNC: NEGATIVE MG/DL
RBC # BLD AUTO: 5.29 M/UL (ref 4.7–6.1)
RBC #/AREA URNS AUTO: NORMAL /HPF (ref 0–5)
SALICYLATES SERPL-MCNC: <0.3 MG/DL (ref 15–30)
SODIUM SERPL-SCNC: 138 MEQ/L (ref 135–144)
SP GR UR STRIP: 1.01 (ref 1–1.03)
TRIGL SERPL-MCNC: 67 MG/DL (ref 0–150)
TSH SERPL-MCNC: 1.23 UIU/ML (ref 0.44–3.86)
URINE REFLEX TO CULTURE: ABNORMAL
UROBILINOGEN UR STRIP-ACNC: 0.2 E.U./DL
WBC # BLD AUTO: 13.4 K/UL (ref 4.8–10.8)
WBC #/AREA URNS AUTO: NORMAL /HPF (ref 0–5)

## 2025-04-22 PROCEDURE — 90792 PSYCH DIAG EVAL W/MED SRVCS: CPT | Performed by: NURSE PRACTITIONER

## 2025-04-22 PROCEDURE — 80179 DRUG ASSAY SALICYLATE: CPT

## 2025-04-22 PROCEDURE — 6370000000 HC RX 637 (ALT 250 FOR IP): Performed by: PHYSICIAN ASSISTANT

## 2025-04-22 PROCEDURE — 81001 URINALYSIS AUTO W/SCOPE: CPT

## 2025-04-22 PROCEDURE — 80307 DRUG TEST PRSMV CHEM ANLYZR: CPT

## 2025-04-22 PROCEDURE — 99285 EMERGENCY DEPT VISIT HI MDM: CPT

## 2025-04-22 PROCEDURE — 80053 COMPREHEN METABOLIC PANEL: CPT

## 2025-04-22 PROCEDURE — 84443 ASSAY THYROID STIM HORMONE: CPT

## 2025-04-22 PROCEDURE — 1240000000 HC EMOTIONAL WELLNESS R&B

## 2025-04-22 PROCEDURE — 85025 COMPLETE CBC W/AUTO DIFF WBC: CPT

## 2025-04-22 PROCEDURE — 82077 ASSAY SPEC XCP UR&BREATH IA: CPT

## 2025-04-22 PROCEDURE — 80143 DRUG ASSAY ACETAMINOPHEN: CPT

## 2025-04-22 PROCEDURE — 83036 HEMOGLOBIN GLYCOSYLATED A1C: CPT

## 2025-04-22 PROCEDURE — 82550 ASSAY OF CK (CPK): CPT

## 2025-04-22 PROCEDURE — 93005 ELECTROCARDIOGRAM TRACING: CPT | Performed by: PHYSICIAN ASSISTANT

## 2025-04-22 PROCEDURE — 36415 COLL VENOUS BLD VENIPUNCTURE: CPT

## 2025-04-22 PROCEDURE — 6370000000 HC RX 637 (ALT 250 FOR IP): Performed by: PSYCHIATRY & NEUROLOGY

## 2025-04-22 PROCEDURE — 80061 LIPID PANEL: CPT

## 2025-04-22 RX ORDER — TRAZODONE HYDROCHLORIDE 50 MG/1
50 TABLET ORAL NIGHTLY PRN
Status: DISCONTINUED | OUTPATIENT
Start: 2025-04-22 | End: 2025-04-25

## 2025-04-22 RX ORDER — LORAZEPAM 2 MG/1
2 TABLET ORAL
Status: DISCONTINUED | OUTPATIENT
Start: 2025-04-22 | End: 2025-04-28 | Stop reason: HOSPADM

## 2025-04-22 RX ORDER — LORAZEPAM 2 MG/ML
4 INJECTION INTRAMUSCULAR
Status: DISCONTINUED | OUTPATIENT
Start: 2025-04-22 | End: 2025-04-28 | Stop reason: HOSPADM

## 2025-04-22 RX ORDER — MAGNESIUM HYDROXIDE/ALUMINUM HYDROXICE/SIMETHICONE 120; 1200; 1200 MG/30ML; MG/30ML; MG/30ML
30 SUSPENSION ORAL PRN
Status: DISCONTINUED | OUTPATIENT
Start: 2025-04-22 | End: 2025-04-28 | Stop reason: HOSPADM

## 2025-04-22 RX ORDER — HYDROXYZINE HYDROCHLORIDE 50 MG/ML
50 INJECTION, SOLUTION INTRAMUSCULAR EVERY 6 HOURS PRN
Status: DISCONTINUED | OUTPATIENT
Start: 2025-04-22 | End: 2025-04-28 | Stop reason: HOSPADM

## 2025-04-22 RX ORDER — LANOLIN ALCOHOL/MO/W.PET/CERES
100 CREAM (GRAM) TOPICAL DAILY
Status: DISCONTINUED | OUTPATIENT
Start: 2025-04-22 | End: 2025-04-28 | Stop reason: HOSPADM

## 2025-04-22 RX ORDER — ONDANSETRON 4 MG/1
4 TABLET, ORALLY DISINTEGRATING ORAL EVERY 6 HOURS PRN
Status: DISCONTINUED | OUTPATIENT
Start: 2025-04-22 | End: 2025-04-28 | Stop reason: HOSPADM

## 2025-04-22 RX ORDER — LORAZEPAM 1 MG/1
1 TABLET ORAL EVERY 4 HOURS PRN
Status: DISCONTINUED | OUTPATIENT
Start: 2025-04-22 | End: 2025-04-28 | Stop reason: HOSPADM

## 2025-04-22 RX ORDER — LORAZEPAM 0.5 MG/1
0.5 TABLET ORAL EVERY 4 HOURS PRN
Status: DISCONTINUED | OUTPATIENT
Start: 2025-04-22 | End: 2025-04-28 | Stop reason: HOSPADM

## 2025-04-22 RX ORDER — ACETAMINOPHEN 325 MG/1
650 TABLET ORAL EVERY 4 HOURS PRN
Status: DISCONTINUED | OUTPATIENT
Start: 2025-04-22 | End: 2025-04-28 | Stop reason: HOSPADM

## 2025-04-22 RX ORDER — HALOPERIDOL 5 MG/1
5 TABLET ORAL ONCE
Status: COMPLETED | OUTPATIENT
Start: 2025-04-22 | End: 2025-04-22

## 2025-04-22 RX ORDER — HALOPERIDOL 5 MG/ML
5 INJECTION INTRAMUSCULAR EVERY 6 HOURS PRN
Status: DISCONTINUED | OUTPATIENT
Start: 2025-04-22 | End: 2025-04-28 | Stop reason: HOSPADM

## 2025-04-22 RX ORDER — MULTIVITAMIN WITH IRON
1 TABLET ORAL DAILY
Status: DISCONTINUED | OUTPATIENT
Start: 2025-04-22 | End: 2025-04-28 | Stop reason: HOSPADM

## 2025-04-22 RX ORDER — FOLIC ACID 1 MG/1
1 TABLET ORAL DAILY
Status: DISCONTINUED | OUTPATIENT
Start: 2025-04-22 | End: 2025-04-28 | Stop reason: HOSPADM

## 2025-04-22 RX ORDER — HYDROXYZINE PAMOATE 50 MG/1
50 CAPSULE ORAL EVERY 6 HOURS PRN
Status: DISCONTINUED | OUTPATIENT
Start: 2025-04-22 | End: 2025-04-28 | Stop reason: HOSPADM

## 2025-04-22 RX ORDER — BENZTROPINE MESYLATE 1 MG/ML
2 INJECTION, SOLUTION INTRAMUSCULAR; INTRAVENOUS 2 TIMES DAILY PRN
Status: DISCONTINUED | OUTPATIENT
Start: 2025-04-22 | End: 2025-04-28 | Stop reason: HOSPADM

## 2025-04-22 RX ORDER — LORAZEPAM 2 MG/1
2 TABLET ORAL ONCE
Status: COMPLETED | OUTPATIENT
Start: 2025-04-22 | End: 2025-04-22

## 2025-04-22 RX ORDER — HALOPERIDOL 5 MG/1
5 TABLET ORAL EVERY 6 HOURS PRN
Status: DISCONTINUED | OUTPATIENT
Start: 2025-04-22 | End: 2025-04-28 | Stop reason: HOSPADM

## 2025-04-22 RX ADMIN — MULTIVITAMIN TABLET 1 TABLET: TABLET at 21:46

## 2025-04-22 RX ADMIN — Medication 100 MG: at 21:46

## 2025-04-22 RX ADMIN — HALOPERIDOL 5 MG: 5 TABLET ORAL at 15:17

## 2025-04-22 RX ADMIN — LORAZEPAM 0.5 MG: 0.5 TABLET ORAL at 21:39

## 2025-04-22 RX ADMIN — TRAZODONE HYDROCHLORIDE 50 MG: 50 TABLET ORAL at 21:39

## 2025-04-22 RX ADMIN — FOLIC ACID 1 MG: 1 TABLET ORAL at 21:46

## 2025-04-22 RX ADMIN — LORAZEPAM 2 MG: 2 TABLET ORAL at 15:17

## 2025-04-22 ASSESSMENT — PAIN - FUNCTIONAL ASSESSMENT: PAIN_FUNCTIONAL_ASSESSMENT: NONE - DENIES PAIN

## 2025-04-22 ASSESSMENT — SLEEP AND FATIGUE QUESTIONNAIRES
DO YOU HAVE DIFFICULTY SLEEPING: YES
DO YOU USE A SLEEP AID: NO
SLEEP PATTERN: INSOMNIA

## 2025-04-22 ASSESSMENT — LIFESTYLE VARIABLES
HOW OFTEN DO YOU HAVE A DRINK CONTAINING ALCOHOL: 4 OR MORE TIMES A WEEK
HOW MANY STANDARD DRINKS CONTAINING ALCOHOL DO YOU HAVE ON A TYPICAL DAY: PATIENT UNABLE TO ANSWER

## 2025-04-22 NOTE — ED TRIAGE NOTES
Patient arrived via private car due to having weird thoughts, loss of time, and not taking any meds that he is prescribed or has been prescribed. Patient drinks alcohol, unable to tell me how much. States that some days only 1 drink and other days many. He last had alcohol this morning.   He is with his mother.   He is able to answer important questions, A/ O x 4  Ambulatory by self.   No HI/no SI  Racing thoughts per mom, over the weekend, depressed, emotional, yesterday mellow, and one word answers.

## 2025-04-22 NOTE — ED PROVIDER NOTES
[facility] I  EMERGENCY DEPARTMENT ENCOUNTER      Pt Name: Santiago Thompson  MRN: [id number]  Birthdate 1995  Date of evaluation: 4/22/2025  Provider: Barney Smyth PA-C  1:50 PM EDT    CHIEF COMPLAINT       Chief Complaint   Patient presents with    Psychiatric Evaluation     Psych eval          HISTORY OF PRESENT ILLNESS   (Location/Symptom, Timing/Onset, Context/Setting, Quality, Duration, Modifying Factors, Severity)  Note limiting factors.   Santiago Thompson is a 30 y.o. male who presents to the emergency department with complaint of feeling anxious, overwhelmed, issues with alcohol addiction.  Patient states that he was brought to the emerged from by family for these feelings as above, patient states that he is seeking assistance to figure things out, he denies any suicidal homicidal ideations at this time, but feels he cannot go on living the way he is, due to this is alcohol abuse, as well as progressive worsening anxiety.  Past medical history significant for schizoaffective bipolar disorder alcohol abuse.  Patient states he is not currently actively taking his psychiatric medications at this time, he cannot recall the last time he took them.    HPI    Nursing Notes were reviewed.    REVIEW OF SYSTEMS    (2-9 systems for level 4, 10 or more for level 5)     Review of Systems   Constitutional:  Negative for activity change and appetite change.   HENT:  Negative for congestion, ear discharge, ear pain, nosebleeds, rhinorrhea and sore throat.    Eyes:  Negative for discharge.   Respiratory:  Negative for shortness of breath.    Cardiovascular:  Negative for chest pain, palpitations and leg swelling.   Gastrointestinal:  Negative for abdominal distention, abdominal pain and constipation.   Genitourinary:  Negative for difficulty urinating and dysuria.   Musculoskeletal:  Negative for arthralgias.   Skin:  Negative for color change.   Neurological:  Negative for dizziness, syncope, numbness and

## 2025-04-22 NOTE — VIRTUAL HEALTH
not slept in the past 2 days, reportedly has eaten \"only half a banana\".  He does meet criteria for psychiatric hospitalization, at this time recommend involuntary admission.  He has been treated with emergency medication for treatment of symptoms while awaiting evaluation and ED.    Dx:   Acute psychosis    Plan:  Inpatient psychiatric admission at appropriate care level facility, once medically cleared and stable  Legal Status: INVOLUNTARY.  Patient is in active state of psychosis.   Sitter, suicide and elopement precautions.  Medical co-morbidities: Management per medical providers, appreciate assistance.  Medication Recommendations: Zyprexa 10 mg IM q 6 PRN agitation, max total dosage 20 mg per 24 hours  Reviewed treatment plan with patient including risks, benefits, alternatives, and side effects of medications, and any/all black box warnings. Patient verbalized understanding.  Patient had an opportunity to ask questions and address concerns. Obtained informed consent for treatment.  Medical records, labs, and diagnostic tests reviewed.   Re-consult for any new changes or concerns. Thank you for this consult.  Discussed recommendations with Barney Smyth PA-C via telephone at time of consult completion.    TelePsych recommendations:Inpatient psychiatric admission    Legal hold: Maintain Involuntary Hold    Telepsychiatry will sign off. Thank you for allowing us to participate in the care of this patient. Please send message or call via [employer] if anything more is required.     Electronically signed by JOURDAN Sanchez CNP on 4/22/2025 at 4:34 PM.    END OF NOTE  -------------------------

## 2025-04-22 NOTE — ED NOTES
Patient reviewed with Dr. Payne. Discussed events leading to admit, current assessment, previous history, labs and EKG. Received order to admit to 3w. Call placed to 3W and spoke with Maria Fernanda MCGILL.

## 2025-04-23 LAB
EKG ATRIAL RATE: 56 BPM
EKG P AXIS: 29 DEGREES
EKG P-R INTERVAL: 144 MS
EKG Q-T INTERVAL: 450 MS
EKG QRS DURATION: 98 MS
EKG QTC CALCULATION (BAZETT): 434 MS
EKG R AXIS: 46 DEGREES
EKG T AXIS: 40 DEGREES
EKG VENTRICULAR RATE: 56 BPM

## 2025-04-23 PROCEDURE — 1240000000 HC EMOTIONAL WELLNESS R&B

## 2025-04-23 PROCEDURE — 6370000000 HC RX 637 (ALT 250 FOR IP): Performed by: PSYCHIATRY & NEUROLOGY

## 2025-04-23 PROCEDURE — 99223 1ST HOSP IP/OBS HIGH 75: CPT | Performed by: PSYCHIATRY & NEUROLOGY

## 2025-04-23 RX ORDER — OXCARBAZEPINE 150 MG/1
150 TABLET, FILM COATED ORAL 2 TIMES DAILY
Status: DISCONTINUED | OUTPATIENT
Start: 2025-04-23 | End: 2025-04-28 | Stop reason: HOSPADM

## 2025-04-23 RX ORDER — POLYETHYLENE GLYCOL 3350 17 G
2 POWDER IN PACKET (EA) ORAL
Status: DISCONTINUED | OUTPATIENT
Start: 2025-04-23 | End: 2025-04-28 | Stop reason: HOSPADM

## 2025-04-23 RX ADMIN — NICOTINE POLACRILEX 2 MG: 2 LOZENGE ORAL at 08:50

## 2025-04-23 RX ADMIN — HYDROXYZINE PAMOATE 50 MG: 50 CAPSULE ORAL at 08:50

## 2025-04-23 RX ADMIN — Medication 100 MG: at 08:50

## 2025-04-23 RX ADMIN — OXCARBAZEPINE 150 MG: 150 TABLET, FILM COATED ORAL at 21:07

## 2025-04-23 RX ADMIN — FOLIC ACID 1 MG: 1 TABLET ORAL at 08:50

## 2025-04-23 RX ADMIN — NICOTINE POLACRILEX 2 MG: 2 LOZENGE ORAL at 12:11

## 2025-04-23 RX ADMIN — LORAZEPAM 1 MG: 1 TABLET ORAL at 05:38

## 2025-04-23 RX ADMIN — NICOTINE POLACRILEX 2 MG: 2 LOZENGE ORAL at 06:23

## 2025-04-23 RX ADMIN — NICOTINE POLACRILEX 2 MG: 2 LOZENGE ORAL at 20:13

## 2025-04-23 RX ADMIN — OXCARBAZEPINE 150 MG: 150 TABLET, FILM COATED ORAL at 11:51

## 2025-04-23 RX ADMIN — TRAZODONE HYDROCHLORIDE 50 MG: 50 TABLET ORAL at 21:07

## 2025-04-23 RX ADMIN — NICOTINE POLACRILEX 2 MG: 2 LOZENGE ORAL at 14:37

## 2025-04-23 RX ADMIN — MULTIVITAMIN TABLET 1 TABLET: TABLET at 08:50

## 2025-04-23 RX ADMIN — NICOTINE POLACRILEX 2 MG: 2 LOZENGE ORAL at 17:59

## 2025-04-23 ASSESSMENT — LIFESTYLE VARIABLES
HOW OFTEN DO YOU HAVE A DRINK CONTAINING ALCOHOL: 4 OR MORE TIMES A WEEK
HOW MANY STANDARD DRINKS CONTAINING ALCOHOL DO YOU HAVE ON A TYPICAL DAY: 1 OR 2

## 2025-04-23 ASSESSMENT — SLEEP AND FATIGUE QUESTIONNAIRES
SLEEP PATTERN: INSOMNIA
DO YOU USE A SLEEP AID: NO
DO YOU HAVE DIFFICULTY SLEEPING: YES
AVERAGE NUMBER OF SLEEP HOURS: 3

## 2025-04-23 ASSESSMENT — PATIENT HEALTH QUESTIONNAIRE - PHQ9
2. FEELING DOWN, DEPRESSED OR HOPELESS: SEVERAL DAYS
SUM OF ALL RESPONSES TO PHQ QUESTIONS 1-9: 2
1. LITTLE INTEREST OR PLEASURE IN DOING THINGS: SEVERAL DAYS
SUM OF ALL RESPONSES TO PHQ QUESTIONS 1-9: 2

## 2025-04-23 NOTE — GROUP NOTE
Date: 4/23/2025    Group Start Time: 0910  Group End Time: 0943  Group Topic: Community Meeting    [facility] 3W Claudette Camarillo    \"Drive\" by [employer]ub[employer]  Reny Analysis/Discussion and Receptive Music Listening    Patients will listen to \"Drive\" by [employer]ub[employer] and discuss interpretations/lyrics/themes derived from the song. Patients will explore themes of things in/out of our control, finding purpose/meaning in life, and fear of the future. Patients will be encouraged to share their thoughts with the group.    Focus: Empowerment    Goals: Improve/Maintain Identity Development, Improve/Maintain Insight / Self-Awareness, Improve/Maintain Self-Esteem, Improve/Maintain Self-Expression, and Promote Reality Orientation     Patient listened to recorded music and sang along. Patient shared his musical background with this writer. Patient commented on the lyrics \"if I decide to waiver my chance to be one of the hive,\" expressing that it refers to \"joining the group\" and making a decision to go against what is expected. Patient verbalized support for peers' contributions.      Attended: Full attendance  Participation Level: Active Listener and Interactive  Participation Quality: Attentive and Sharing  Affect/Mood: Congruent/Euthymic  Speech: normal rate and normal volume   Thought Content/Processes: Vague  Level of consciousness: Alert  Response: Able to verbalize current knowledge/experience  Outcomes: Actively participated in the group experience and Initial interaction with patient    Signature: Claudette Hutton, Licensed Professional Music Therapist (LPMT)

## 2025-04-23 NOTE — CONSULTS
Consult Note            Date:4/23/2025        Patient Name:Santiago Thompson     YOB: 1995     Age:30 y.o.    Reason for Consult: Evaluation recommendation for chronic disease management    Chief Complaint     Chief Complaint   Patient presents with    Psychiatric Evaluation     Psych eval           History Obtained From   patient, electronic medical record    History of Present Illness   Patient is a 30-year-old male admitted for behavioral health evaluation.  Per EMR patient presented with complaints of feeling overwhelmed anxious and issues with alcohol addiction.  Patient denies suicidal homicidal ideations.  Patient reported not being able to go on living due to his alcohol abuse.  Patient reported being compliant with his medications but could not recall the last time he took them.  Last drink unknown.  Ethanol level less than 10.  Patient was medically cleared.  Patient denies chest pain, palpitations, lightheadedness, headache, dizziness, shortness of breath, cough, N/V/D.  Patient reports poor appetite.  Refusing to eat.  Will not eat food at facility.  Does not like food offered at this facility.  Patient provided with food choices, encouraged patient to select from menu.  I informed patient will offer nutritional supplements and Gatorade.  Encourage snacks.  Will monitor nutritional intake.  Patient also denies smoking, illicit drug, and alcohol use.  Denies self-inflicted injuries or wounds.  Hospitalist consulted for evaluation and recommendations for acute and chronic disease management while receiving inpatient psych services.  Patient has a past medical history of schizoaffective disorder, bipolar type.        Past Medical History     Past Medical History:   Diagnosis Date    Schizoaffective disorder, bipolar type (HCC)     non compliant.        Past Surgical History   No past surgical history on file.     Medications     Prior to Admission medications    Not on File        nicotine

## 2025-04-23 NOTE — GROUP NOTE
Patient briefly attended group, asked to play guitar, and left after the request was declined.    Date: 4/23/2025    Group Start Time: 1315  Group End Time: 1400  Group Topic: Music Therapy    [facility] Claudette Cam[place]    Live Music Group  Live Music Listening and Re-creative Instrument Playing/Singing    Patients will be given a songbook and offered the opportunity to select (a) song(s) of their choice for live music listening on guitar. Patients will be encouraged to sing along, move along, and listen to the music.    Focus: Building Positive Experiences and Relaxation   Goals: Increase/Maintain Level of Socialization, Improve Mood, Improve/Maintain Use of Coping Skills, and Promote Reality Orientation    Signature: Claudette Hutton, Licensed Professional Music Therapist (LPMT)

## 2025-04-23 NOTE — GROUP NOTE
Group Therapy Note    Date: 4/22/2025    Group Start Time: 1745  Group End Time: 1830  Group Topic: Recreational    [facility] Ron Lopez        Group Therapy Note    Attendees: 8/18     Notes:  Pt did not attend.     Discipline Responsible: Behavorial Health Tech      Signature:  Ron Jarvis

## 2025-04-23 NOTE — H&P
Department of Psychiatry  History and Physical - Adult     CHIEF COMPLAINT:  Psychosis    History obtained from:  patient    Patient was seen after discussing with the treatment team and reviewing the chart    HISTORY OF PRESENT ILLNESS:      JANES report:    Patient is a 30 y.o.  male who presents for alcohol abuse and psychosis. Patient presented to the ED on 04/22/25 from home accompanied by family.  According to ED documentation: Santiago Thompson is a 30 y.o. male who presents to the emergency department with complaint of feeling anxious, overwhelmed, issues with alcohol addiction.  Patient states that he was brought to the emerged from by family for these feelings as above, patient states that he is seeking assistance to figure things out, he denies any suicidal homicidal ideations at this time, but feels he cannot go on living the way he is, due to this is alcohol abuse, as well as progressive worsening anxiety.  Past medical history significant for schizoaffective bipolar disorder alcohol abuse.  Patient states he is not currently actively taking his psychiatric medications at this time, he cannot recall the last time he took them.  Collateral information was obtained from Kev MCGILL who reports that patient has been responding to internal stimuli, engaged in self talk, pacing with pressured speech.  She reports that he presents as paranoid, hiding in corners.  He did receive oral Haldol and Ativan for treatment of symptoms.  His last psychiatric hospitalization was 6/2023 for similar complaints.  At that time upon discharge he was prescribed oxcarbazepine 150 mg twice daily, Risperdal 120 mg subcutaneous every 30 days and trazodone 100 mg nightly.     Santiago is interviewed today virtually, he is agreeable to participating in the evaluation.  He is frequently observed eye darting around the room and at times appears to be talking to someone.  He reports that for the last 2 days he has been having

## 2025-04-23 NOTE — GROUP NOTE
Group Therapy Note    Date: 4/23/2025    Group Start Time: 1000  Group End Time: 1100  Group Topic: Art Therapy     [facility] 3W Sarah Barriga, MARIBELW        Group Therapy Note    Attendees: 5       Patient's Goal:  To participate in morning group art therapy.     Notes:  Patient did not attend.     Modes of Intervention: Activity      Discipline Responsible: Psychoeducational Specialist      Signature:  [facility]irre [facility]

## 2025-04-24 PROBLEM — F31.9 BIPOLAR DEPRESSION (HCC): Status: ACTIVE | Noted: 2020-01-07

## 2025-04-24 PROBLEM — F10.90 ALCOHOL USE DISORDER: Status: ACTIVE | Noted: 2025-04-24

## 2025-04-24 LAB
FOLATE: 7.2 NG/ML (ref 4.8–24.2)
PHOSPHATE SERPL-MCNC: 3.2 MG/DL (ref 2.3–4.8)
VITAMIN B-12: 1090 PG/ML (ref 232–1245)
VITAMIN D 25-HYDROXY: 27 NG/ML (ref 30–100)

## 2025-04-24 PROCEDURE — 1240000000 HC EMOTIONAL WELLNESS R&B

## 2025-04-24 PROCEDURE — 82746 ASSAY OF FOLIC ACID SERUM: CPT

## 2025-04-24 PROCEDURE — 36415 COLL VENOUS BLD VENIPUNCTURE: CPT

## 2025-04-24 PROCEDURE — 82607 VITAMIN B-12: CPT

## 2025-04-24 PROCEDURE — 84100 ASSAY OF PHOSPHORUS: CPT

## 2025-04-24 PROCEDURE — 82306 VITAMIN D 25 HYDROXY: CPT

## 2025-04-24 PROCEDURE — 99232 SBSQ HOSP IP/OBS MODERATE 35: CPT | Performed by: PSYCHIATRY & NEUROLOGY

## 2025-04-24 PROCEDURE — 6370000000 HC RX 637 (ALT 250 FOR IP): Performed by: PSYCHIATRY & NEUROLOGY

## 2025-04-24 RX ADMIN — NICOTINE POLACRILEX 2 MG: 2 LOZENGE ORAL at 16:26

## 2025-04-24 RX ADMIN — NICOTINE POLACRILEX 2 MG: 2 LOZENGE ORAL at 14:01

## 2025-04-24 RX ADMIN — MULTIVITAMIN TABLET 1 TABLET: TABLET at 08:26

## 2025-04-24 RX ADMIN — NICOTINE POLACRILEX 2 MG: 2 LOZENGE ORAL at 03:33

## 2025-04-24 RX ADMIN — NICOTINE POLACRILEX 2 MG: 2 LOZENGE ORAL at 11:59

## 2025-04-24 RX ADMIN — OXCARBAZEPINE 150 MG: 150 TABLET, FILM COATED ORAL at 20:52

## 2025-04-24 RX ADMIN — FOLIC ACID 1 MG: 1 TABLET ORAL at 08:26

## 2025-04-24 RX ADMIN — NICOTINE POLACRILEX 2 MG: 2 LOZENGE ORAL at 20:52

## 2025-04-24 RX ADMIN — NICOTINE POLACRILEX 2 MG: 2 LOZENGE ORAL at 06:54

## 2025-04-24 RX ADMIN — Medication 100 MG: at 08:26

## 2025-04-24 RX ADMIN — OXCARBAZEPINE 150 MG: 150 TABLET, FILM COATED ORAL at 08:26

## 2025-04-24 RX ADMIN — LORAZEPAM 0.5 MG: 0.5 TABLET ORAL at 10:51

## 2025-04-24 RX ADMIN — NICOTINE POLACRILEX 2 MG: 2 LOZENGE ORAL at 10:02

## 2025-04-24 RX ADMIN — NICOTINE POLACRILEX 2 MG: 2 LOZENGE ORAL at 18:35

## 2025-04-24 RX ADMIN — TRAZODONE HYDROCHLORIDE 50 MG: 50 TABLET ORAL at 20:52

## 2025-04-24 NOTE — GROUP NOTE
Date: 4/24/2025    Group Start Time: 0905  Group End Time: 0950  Group Topic: Psychoeducation    [facility] [place] 3W Claudette Camarillo    Coping Skills Identification and Discussion  Education, Exploration, Reny Analysis, Receptive Music Listening, Support    Patients will listen to \"3 Things\" by Amarjit Shanks and discuss lyrics/themes/interpretations derived from the song. Patients will point out coping skills that are present in the song, and identify strategies they have used in the past. Patients will explore several aspects of coping skills (distractions, grounding techniques, practicing self-care, self-expression, social supports) and reflect how they might apply these to their daily lives.     Focus: Mindfulness Techniques, Stress Management  Goals: Improve/Maintain Cognitive Skills, Improve/Maintain Insight / Self-Awareness, Improve/Maintain Self-Expression, Improve/Maintain Use of Coping Skills, and Promote Reality Orientation     Patient listened to recorded music and engaged in peer song discussions. Patient commented on several lyrics, and interpreted the song's message to be \"to keep trying\" and to \"learn from the past.\" Patient expressed that fear of rejection might be a barrier to healthy self-expression. Patient provided several examples of coping skills, including talking to someone, deep breathing, distracting yourself, writing, and connecting with a higher power.    Attended: Full attendance  Participation Level: Active Listener and Interactive  Participation Quality: Attentive, Sharing, and Supportive  Affect/Mood: Congruent/Euthymic  Speech: spontaneous, normal rate, and normal volume   Thought Content/Processes: Linear  Level of consciousness: Alert  Response: Able to verbalize current knowledge/experience  Outcomes: Progressing towards goal and Actively participated in the group experience    Signature: Claudette Hutton, Licensed Professional Music Therapist (LPMT)

## 2025-04-24 NOTE — GROUP NOTE
Group Therapy Note    Date: 4/24/2025    Group Start Time: 1250  Group End Time: 1310  Group Topic:  Group    [facility] Thomas Navarro LSW        Group Therapy Note    Attendees: 5       Patient's Goal:  Positive Affirmations     Notes:  Client engaged in group process of positive affirmations. Client was able to report or identify with at least three (3) positive affirmations from a list of fifty (50). Client was willing to share thoughts with peers and was a positive support to peers in group process. Client reported they would retain the list for future use and reflection.     Status After Intervention:  Improved    Participation Level: Active Listener    Participation Quality: Appropriate      Speech:  normal      Thought Process/Content: Logical      Affective Functioning: Congruent      Mood: euthymic      Level of consciousness:  Alert      Response to Learning: Able to verbalize current knowledge/experience      Endings: None Reported    Modes of Intervention: Education      Discipline Responsible: /Counselor      Signature:  KASSIDY Worthington

## 2025-04-24 NOTE — GROUP NOTE
Date: 4/24/2025    Group Start Time: 1015  Group End Time: 1050  Group Topic: Music Therapy    [facility] [place] 3W Claudette Camarillo    Group Songwriting  Reny Substitution, Music Analysis/Discussion, and Re-creative Instrument Playing/Singing    Patients will listen to \"Hand in My Pocket\" by Paulina Carlson and discuss lyrics, themes, and/or interpretations derived from the song. Patients will identify positive/negative qualities about themselves to contribute to a group reny substitution. Patients will be given the option to revise/edit and perform the new song. Patients will discuss the experience as a group.     Focus: Building Positive Experiences, Challenging Negative Self-Talk  Goals: Improve/Maintain Cognitive Skills, Improve/Maintain Insight / Self-Awareness, Improve Mood, Improve/Maintain Self-Esteem, Improve/Maintain Self-Expression, and Improve/Maintain Use of Coping Skills    Patient listened to live music and peer song discussions. Patient was in/out of group d/t restlessness. Patient contributed the reny \"I'm hurt but I'm okay\" for inclusion in group songwriting. Patient assisted in revising the song. Patient expressed that he found some benefit from the experience.     Attended: 1/2-3/4 attendance  Participation Level: Interactive  Participation Quality: Sharing  Affect/Mood: Constricted/Euthymic  Speech: normal rate and normal volume   Thought Content/Processes: Vague  Level of consciousness: Alert  Response: Able to verbalize current knowledge/experience  Outcomes: Progressing towards goal and Actively participated in the group experience    Signature: Claudette Hutton, Licensed Professional Music Therapist (LPMT)

## 2025-04-25 PROCEDURE — 99232 SBSQ HOSP IP/OBS MODERATE 35: CPT | Performed by: PSYCHIATRY & NEUROLOGY

## 2025-04-25 PROCEDURE — 6370000000 HC RX 637 (ALT 250 FOR IP): Performed by: PSYCHIATRY & NEUROLOGY

## 2025-04-25 PROCEDURE — 1240000000 HC EMOTIONAL WELLNESS R&B

## 2025-04-25 PROCEDURE — 90833 PSYTX W PT W E/M 30 MIN: CPT | Performed by: PSYCHIATRY & NEUROLOGY

## 2025-04-25 RX ORDER — QUETIAPINE FUMARATE 50 MG/1
50 TABLET, FILM COATED ORAL NIGHTLY
Status: DISCONTINUED | OUTPATIENT
Start: 2025-04-25 | End: 2025-04-28 | Stop reason: HOSPADM

## 2025-04-25 RX ADMIN — FOLIC ACID 1 MG: 1 TABLET ORAL at 08:08

## 2025-04-25 RX ADMIN — NICOTINE POLACRILEX 2 MG: 2 LOZENGE ORAL at 14:16

## 2025-04-25 RX ADMIN — MAGNESIUM HYDROXIDE 30 ML: 2400 SUSPENSION ORAL at 09:00

## 2025-04-25 RX ADMIN — NICOTINE POLACRILEX 2 MG: 2 LOZENGE ORAL at 10:09

## 2025-04-25 RX ADMIN — MULTIVITAMIN TABLET 1 TABLET: TABLET at 08:08

## 2025-04-25 RX ADMIN — Medication 100 MG: at 08:08

## 2025-04-25 RX ADMIN — NICOTINE POLACRILEX 2 MG: 2 LOZENGE ORAL at 02:08

## 2025-04-25 RX ADMIN — NICOTINE POLACRILEX 2 MG: 2 LOZENGE ORAL at 06:07

## 2025-04-25 RX ADMIN — NICOTINE POLACRILEX 2 MG: 2 LOZENGE ORAL at 12:18

## 2025-04-25 RX ADMIN — NICOTINE POLACRILEX 2 MG: 2 LOZENGE ORAL at 21:03

## 2025-04-25 RX ADMIN — NICOTINE POLACRILEX 2 MG: 2 LOZENGE ORAL at 18:35

## 2025-04-25 RX ADMIN — OXCARBAZEPINE 150 MG: 150 TABLET, FILM COATED ORAL at 21:03

## 2025-04-25 RX ADMIN — NICOTINE POLACRILEX 2 MG: 2 LOZENGE ORAL at 16:32

## 2025-04-25 RX ADMIN — OXCARBAZEPINE 150 MG: 150 TABLET, FILM COATED ORAL at 08:08

## 2025-04-25 RX ADMIN — NICOTINE POLACRILEX 2 MG: 2 LOZENGE ORAL at 08:08

## 2025-04-25 RX ADMIN — QUETIAPINE FUMARATE 50 MG: 50 TABLET ORAL at 21:03

## 2025-04-25 NOTE — GROUP NOTE
Date: 4/25/2025    Group Start Time: 0915  Group End Time: 1000  Group Topic: Music Therapy    [facility] [place] 3W Claudette Camarillo of Me  Music Analysis/Discussion, Playlist Building/Song Share, and Receptive Music Listening    Patients will create an \"Album of Me\" where they identify songs from childhood, where they are now, a song for the future, how they feel today, how they want to feel and a song they never get sick of hearing. Patients will be invited to select a song to hear and encouraged to explain their connection to it.     Focus: Identity Development, Self-Reflection  Goals: Increase/Maintain Level of Socialization, Improve Mood, Improve/Maintain Self-Esteem, Improve/Maintain Self-Expression, Improve/Maintain Use of Coping Skills, Promote Reality Orientation     Patient independently completed his personal playlist. Patient verbalized support for peers' selections and connected with others through sharing his musical knowledge. Patient selected to hear a song to represent how I want to feel today. Patient reported improved mood following group.     Attended: 1/2-3/4 attendance  Participation Level: Active Listener and Interactive  Participation Quality: Attentive, Sharing, and Supportive  Affect/Mood: Congruent/Euthymic and Brightens  Speech: spontaneous, normal rate, and normal volume   Thought Content/Processes: Linear  Level of consciousness: Alert  Response: Able to verbalize current knowledge/experience  Outcomes: Successfully internalized the purpose of intervention and Actively participated in the group experience    Signature: Claudette Hutton, Licensed Professional Music Therapist (LPMT)

## 2025-04-25 NOTE — GROUP NOTE
Date: 4/25/2025    Group Start Time: 1020  Group End Time: 1055  Group Topic: Music Therapy    [facility] [place] [facility] Claud[place]    Live Music Group  Music Listening and Re-creative Singing    Patients will be given a songbook and offered the opportunity to select (a) song(s) of their choice for live music listening on guitar. Patients will be encouraged to sing along, move along, and listen to the music.    Focus: Building Positive Experiences and Relaxation   Goals: Increase/Maintain Level of Socialization, Improve Mood, Improve/Maintain Self-Esteem, Improve/Maintain Use of Coping Skills, and Promote Reality Orientation    Patient selected songs for live music listening and sang along to familiar music. Patient played guitar and sang part of a song independently, and was receptive to peer/staff support. Patient discussed his fear of playing in front of others, and explained more of his musical experiences to the group. Patient verbalized appreciation for the opportunity to sing/play.     Attended: 3/4-full attendance  Participation Level: Active Listener and Interactive  Participation Quality: Attentive, Sharing, and Supportive  Affect/Mood: Congruent/Euthymic  Speech: spontaneous, normal rate, and normal volume   Thought Content/Processes: Linear  Level of consciousness: Alert  Response: Able to verbalize current knowledge/experience  Outcomes: Progressing towards goal, Successfully internalized the purpose of intervention, and Actively participated in the group experience    Signature: Claudette Hutton, Licensed Professional Music Therapist (LPMT)

## 2025-04-25 NOTE — FLOWSHEET NOTE
Pt is still on CIWA,Pt rates his anxiety and depression 5/10 Pt eats in the dinning room rates his as good and apatite good. Pt attends groups , socializes with select peers. Pt requested Trazodone for sleep. Pt denies SI,HI,AVH. Will continue to monitor.

## 2025-04-25 NOTE — GROUP NOTE
Group Therapy Note    Date: 4/24/2025    Group Start Time: 1400  Group End Time: 1500  Group Topic:  Group    [facility] Thomas Navarro LSW        Group Therapy Note    Attendees: 6       Patient's Goal:  [facility] with Josi Del Cid     Notes:  Client participated in L[facility] group with Josi Del Cid. Client engaged in open discussion about the services available at [facility] .  Discussed the role of a peer supporter and the services available by [facility]. Further the coping skills, positive affirmations, and effective speaking positively in our lives.  Lastly spoke about powerlessness, the importance of telling Doctors and care providers honestly how we feel.       Status After Intervention:  Improved    Participation Level: Active Listener    Participation Quality: Appropriate      Speech:  normal      Thought Process/Content: Logical      Affective Functioning: Congruent      Mood: euthymic      Level of consciousness:  Alert      Response to Learning: Able to verbalize current knowledge/experience      Endings: None Reported    Modes of Intervention: Education      Discipline Responsible: /Counselor      Signature:  KASSIDY Worthington

## 2025-04-26 LAB
ANION GAP SERPL CALCULATED.3IONS-SCNC: 8 MEQ/L (ref 9–15)
BASOPHILS # BLD: 0.1 K/UL (ref 0–0.2)
BASOPHILS NFR BLD: 1 %
BUN SERPL-MCNC: 10 MG/DL (ref 6–20)
CALCIUM SERPL-MCNC: 9.2 MG/DL (ref 8.5–9.9)
CHLORIDE SERPL-SCNC: 99 MEQ/L (ref 95–107)
CO2 SERPL-SCNC: 28 MEQ/L (ref 20–31)
CREAT SERPL-MCNC: 0.82 MG/DL (ref 0.7–1.2)
EOSINOPHIL # BLD: 0.5 K/UL (ref 0–0.7)
EOSINOPHIL NFR BLD: 6.8 %
ERYTHROCYTE [DISTWIDTH] IN BLOOD BY AUTOMATED COUNT: 12.6 % (ref 11.5–14.5)
GLUCOSE SERPL-MCNC: 86 MG/DL (ref 70–99)
HCT VFR BLD AUTO: 45.2 % (ref 42–52)
HGB BLD-MCNC: 14.9 G/DL (ref 14–18)
LYMPHOCYTES # BLD: 1.9 K/UL (ref 1–4.8)
LYMPHOCYTES NFR BLD: 24.3 %
MCH RBC QN AUTO: 31.4 PG (ref 27–31.3)
MCHC RBC AUTO-ENTMCNC: 33 % (ref 33–37)
MCV RBC AUTO: 95.4 FL (ref 79–92.2)
MONOCYTES # BLD: 0.5 K/UL (ref 0.2–0.8)
MONOCYTES NFR BLD: 7 %
NEUTROPHILS # BLD: 4.7 K/UL (ref 1.4–6.5)
NEUTS SEG NFR BLD: 60.5 %
PLATELET # BLD AUTO: 213 K/UL (ref 130–400)
POTASSIUM SERPL-SCNC: 4.4 MEQ/L (ref 3.4–4.9)
RBC # BLD AUTO: 4.74 M/UL (ref 4.7–6.1)
SODIUM SERPL-SCNC: 135 MEQ/L (ref 135–144)
WBC # BLD AUTO: 7.7 K/UL (ref 4.8–10.8)

## 2025-04-26 PROCEDURE — 6370000000 HC RX 637 (ALT 250 FOR IP): Performed by: PSYCHIATRY & NEUROLOGY

## 2025-04-26 PROCEDURE — 1240000000 HC EMOTIONAL WELLNESS R&B

## 2025-04-26 PROCEDURE — 36415 COLL VENOUS BLD VENIPUNCTURE: CPT

## 2025-04-26 PROCEDURE — 85025 COMPLETE CBC W/AUTO DIFF WBC: CPT

## 2025-04-26 PROCEDURE — 80048 BASIC METABOLIC PNL TOTAL CA: CPT

## 2025-04-26 RX ADMIN — NICOTINE POLACRILEX 2 MG: 2 LOZENGE ORAL at 08:18

## 2025-04-26 RX ADMIN — QUETIAPINE FUMARATE 50 MG: 50 TABLET ORAL at 21:06

## 2025-04-26 RX ADMIN — OXCARBAZEPINE 150 MG: 150 TABLET, FILM COATED ORAL at 21:06

## 2025-04-26 RX ADMIN — NICOTINE POLACRILEX 2 MG: 2 LOZENGE ORAL at 01:45

## 2025-04-26 RX ADMIN — NICOTINE POLACRILEX 2 MG: 2 LOZENGE ORAL at 12:20

## 2025-04-26 RX ADMIN — NICOTINE POLACRILEX 2 MG: 2 LOZENGE ORAL at 06:16

## 2025-04-26 RX ADMIN — OXCARBAZEPINE 150 MG: 150 TABLET, FILM COATED ORAL at 08:11

## 2025-04-26 RX ADMIN — NICOTINE POLACRILEX 2 MG: 2 LOZENGE ORAL at 19:05

## 2025-04-26 RX ADMIN — NICOTINE POLACRILEX 2 MG: 2 LOZENGE ORAL at 10:18

## 2025-04-26 RX ADMIN — MULTIVITAMIN TABLET 1 TABLET: TABLET at 08:11

## 2025-04-26 RX ADMIN — NICOTINE POLACRILEX 2 MG: 2 LOZENGE ORAL at 14:19

## 2025-04-26 RX ADMIN — FOLIC ACID 1 MG: 1 TABLET ORAL at 08:11

## 2025-04-26 RX ADMIN — Medication 100 MG: at 08:11

## 2025-04-26 RX ADMIN — NICOTINE POLACRILEX 2 MG: 2 LOZENGE ORAL at 21:06

## 2025-04-26 RX ADMIN — NICOTINE POLACRILEX 2 MG: 2 LOZENGE ORAL at 16:48

## 2025-04-26 NOTE — GROUP NOTE
Group Therapy Note    Date: 4/26/2025    Group Start Time: 1500  Group End Time: 1600  Group Topic: Healthy Living/Wellness    [facility] 3W Margaret Rey RN  [facility]      Group Therapy Note    Attendees: 4/17       Notes:  Introduced patients to [facility] and the services offered. Discussed positive and negative coping, spirituality and freedom to explore [employer] and it's place in the journey of recovery.    Status After Intervention:  Unchanged    Participation Level: Active Listener and Interactive    Participation Quality: Appropriate and Supportive      Speech:  normal      Thought Process/Content: Logical      Affective Functioning: Congruent      Mood: euphoric      Level of consciousness:  Alert      Response to Learning: Progressing to goal      Endings: None Reported    Modes of Intervention: Education, Support, Socialization, Exploration, and Clarifying      Discipline Responsible: /Counselor      Signature:  Margaret Alonso RN

## 2025-04-26 NOTE — GROUP NOTE
Group Therapy Note    Date: 4/26/2025    Group Start Time: 0915  Group End Time: 1000  Group Topic: Psychoeducation    [facility] 3W Susan Bui    Group Therapy Note    End of week check-in     Description:   Patients will be given a paper to complete during this intervention. This template includes questions about patient's weeks: how were you feeling this week, what color would describe your week, etc. Patients will be encouraged to share their findings with peers/staff.     Goals:  Improve/Maintain Cognitive Skills, Improve/Maintain Identity Development, Improve/Maintain Self-Esteem, Improve/Maintain Self-Expression, and Improve/Maintain Use of Coping Skills    Meeting Needs Intervention     Description:   Patients will be handed a sheet that has three categories: my need, how am I meeting this need, & what can I do to better meet this need. Patients will be encouraged to complete the worksheet as is counted as participation in the intervention. Patients will then be encouraged to speak amongst peers/staff about their needs and how they can better meet them. Patients will also be encouraged to share experiences with each other for support.     Goals:   Improve/Maintain Attention to Task, Improve/Maintain Insight / Self-Awareness, Increase/Maintain Level of Socialization, and Improve/Maintain Use of Coping Skills           Pt was present for approximately 1/4 of group session. Pt did not complete the sheets as directed by the leading facilitator, and exited and re-entered the group room multiple times. Pt stood in front of the window for a majority of his attendance.     Signature: Susan Cunningham, Psychoeducational Specialist

## 2025-04-27 PROCEDURE — 6370000000 HC RX 637 (ALT 250 FOR IP): Performed by: PSYCHIATRY & NEUROLOGY

## 2025-04-27 PROCEDURE — 1240000000 HC EMOTIONAL WELLNESS R&B

## 2025-04-27 RX ADMIN — NICOTINE POLACRILEX 2 MG: 2 LOZENGE ORAL at 19:08

## 2025-04-27 RX ADMIN — NICOTINE POLACRILEX 2 MG: 2 LOZENGE ORAL at 10:17

## 2025-04-27 RX ADMIN — OXCARBAZEPINE 150 MG: 150 TABLET, FILM COATED ORAL at 21:03

## 2025-04-27 RX ADMIN — NICOTINE POLACRILEX 2 MG: 2 LOZENGE ORAL at 16:27

## 2025-04-27 RX ADMIN — FOLIC ACID 1 MG: 1 TABLET ORAL at 08:05

## 2025-04-27 RX ADMIN — OXCARBAZEPINE 150 MG: 150 TABLET, FILM COATED ORAL at 08:05

## 2025-04-27 RX ADMIN — Medication 100 MG: at 08:05

## 2025-04-27 RX ADMIN — NICOTINE POLACRILEX 2 MG: 2 LOZENGE ORAL at 12:21

## 2025-04-27 RX ADMIN — NICOTINE POLACRILEX 2 MG: 2 LOZENGE ORAL at 14:19

## 2025-04-27 RX ADMIN — NICOTINE POLACRILEX 2 MG: 2 LOZENGE ORAL at 21:03

## 2025-04-27 RX ADMIN — MULTIVITAMIN TABLET 1 TABLET: TABLET at 08:05

## 2025-04-27 RX ADMIN — NICOTINE POLACRILEX 2 MG: 2 LOZENGE ORAL at 08:05

## 2025-04-27 RX ADMIN — QUETIAPINE FUMARATE 50 MG: 50 TABLET ORAL at 21:03

## 2025-04-27 NOTE — GROUP NOTE
Group Therapy Note    Date: 4/27/2025    Group Start Time: 1010  Group End Time: 1100  Group Topic: Art Therapy     [facility] 3[facility] Sarah Barriga, LISW        Group Therapy Note    Attendees: 4       Patient's Goal:  To participate in morning group art therapy.     Notes:  Patient attended the morning group art therapy session. He demonstrated an interest in completing various origami art tasks. Patient appropriately requested help from the therapist when necessary. He seemed to enjoy the challenge. Patient was social with staff and peers.     Status After Intervention:  Improved    Participation Level: Active Listener    Participation Quality: Appropriate      Speech:  normal      Thought Process/Content: Logical      Affective Functioning: Congruent      Mood: elevated      Level of consciousness:  Alert      Response to Learning: Able to verbalize current knowledge/experience      Endings: None Reported    Modes of Intervention: Activity      Discipline Responsible: Psychoeducational Specialist      Signature:  Sarah Izaguirre MA ATR [facility]

## 2025-04-28 VITALS
OXYGEN SATURATION: 99 % | TEMPERATURE: 97.5 F | RESPIRATION RATE: 16 BRPM | HEART RATE: 75 BPM | SYSTOLIC BLOOD PRESSURE: 133 MMHG | DIASTOLIC BLOOD PRESSURE: 74 MMHG | BODY MASS INDEX: 22.76 KG/M2 | WEIGHT: 150.2 LBS | HEIGHT: 68 IN

## 2025-04-28 PROCEDURE — 6370000000 HC RX 637 (ALT 250 FOR IP): Performed by: PSYCHIATRY & NEUROLOGY

## 2025-04-28 PROCEDURE — 99239 HOSP IP/OBS DSCHRG MGMT >30: CPT | Performed by: PSYCHIATRY & NEUROLOGY

## 2025-04-28 RX ORDER — OXCARBAZEPINE 150 MG/1
150 TABLET, FILM COATED ORAL 2 TIMES DAILY
Qty: 30 TABLET | Refills: 3 | Status: SHIPPED | OUTPATIENT
Start: 2025-04-28 | End: 2025-05-28

## 2025-04-28 RX ORDER — QUETIAPINE FUMARATE 50 MG/1
50 TABLET, FILM COATED ORAL NIGHTLY
Qty: 15 TABLET | Refills: 3 | Status: SHIPPED | OUTPATIENT
Start: 2025-04-28

## 2025-04-28 RX ORDER — MULTIVITAMIN WITH IRON
1 TABLET ORAL DAILY
Qty: 30 TABLET | Refills: 2 | Status: SHIPPED | OUTPATIENT
Start: 2025-04-28

## 2025-04-28 RX ADMIN — FOLIC ACID 1 MG: 1 TABLET ORAL at 09:35

## 2025-04-28 RX ADMIN — OXCARBAZEPINE 150 MG: 150 TABLET, FILM COATED ORAL at 09:34

## 2025-04-28 RX ADMIN — MULTIVITAMIN TABLET 1 TABLET: TABLET at 09:35

## 2025-04-28 RX ADMIN — NICOTINE POLACRILEX 2 MG: 2 LOZENGE ORAL at 09:34

## 2025-04-28 RX ADMIN — Medication 100 MG: at 09:34

## 2025-04-28 RX ADMIN — NICOTINE POLACRILEX 2 MG: 2 LOZENGE ORAL at 05:47

## 2025-04-28 RX ADMIN — NICOTINE POLACRILEX 2 MG: 2 LOZENGE ORAL at 12:03

## 2025-04-28 NOTE — PROGRESS NOTES
[facility]  BEHAVIORAL HEALTH FOLLOW-UP NOTE       4/27/2025     Patient was seen and examined in person, Chart reviewed   Patient's case discussed with staff/team    Chief Complaint: depression mood swings    Interim History:     Patient said he was \"all right\". He said he was \"ready to get going\". He said his sleep was \"a little bit\"- he had some difficulty falling asleep due to the bed\". He said his appetite was \"good\". He said his mood was \"all right\", although he had a little irritation. He denied having thoughts of suicide. He denied having thoughts of harming anyone else. He denied having auditory or visual hallucinations. He denied feeling paranoid (although staff reports he may be paranoid).     Appetite:   [x] Normal/Unchanged  [] Increased  [] Decreased      Sleep:       [] Normal/Unchanged  [x] Fair       [] Poor              Energy:    [x] Normal/Unchanged  [] Increased  [] Decreased        SI [] Present  [x] Absent    HI  []Present  [x] Absent     Aggression:  [] yes  [x] no    Patient is [] able  [] unable to CONTRACT FOR SAFETY     PAST MEDICAL/PSYCHIATRIC HISTORY:   Past Medical History:   Diagnosis Date    Schizoaffective disorder, bipolar type ([facility])     non compliant.       FAMILY/SOCIAL HISTORY:  Family History   Family history unknown: Yes     Social History     Socioeconomic History    Marital status: Single     Spouse name: Not on file    Number of children: Not on file    Years of education: Not on file    Highest education level: Not on file   Occupational History    Not on file   Tobacco Use    Smoking status: Every Day     Current packs/day: 1.00     Types: Cigarettes    Smokeless tobacco: Never   Vaping Use    Vaping status: Former    Substances: Nicotine   Substance and Sexual Activity    Alcohol use: Not Currently    Drug use: Yes     Types: Marijuana (Weed)    Sexual activity: Not on file     Comment: pt will not comment    Other Topics Concern    Not on file 
               [facility]  BEHAVIORAL HEALTH FOLLOW-UP NOTE       4/26/2025     Patient was seen and examined in person, Chart reviewed   Patient's case discussed with staff/team    Chief Complaint: depression mood swings    Interim History:     Patient said he slept 4 hours, woke up, then slept again for 4 hours. He said he was overall \"doing all right\". He said his appetite was \"OK\". He said his mood was \"a little edgy, but all right\". He expressed symptoms of withdrawal, vs. anxiety. He denied having auditory or visual hallucinations. He denied having suicidal or homicidal ideation.     Appetite:   [x] Normal/Unchanged  [] Increased  [] Decreased      Sleep:       [] Normal/Unchanged  [x] Fair       [] Poor              Energy:    [] Normal/Unchanged  [] Increased  [x] Decreased        SI [] Present  [x] Absent    HI  []Present  [x] Absent     Aggression:  [] yes  [x] no    Patient is [] able  [] unable to CONTRACT FOR SAFETY     PAST MEDICAL/PSYCHIATRIC HISTORY:   Past Medical History:   Diagnosis Date    Schizoaffective disorder, bipolar type ([facility])     non compliant.       FAMILY/SOCIAL HISTORY:  Family History   Family history unknown: Yes     Social History     Socioeconomic History    Marital status: Single     Spouse name: Not on file    Number of children: Not on file    Years of education: Not on file    Highest education level: Not on file   Occupational History    Not on file   Tobacco Use    Smoking status: Every Day     Current packs/day: 1.00     Types: Cigarettes    Smokeless tobacco: Never   Vaping Use    Vaping status: Former    Substances: Nicotine   Substance and Sexual Activity    Alcohol use: Not Currently    Drug use: Yes     Types: Marijuana (Weed)    Sexual activity: Not on file     Comment: pt will not comment    Other Topics Concern    Not on file   Social History Narrative    Not on file     Social Drivers of Health     Financial Resource Strain: Not on file   Food Insecurity: 
               [facility]  BEHAVIORAL HEALTH FOLLOW-UP NOTE       4/25/2025     Patient was seen and examined in person, Chart reviewed   Patient's case discussed with staff/team    Chief Complaint: depression mood swings    Interim History:     Pt feeling better, less racing thoughts  Mild tremors ++  Poor sleep last night  Less impulsive and irritability    Appetite:   [] Normal/Unchanged  [] Increased  [x] Decreased      Sleep:       [] Normal/Unchanged  [x] Fair       [] Poor              Energy:    [] Normal/Unchanged  [] Increased  [x] Decreased        SI [] Present  [x] Absent    HI  []Present  [x] Absent     Aggression:  [] yes  [x] no    Patient is [] able  [] unable to CONTRACT FOR SAFETY     PAST MEDICAL/PSYCHIATRIC HISTORY:   Past Medical History:   Diagnosis Date    Schizoaffective disorder, bipolar type ([facility])     non compliant.       FAMILY/SOCIAL HISTORY:  Family History   Family history unknown: Yes     Social History     Socioeconomic History    Marital status: Single     Spouse name: Not on file    Number of children: Not on file    Years of education: Not on file    Highest education level: Not on file   Occupational History    Not on file   Tobacco Use    Smoking status: Every Day     Current packs/day: 1.00     Types: Cigarettes    Smokeless tobacco: Never   Vaping Use    Vaping status: Former    Substances: Nicotine   Substance and Sexual Activity    Alcohol use: Not Currently    Drug use: Yes     Types: Marijuana (Weed)    Sexual activity: Not on file     Comment: pt will not comment    Other Topics Concern    Not on file   Social History Narrative    Not on file     Social Drivers of Health     Financial Resource Strain: Not on file   Food Insecurity: No Food Insecurity (4/23/2025)    Hunger Vital Sign     Worried About Running Out of Food in the Last Year: Never true     Ran Out of Food in the Last Year: Never true   Transportation Needs: No Transportation Needs (4/23/2025)    
      Behavioral Services  Medicare Certification Upon Admission    I certify that this patient's inpatient psychiatric hospital admission is medically necessary for:    [x] (1) Treatment which could reasonably be expected to improve this patient's condition,       [x] (2) Or for diagnostic study;     AND     [x](2) The inpatient psychiatric services are provided while the individual is under the care of a physician and are included in the individualized plan of care.    Estimated length of stay/service 3-5    Plan for post-hospital care Op care    Electronically signed by FELICITAS NAZARIO MD on 4/23/2025 at 10:15 AM      
Approached pt to complete admission process. Pt woke briefly. Pt unwilling to participate with assessment questions. Pt was given Unit paperwork with HIPAA Code and Pt rights. Pt denies ETOH W/D at this time.  
CLINICAL PHARMACY NOTE: MEDS TO BEDS    Total # of Prescriptions Filled: 3   The following medications were delivered to the patient:  Oxcarbazepine 150 mg Tab  MultiVitamin Tab  Quetiapine 50 mg Tab    Additional Documentation:    
Explained and gave newly ordered trileptal, pt was agreeable and most willing to give it a try, Bp obtained, no tremors or sweats noted,.  
Patient to 3W via wheelchair. Oriented to surroundings. Skin check completed. No concerns noted. No contraband found at this time.   
Progress Note    Date:4/24/2025       Room:367/W367-01  Patient Name:Santiago Thompson     YOB: 1995     Age:30 y.o.    Assessment        Hospital Problems           Last Modified POA    * (Principal) Bipolar depression (HCC) 4/24/2025 Yes    Alcohol use disorder 4/24/2025 Yes       Plan:      *Schizoaffective disorder, bipolar type  - Psychiatry     *Elevated blood pressure, tachycardia  -Improved  -EKG sinus bradycardia with sinus arrhythmia on 4/21  -Denies chest pain, palpitations, shortness of breath, headache, or dizziness  - Continue to monitor vital signs  -Treat blood pressure and tachycardia as needed     *Alcohol use  -CIWA precautions with Ativan  Multivitamin and thiamine     *Alkaline phosphatase 117  - Possibly related to dietary habits, medications deficiency hepatic disease  Monitor labs  *Leukocytosis; WBCs 13.4  - Asymptomatic; urine negative for UTI  -Monitor CBC     **Cholesterol 247   - Diet/lifestyle modifications     *Poor appetite  - Monitor intake and output  - Provide food choices  - Adult regular diet with adult nutritional supplements, Gatorade, daily snacks  - Monitor labs  - Consider dietary consult     *Marijuana use  - Advised not to use    Additional work up or/and treatment plan may be added today or then after based on clinical progression by other providers or specialists.  Patient will need to follow-up with PCP for chronic disease management.     I have spent greater than 70% of time  spent focused exclusively on this patient ,reviewing  chart, labs/diagnostics, reconciling medications, &  answering questions with patient and discussing plan.  Subjective   Interval History Status: Patient evaluated for tachycardia, elevated blood pressure , leukocytosis and  alcohol withdrawal symptoms.  Vital signs stable.  Denies signs or symptoms of infection.  However patient does report tremors.  Remains on CIWA precautions with Ativan.  Seizure precautions 
Pt appears calmer, more relaxed, stated he is bored, no tremors or sweats noted.  
Pt medicated per CIWA score of 6 with ativan 0.5mg at this time.   
Pt scored a 6 on CIWA,sarika refused ativan 0.5mg stating,''I walk,that's what I do. You offended me!'' PT irritable,paces unit.  
Pt slept at intervals  
Pt to shower  
Spiritual Support Group Note    Number of Participants in Group: 7                  Time: 5869-8455    Goal: Relief from isolation and loneliness               Katie Sharing              Self-understanding and gain insight                Acceptance and belonging             Recognize they are not alone                  Socialization              Empowerment         Encouragement    Topic:  [x] Spiritual Wellness and Self Care                  [] Hope                     [x] Connecting with Divine/Others        [] Thankfulness and Gratitude               []  Meaningfulness and Purpose               [] Forgiveness               [] Peace               [x] Connect to Community      [] Other    Participation Level:   [x] Active Listener   [] Minimal   [] Monopolizing   [] Interactive   [] No Participation   []  Other:     Attention:   [x] Alert   [] Distractible   [] Drowsy   [] Poor   [] Other:    Manner:   [x] Cooperative   [] Suspicious   [] Withdrawn   [] Guarded   [] Irritable   [] Inhospitable   [] Other:     Others Comments from Group:    
LORazepam (ATIVAN) tablet 3 mg, 3 mg, Oral, Q1H PRN **OR** LORazepam (ATIVAN) injection 4 mg, 4 mg, IntraMUSCular, Q1H PRN, Kwaku Payne MD    ondansetron (ZOFRAN-ODT) disintegrating tablet 4 mg, 4 mg, Oral, Q6H PRN, Kwaku Payne MD    acetaminophen (TYLENOL) tablet 650 mg, 650 mg, Oral, Q4H PRN, Kwaku Payne MD    magnesium hydroxide (MILK OF MAGNESIA) 400 MG/5ML suspension 30 mL, 30 mL, Oral, Daily PRN, Kwaku Payne MD    aluminum & magnesium hydroxide-simethicone (MAALOX PLUS) 200-200-20 MG/5ML suspension 30 mL, 30 mL, Oral, PRN, Kwaku Payne MD    haloperidol (HALDOL) tablet 5 mg, 5 mg, Oral, Q6H PRN **OR** haloperidol lactate (HALDOL) injection 5 mg, 5 mg, IntraMUSCular, Q6H PRN, Kwaku Payne MD    benztropine mesylate (COGENTIN) injection 2 mg, 2 mg, IntraMUSCular, BID PRN, Kwaku Payne MD    traZODone (DESYREL) tablet 50 mg, 50 mg, Oral, Nightly PRN, Kwaku Payne MD, 50 mg at 04/23/25 2107      Examination:  /81   Pulse 76   Temp 97.7 °F (36.5 °C)   Resp 18   Ht 1.727 m (5' 8\")   Wt 68.1 kg (150 lb 3.2 oz)   SpO2 98%   BMI 22.84 kg/m²   Gait - steady\  Medication side effects(SE): no    Mental Status Examination:    Level of consciousness:  within normal limits   Appearance:  fair grooming and fair hygiene  Behavior/Motor:  no abnormalities noted  Attitude toward examiner:  cooperative  Speech:  slow   Mood: dysthymic  Affect:  anxious  Thought processes:  goal directed   Thought content:  Suicidal Ideation:  denies suicidal ideation  Cognition:  oriented to person, place, and time   Concentration poor  Insight fair   Judgement fair     ASSESSMENT:   Patient symptoms are:  [] Well controlled  [] Improving  [] Worsening  [] No change      Diagnosis:   Principal Problem:    Bipolar depression (HCC)  Active Problems:    Alcohol use disorder  Resolved Problems:    * No resolved hospital problems. *      LABS:    Recent Labs     04/22/25  1359   WBC 13.4*   HGB 16.7 
non-tender  MUSCULOSKELETAL:  There is no redness, warmth, or swelling of the joints.  Full range of motion noted  NEUROLOGIC:  Awake, alert.  No focal deficits noted  SKIN:  No bruising or bleeding, normal skin color, texture, turgor, no redness, warmth, or swelling, no rashes, and no lesions    Labs/Imaging/Diagnostics   Labs:  CBC:No results for input(s): \"WBC\", \"RBC\", \"HGB\", \"HCT\", \"MCV\", \"RDW\", \"PLT\" in the last 72 hours.  CHEMISTRIES:  Recent Labs     04/24/25  0705   PHOS 3.2     PT/INR:No results for input(s): \"PROTIME\", \"INR\" in the last 72 hours.  APTT:No results for input(s): \"APTT\" in the last 72 hours.  LIVER PROFILE:No results for input(s): \"AST\", \"ALT\", \"BILIDIR\", \"BILITOT\", \"ALKPHOS\" in the last 72 hours.    Imaging Last 24 Hours:  No results found.    Electronically signed by JOURDAN Martinez CNP on 4/25/25 at 5:59 PM EDT

## 2025-04-28 NOTE — GROUP NOTE
Group Therapy Note    Date: 4/28/2025    Group Start Time: 1010  Group End Time: 1100  Group Topic: Art Therapy     [facility] 3W Sarah Barriga, MARIBELW        Group Therapy Note    Attendees: 8       Patient's Goal:  To participate in morning group art therapy session.     Notes:  Patient attended the morning group art therapy. He demonstrated an interest and appropriately asked for support to completed and origami art task. Patient was a bit frustrated but was able to overcome his frustration. He seemed to enjoy the challenge.     Status After Intervention:  Improved    Participation Level: Active Listener    Participation Quality: Appropriate      Speech:  normal      Thought Process/Content: Logical      Affective Functioning: Congruent      Mood: elevated      Level of consciousness:  Alert      Response to Learning: Able to verbalize current knowledge/experience      Endings: None Reported    Modes of Intervention: Activity      Discipline Responsible: Psychoeducational Specialist      Signature:  Sarah Izaguirre MA ATR LPAT

## 2025-04-28 NOTE — PLAN OF CARE
Problem: Risk for Elopement  Goal: Patient will not exit the unit/facility without proper excort  Outcome: Progressing     Problem: Anxiety  Goal: Will report anxiety at manageable levels  Description: INTERVENTIONS:1. Administer medication as ordered2. Teach and rehearse alternative coping skills3. Provide emotional support with 1:1 interaction with staff  Outcome: Progressing     Problem: Coping  Goal: Pt/Family able to verbalize concerns and demonstrate effective coping strategies  Description: INTERVENTIONS:1. Assist patient/family to identify coping skills, available support systems and cultural and spiritual values2. Provide emotional support, including active listening and acknowledgement of concerns of patient and caregivers3. Reduce environmental stimuli, as able4. Instruct patient/family in relaxation techniques, as appropriate5. Assess for spiritual pain/suffering and initiate Spiritual Care, Psychosocial Clinical Specialist consults as needed  Outcome: Progressing     Problem: Confusion  Goal: Confusion, delirium, dementia, or psychosis is improved or at baseline  Description: INTERVENTIONS:1. Assess for possible contributors to thought disturbance, including medications, impaired vision or hearing, underlying metabolic abnormalities, dehydration, psychiatric diagnoses, and notify attending LIP2. [place] high risk fall precautions, as indicated3. Provide frequent short contacts to provide reality reorientation, refocusing and direction4. Decrease environmental stimuli, including noise as appropriate5. Monitor and intervene to maintain adequate nutrition, hydration, elimination, sleep and activity6. If unable to ensure safety without constant attention obtain sitter and review sitter guidelines with assigned personnel7. Initiate Psychosocial CNS and Spiritual Care consult, as indicated  Outcome: Progressing     Problem: Depression/Self Harm  Goal: Effect of psychiatric condition will be minimized and 
Pt assessed in dayroom. Pt calm and cooperative, with good eye contact. Stated he slept \"as well as I could\" related to the nightlight bothering him, and not having music playing. Also explained that his usual sleep pattern is from 6216-9082. Rated anxiety \"3-5\"/10, denied depression. Stated \"I need fresh air.\" Denied SI/HI/AVH. Pt walks the unit throughout the day.    Problem: Risk for Elopement  Goal: Patient will not exit the unit/facility without proper excort  4/27/2025 0949 by Margaret Alonso RN  Outcome: Progressing  Flowsheets (Taken 4/27/2025 0940)  Nursing Interventions for Elopement Risk: Reduce environmental triggers  4/26/2025 2234 by Rachael Isbell RN  Outcome: Progressing     Problem: Anxiety  Goal: Will report anxiety at manageable levels  Description: INTERVENTIONS:1. Administer medication as ordered2. Teach and rehearse alternative coping skills3. Provide emotional support with 1:1 interaction with staff  4/27/2025 0949 by Margaret Alonso RN  Outcome: Progressing  Flowsheets (Taken 4/27/2025 0940)  Will report anxiety at manageable levels:   Administer medication as ordered   Teach and rehearse alternative coping skills   Provide emotional support with 1:1 interaction with staff  4/26/2025 2234 by Rachael Isbell RN  Outcome: Progressing     Problem: Coping  Goal: Pt/Family able to verbalize concerns and demonstrate effective coping strategies  Description: INTERVENTIONS:1. Assist patient/family to identify coping skills, available support systems and cultural and spiritual values2. Provide emotional support, including active listening and acknowledgement of concerns of patient and caregivers3. Reduce environmental stimuli, as able4. Instruct patient/family in relaxation techniques, as appropriate5. Assess for spiritual pain/suffering and initiate Spiritual Care, Psychosocial Clinical Specialist consults as needed  4/27/2025 0949 by Margaret Alonso RN  Outcome: Progressing  Flowsheets (Taken 4/27/2025 
Pt up ad michael interactive with staff and peers. Elevated mood. Reports mood feels stable. Pt reports mild anxiety and states depression comes and goes. He denies SI/HI/and AVH. No overt attempt to escape. Patient remains free from harm.     Problem: Risk for Elopement  Goal: Patient will not exit the unit/facility without proper excort  4/25/2025 2039 by Cornelio Antonio, RN  Outcome: Progressing     Problem: Anxiety  Goal: Will report anxiety at manageable levels  Description: INTERVENTIONS:1. Administer medication as ordered2. Teach and rehearse alternative coping skills3. Provide emotional support with 1:1 interaction with staff  4/25/2025 2039 by Cornelio Antonio, RN  Outcome: Progressing     Problem: Coping  Goal: Pt/Family able to verbalize concerns and demonstrate effective coping strategies  Description: INTERVENTIONS:1. Assist patient/family to identify coping skills, available support systems and cultural and spiritual values2. Provide emotional support, including active listening and acknowledgement of concerns of patient and caregivers3. Reduce environmental stimuli, as able4. Instruct patient/family in relaxation techniques, as appropriate5. Assess for spiritual pain/suffering and initiate Spiritual Care, Psychosocial Clinical Specialist consults as needed  4/25/2025 2039 by Cornelio Antonio, RN  Outcome: Progressing     Problem: Confusion  Goal: Confusion, delirium, dementia, or psychosis is improved or at baseline  Description: INTERVENTIONS:1. Assess for possible contributors to thought disturbance, including medications, impaired vision or hearing, underlying metabolic abnormalities, dehydration, psychiatric diagnoses, and notify attending LIP2. [place] high risk fall precautions, as indicated3. Provide frequent short contacts to provide reality reorientation, refocusing and direction4. Decrease environmental stimuli, including noise as appropriate5. Monitor and intervene to maintain adequate 
Santiago is out and about on the unit. He is somatic at times with c/o chronic discomfort in back and neck which he states are always present., as well as stomach discomfor which he stes also comes and goes, \" its because of the food I am not used to it\". He denies halluciantions of any kind states that his anxiety level fluctuates but is less than on admission. 'It goes between a  3 and a 5 on a scale of 1-10., with ten being the worst. He denies depression of any kind, \" accept for wishing I was not here, \" I am not really comfortable around people\". He denies SI and HI. He stes he is feeling better overall.  Problem: Risk for Elopement  Goal: Patient will not exit the unit/facility without proper excort  4/26/2025 0945 by Dayana Borrero RN  Outcome: Progressing  4/25/2025 2039 by Cornelio Antonio RN  Outcome: Progressing     Problem: Anxiety  Goal: Will report anxiety at manageable levels  Description: INTERVENTIONS:1. Administer medication as ordered2. Teach and rehearse alternative coping skills3. Provide emotional support with 1:1 interaction with staff  4/26/2025 0945 by Dayana Borrero, RN  Outcome: Progressing  4/25/2025 2039 by Cornelio Antonio RN  Outcome: Progressing     Problem: Coping  Goal: Pt/Family able to verbalize concerns and demonstrate effective coping strategies  Description: INTERVENTIONS:1. Assist patient/family to identify coping skills, available support systems and cultural and spiritual values2. Provide emotional support, including active listening and acknowledgement of concerns of patient and caregivers3. Reduce environmental stimuli, as able4. Instruct patient/family in relaxation techniques, as appropriate5. Assess for spiritual pain/suffering and initiate Spiritual Care, Psychosocial Clinical Specialist consults as needed  4/26/2025 0945 by Dayana Borrero, RN  Outcome: Progressing  4/25/2025 2039 by Cornelio Antonio RN  Outcome: Progressing     Problem: Confusion  Goal: Confusion, 
Visible on the unit, social with staff and peers. Walks with strong and steady gait. Observed doing laps in the hallways. Denies SI, HI, AVH. Appears flat, anxious. Remains cooperative, withdrawn, guarded. Reports that his anxiety is minimal but fluctuates. Denies depression. Reports good sleep and appetite. Voices that he feels he is ready to be discharged. Denies further needs at this time.         Problem: Risk for Elopement  Goal: Patient will not exit the unit/facility without proper excort  4/26/2025 2234 by Rachael Isbell RN  Outcome: Progressing  4/26/2025 0945 by Dayana Borrero RN  Outcome: Progressing     Problem: Anxiety  Goal: Will report anxiety at manageable levels  Description: INTERVENTIONS:1. Administer medication as ordered2. Teach and rehearse alternative coping skills3. Provide emotional support with 1:1 interaction with staff  4/26/2025 2234 by Rachael Isbell RN  Outcome: Progressing  4/26/2025 0945 by Dayana Borrero RN  Outcome: Progressing     Problem: Coping  Goal: Pt/Family able to verbalize concerns and demonstrate effective coping strategies  Description: INTERVENTIONS:1. Assist patient/family to identify coping skills, available support systems and cultural and spiritual values2. Provide emotional support, including active listening and acknowledgement of concerns of patient and caregivers3. Reduce environmental stimuli, as able4. Instruct patient/family in relaxation techniques, as appropriate5. Assess for spiritual pain/suffering and initiate Spiritual Care, Psychosocial Clinical Specialist consults as needed  4/26/2025 2234 by Rachael Isbell RN  Outcome: Progressing  4/26/2025 0945 by Dayana Borrero RN  Outcome: Progressing     Problem: Confusion  Goal: Confusion, delirium, dementia, or psychosis is improved or at baseline  Description: INTERVENTIONS:1. Assess for possible contributors to thought disturbance, including medications, impaired vision or hearing, underlying metabolic 
and initiate Spiritual Care, Psychosocial Clinical Specialist consults as needed  Outcome: Progressing  Flowsheets (Taken 4/23/2025 1447 by Julius Rodriguez, RN)  Patient/family able to verbalize anxieties, fears, and concerns, and demonstrate effective coping: Provide emotional support, including active listening and acknowledgement of concerns of patient and caregivers     Problem: Confusion  Goal: Confusion, delirium, dementia, or psychosis is improved or at baseline  Description: INTERVENTIONS:1. Assess for possible contributors to thought disturbance, including medications, impaired vision or hearing, underlying metabolic abnormalities, dehydration, psychiatric diagnoses, and notify attending LIP2. [place] high risk fall precautions, as indicated3. Provide frequent short contacts to provide reality reorientation, refocusing and direction4. Decrease environmental stimuli, including noise as appropriate5. Monitor and intervene to maintain adequate nutrition, hydration, elimination, sleep and activity6. If unable to ensure safety without constant attention obtain sitter and review sitter guidelines with assigned personnel7. Initiate Psychosocial CNS and Spiritual Care consult, as indicated  Outcome: Progressing  Flowsheets (Taken 4/23/2025 1447 by Julius Rodriguez, RN)  Effect of thought disturbance (confusion, delirium, dementia, or psychosis) are managed with adequate functional status: Decrease environmental stimuli, including noise as appropriate     Problem: Depression/Self Harm  Goal: Effect of psychiatric condition will be minimized and patient will be protected from self harm  Description: INTERVENTIONS:1. Assess impact of patient's symptoms on level of functioning, self care needs and offer support as indicated2. Assess patient/family knowledge of depression, impact on illness and need for teaching3. Provide emotional support, presence and reassurance4. Assess for possible suicidal thoughts or 
indicated  4/28/2025 0032 by Rachael Isbell RN  Outcome: Progressing     Problem: Depression/Self Harm  Goal: Effect of psychiatric condition will be minimized and patient will be protected from self harm  Description: INTERVENTIONS:1. Assess impact of patient's symptoms on level of functioning, self care needs and offer support as indicated2. Assess patient/family knowledge of depression, impact on illness and need for teaching3. Provide emotional support, presence and reassurance4. Assess for possible suicidal thoughts or ideation. If patient expresses suicidal thoughts or statements do not leave alone, initiate Suicide Precautions, move to a room close to the nursing station and obtain sitter5. Initiate consults as appropriate with Mental Health Professional, Spiritual Care, Psychosocial CNS, and consider a recommendation to the LIP for a Psychiatric Consultation  4/28/2025 0032 by Rachael Isbell RN  Outcome: Progressing     Problem: Drug Abuse/Detox  Goal: Will have no detox symptoms and will verbalize plan for changing drug-related behavior  Description: INTERVENTIONS:1. Administer medication as ordered2. Monitor physical status3. Provide emotional support with 1:1 interaction with staff4. Encourage  recovery focused treatment   4/28/2025 0032 by Rachael Isbell, RN  Outcome: Progressing     Problem: Safety - Adult  Goal: Free from fall injury  4/28/2025 0032 by Rachael Isbell RN  Outcome: Progressing     Problem: Discharge Planning  Goal: Discharge to home or other facility with appropriate resources  4/28/2025 0032 by Rachael Isbell, RN  Outcome: Progressing     
  Problem: Safety - Adult  Goal: Free from fall injury  Outcome: Progressing     
CNS and Spiritual Care consult, as indicated  4/23/2025 1943 by Cortney Hartman RN  Outcome: Progressing  Flowsheets (Taken 4/23/2025 1447 by Julius Rodriguez RN)  Effect of thought disturbance (confusion, delirium, dementia, or psychosis) are managed with adequate functional status: Decrease environmental stimuli, including noise as appropriate  4/23/2025 0731 by Julius Rodriguez RN  Outcome: Progressing     Problem: Depression/Self Harm  Goal: Effect of psychiatric condition will be minimized and patient will be protected from self harm  Description: INTERVENTIONS:1. Assess impact of patient's symptoms on level of functioning, self care needs and offer support as indicated2. Assess patient/family knowledge of depression, impact on illness and need for teaching3. Provide emotional support, presence and reassurance4. Assess for possible suicidal thoughts or ideation. If patient expresses suicidal thoughts or statements do not leave alone, initiate Suicide Precautions, move to a room close to the nursing station and obtain sitter5. Initiate consults as appropriate with Mental Health Professional, Spiritual Care, Psychosocial CNS, and consider a recommendation to the LIP for a Psychiatric Consultation  4/23/2025 1943 by Cortney Hartman RN  Outcome: Progressing  Flowsheets  Taken 4/23/2025 1447 by Julius Rodriguez RN  Effect of psychiatric condition will be minimized and patient will be protected from self harm: Assess impact of patient’s symptoms on level of functioning, self care needs and offer support as indicated  Taken 4/23/2025 1435 by Julius Rodriguez RN  Effect of psychiatric condition will be minimized and patient will be protected from self harm:   Assess impact of patient’s symptoms on level of functioning, self care needs and offer support as indicated   Assess patient/family knowledge of depression, impact on illness and need for teaching   Provide emotional support, presence and 
suicidal thoughts or ideation. If patient expresses suicidal thoughts or statements do not leave alone, initiate Suicide Precautions, move to a room close to the nursing station and obtain sitter5. Initiate consults as appropriate with Mental Health Professional, Spiritual Care, Psychosocial CNS, and consider a recommendation to the LIP for a Psychiatric Consultation  4/25/2025 1125 by Cortney Hartman RN  Outcome: Progressing  4/24/2025 2309 by Cornelio Antonio RN  Outcome: Progressing  4/24/2025 2251 by Cornelio Antonio RN  Outcome: Progressing     Problem: Drug Abuse/Detox  Goal: Will have no detox symptoms and will verbalize plan for changing drug-related behavior  Description: INTERVENTIONS:1. Administer medication as ordered2. Monitor physical status3. Provide emotional support with 1:1 interaction with staff4. Encourage  recovery focused treatment   4/25/2025 1125 by Cortney Hartman RN  Outcome: Progressing  4/24/2025 2309 by Cornelio Antonio RN  Outcome: Progressing  4/24/2025 2251 by Cornelio Antonio RN  Outcome: Progressing     Problem: Safety - Adult  Goal: Free from fall injury  4/25/2025 1125 by Cortney Hartman RN  Outcome: Progressing  4/24/2025 2309 by Cornelio Antonio RN  Outcome: Progressing  4/24/2025 2251 by Cornelio Antonio RN  Outcome: Progressing

## 2025-04-28 NOTE — FLOWSHEET NOTE
Reviewed discharge instructions with patient. Pt. Verbalized understanding. Denies SI/HI/AVH. Ambulatory off unit with belongings and meds to beds.

## 2025-04-28 NOTE — DISCHARGE INSTR - DIET

## 2025-04-28 NOTE — FLOWSHEET NOTE
,Pt rates his anxiety and depression 0/10 Pt eats in the dinning room rates his as good and apatite good. Pt attends groups , socializes with select peers. Pt requested Trazodone for sleep Pt is cooperative and friendly. Pt denies SI,HI,AVH. Will continue to monitor.

## 2025-04-28 NOTE — DISCHARGE SUMMARY
nearest ED or call 911 if symptoms are not manageable.     Patient's family member was contacted prior to the discharge.         Medication List        START taking these medications      multivitamin Tabs tablet  Take 1 tablet by mouth daily     QUEtiapine 50 MG tablet  Commonly known as: SEROQUEL  Take 1 tablet by mouth nightly            CONTINUE taking these medications      OXcarbazepine 150 MG tablet  Commonly known as: TRILEPTAL  Take 1 tablet by mouth 2 times daily               Where to Get Your Medications        These medications were sent to [facility] - [place], [place] - [address] - P [phone] - F [phone]  [address] [place] [address] [place] [address]      Phone: [phone]   multivitamin Tabs tablet  OXcarbazepine 150 MG tablet  QUEtiapine 50 MG tablet           Reason for more than one antipsychotic:   [x] N/A  [] 3 failed monotherapy(drugs tried):  [] Cross over to a new antipsychotic  [] Taper to monotherapy from polypharmacy  [] Augmentation of Clozapine therapy due to treatment resistance to single therapy        TIME SPEND - 35 MINUTES TO COMPLETE THE EVALUATION, DISCHARGE SUMMARY, MEDICATION RECONCILIATION AND FOLLOW UP CARE     Signed:  FELICITAS NAZARIO MD  4/28/2025  4:30 PM

## 2025-04-28 NOTE — DISCHARGE INSTRUCTIONS
Someone from [facility] will be calling you tomorrow to follow up on your care. If you don't hear from us, give us a call! [phone].    Keep all follow up appointments, take medications as ordered, utilize positive supports, abstain from use of alcohol and drugs. If symptoms return or you feel at risk to yourself or others, please call 911, return the nearest emergency room, or call your local crisis hotline:  [facility]: [phone]  [facility]: [phone]  [facility]: [phone]     For assistance with quitting smoking please go to https://smokefree.gov. A prescription for an FDA-approved tobacco cessation medication was offered at discharge and the patient refused.

## 2025-04-28 NOTE — GROUP NOTE
Date: 4/28/2025    Group Start Time: 0925  Group End Time: 0952  Group Topic: Music Therapy    [facility] [place] [facility] Claudette Camarillo    Identity Development  Reny Analysis/Discussion, Receptive Music Listening    \"Strength, Courage, and [place]\" by Sherrell Fofana and identify themes/lyrics/interpretations derived from each song. Patients will discuss personal strengths and challenges they've overcome.     Goals: Improve/Maintain Cognitive Skills, Improve/Maintain Insight / Self-Awareness, Improve/Maintain Self-Esteem, Improve/Maintain Self-Expression, and Improve/Maintain Use of Coping Skills     Patient joined for the last few minutes, and shared that he plans to maintain his sobriety from alcohol following D/C today as a short-term goal.    Signature: Claudette Hutton, Licensed Professional Music Therapist (LPMT)